# Patient Record
Sex: FEMALE | Race: WHITE | HISPANIC OR LATINO
[De-identification: names, ages, dates, MRNs, and addresses within clinical notes are randomized per-mention and may not be internally consistent; named-entity substitution may affect disease eponyms.]

---

## 2017-01-23 ENCOUNTER — RX RENEWAL (OUTPATIENT)
Age: 62
End: 2017-01-23

## 2017-07-11 ENCOUNTER — APPOINTMENT (OUTPATIENT)
Dept: ORTHOPEDIC SURGERY | Facility: CLINIC | Age: 62
End: 2017-07-11

## 2017-07-21 ENCOUNTER — RX RENEWAL (OUTPATIENT)
Age: 62
End: 2017-07-21

## 2017-12-19 ENCOUNTER — APPOINTMENT (OUTPATIENT)
Dept: ORTHOPEDIC SURGERY | Facility: CLINIC | Age: 62
End: 2017-12-19
Payer: COMMERCIAL

## 2017-12-19 DIAGNOSIS — M25.552 PAIN IN LEFT HIP: ICD-10-CM

## 2017-12-19 DIAGNOSIS — M70.50 OTHER BURSITIS OF KNEE, UNSPECIFIED KNEE: ICD-10-CM

## 2017-12-19 PROCEDURE — 72170 X-RAY EXAM OF PELVIS: CPT

## 2017-12-19 PROCEDURE — 20610 DRAIN/INJ JOINT/BURSA W/O US: CPT | Mod: LT

## 2017-12-19 PROCEDURE — 99213 OFFICE O/P EST LOW 20 MIN: CPT | Mod: 25

## 2017-12-19 PROCEDURE — 73564 X-RAY EXAM KNEE 4 OR MORE: CPT | Mod: RT

## 2018-01-17 ENCOUNTER — MEDICATION RENEWAL (OUTPATIENT)
Age: 63
End: 2018-01-17

## 2018-05-14 ENCOUNTER — RX RENEWAL (OUTPATIENT)
Age: 63
End: 2018-05-14

## 2018-06-27 ENCOUNTER — APPOINTMENT (OUTPATIENT)
Dept: ORTHOPEDIC SURGERY | Facility: CLINIC | Age: 63
End: 2018-06-27
Payer: MEDICARE

## 2018-06-27 PROCEDURE — 73522 X-RAY EXAM HIPS BI 3-4 VIEWS: CPT

## 2018-06-27 PROCEDURE — 99214 OFFICE O/P EST MOD 30 MIN: CPT

## 2018-06-27 PROCEDURE — 73562 X-RAY EXAM OF KNEE 3: CPT | Mod: 50

## 2018-08-06 ENCOUNTER — APPOINTMENT (OUTPATIENT)
Dept: ORTHOPEDIC SURGERY | Facility: CLINIC | Age: 63
End: 2018-08-06
Payer: MEDICARE

## 2018-08-06 VITALS
HEART RATE: 75 BPM | DIASTOLIC BLOOD PRESSURE: 70 MMHG | HEIGHT: 61 IN | BODY MASS INDEX: 37.38 KG/M2 | SYSTOLIC BLOOD PRESSURE: 128 MMHG | WEIGHT: 198 LBS

## 2018-08-06 DIAGNOSIS — M75.32 CALCIFIC TENDINITIS OF LEFT SHOULDER: ICD-10-CM

## 2018-08-06 PROCEDURE — 20610 DRAIN/INJ JOINT/BURSA W/O US: CPT | Mod: RT

## 2018-08-06 PROCEDURE — 99213 OFFICE O/P EST LOW 20 MIN: CPT | Mod: 25

## 2018-08-06 PROCEDURE — 73030 X-RAY EXAM OF SHOULDER: CPT | Mod: 50

## 2018-08-22 ENCOUNTER — RX RENEWAL (OUTPATIENT)
Age: 63
End: 2018-08-22

## 2018-11-19 ENCOUNTER — APPOINTMENT (OUTPATIENT)
Dept: ORTHOPEDIC SURGERY | Facility: CLINIC | Age: 63
End: 2018-11-19
Payer: MEDICARE

## 2018-11-19 VITALS
BODY MASS INDEX: 35.87 KG/M2 | HEART RATE: 89 BPM | HEIGHT: 61 IN | WEIGHT: 190 LBS | SYSTOLIC BLOOD PRESSURE: 149 MMHG | DIASTOLIC BLOOD PRESSURE: 74 MMHG

## 2018-11-19 PROCEDURE — 99214 OFFICE O/P EST MOD 30 MIN: CPT | Mod: 25

## 2018-11-19 PROCEDURE — 20610 DRAIN/INJ JOINT/BURSA W/O US: CPT | Mod: RT

## 2019-01-02 ENCOUNTER — RX RENEWAL (OUTPATIENT)
Age: 64
End: 2019-01-02

## 2019-03-05 ENCOUNTER — APPOINTMENT (OUTPATIENT)
Dept: ORTHOPEDIC SURGERY | Facility: CLINIC | Age: 64
End: 2019-03-05
Payer: MEDICARE

## 2019-03-05 DIAGNOSIS — Z96.651 PRESENCE OF RIGHT ARTIFICIAL KNEE JOINT: ICD-10-CM

## 2019-03-05 DIAGNOSIS — M17.12 UNILATERAL PRIMARY OSTEOARTHRITIS, LEFT KNEE: ICD-10-CM

## 2019-03-05 PROCEDURE — 20610 DRAIN/INJ JOINT/BURSA W/O US: CPT | Mod: LT

## 2019-03-05 PROCEDURE — 99213 OFFICE O/P EST LOW 20 MIN: CPT | Mod: 25

## 2019-03-05 NOTE — DISCUSSION/SUMMARY
[de-identified] : She tolerated a local injection very well she will return in 3 months that she is having problem with her knee or 1 year for her total joint replacement.

## 2019-03-05 NOTE — PROCEDURE
[de-identified] : Procedure Note:\par \par Anatomic Location:  Left Knee\par \par Diagnosis:  Arthritis\par \par Procedure:  Injection of 2cc  of Marcaine 0.25% plain and Celestone 1cc, 6mg\par \par Local Spray: Ethyl Chloride.\par \par \par Patient has consented for the procedure.\par \par Injection  through a lateral parapatella approach.\par \par Patient tolerated the procedure well.\par \par Patient instructed to call the office if any reaction, fever, chills, increased erythema or swelling.   [phone].

## 2019-03-05 NOTE — PHYSICAL EXAM
[FreeTextEntry2] : She is doing very well with her hips.  She has really had no change of motion or function both hips are feeling well at this time.\par \par At this time her total knee replacement on the right is also doing well with full extension to 120 to.  Her left knee has arthritis and goes from full extension to 120 degrees of flexion today good medial lateral and anterior posterior stability but she is getting discomfort from her arthritis and gets significant improvement with local injections of Celestone.\par

## 2019-03-05 NOTE — HISTORY OF PRESENT ILLNESS
[All Hx] : past medical, family, and social [All] : medication and allergy [All Other ROS Normal] : All other review of systems are negative except as noted [FreeTextEntry1] : left knee pain [FreeTextEntry2] : Pt is a 62 y/o female  s/p R TKR 6/20/16, R THR 19 years ago, L THR 12 years ago and L TKR 13 years ago which are doing well.  She c/o left knee pain.  She states that she fell on the left knee twice in the last 6 weeks.  She has pain whenever she flexes the knee.  She does not have pain when she walks.  The pain is improving but her physical therapist advised that she be seen.  She takes Mobic and Tylenol for pain.

## 2019-03-20 ENCOUNTER — RX RENEWAL (OUTPATIENT)
Age: 64
End: 2019-03-20

## 2019-04-29 ENCOUNTER — RX RENEWAL (OUTPATIENT)
Age: 64
End: 2019-04-29

## 2019-04-30 ENCOUNTER — RX RENEWAL (OUTPATIENT)
Age: 64
End: 2019-04-30

## 2019-06-03 ENCOUNTER — RX RENEWAL (OUTPATIENT)
Age: 64
End: 2019-06-03

## 2019-06-10 ENCOUNTER — RX RENEWAL (OUTPATIENT)
Age: 64
End: 2019-06-10

## 2020-03-16 ENCOUNTER — RX RENEWAL (OUTPATIENT)
Age: 65
End: 2020-03-16

## 2020-08-19 ENCOUNTER — APPOINTMENT (OUTPATIENT)
Dept: ORTHOPEDIC SURGERY | Facility: CLINIC | Age: 65
End: 2020-08-19
Payer: MEDICARE

## 2020-08-19 VITALS — TEMPERATURE: 97.3 F

## 2020-08-19 PROCEDURE — 73562 X-RAY EXAM OF KNEE 3: CPT | Mod: 50

## 2020-08-19 PROCEDURE — 99213 OFFICE O/P EST LOW 20 MIN: CPT

## 2020-08-19 PROCEDURE — 73522 X-RAY EXAM HIPS BI 3-4 VIEWS: CPT

## 2020-08-19 RX ORDER — MELOXICAM 15 MG/1
15 TABLET ORAL
Qty: 90 | Refills: 1 | Status: ACTIVE | COMMUNITY
Start: 2020-08-19 | End: 1900-01-01

## 2020-09-04 ENCOUNTER — TRANSCRIPTION ENCOUNTER (OUTPATIENT)
Age: 65
End: 2020-09-04

## 2020-09-16 ENCOUNTER — TRANSCRIPTION ENCOUNTER (OUTPATIENT)
Age: 65
End: 2020-09-16

## 2020-12-01 ENCOUNTER — APPOINTMENT (OUTPATIENT)
Dept: ORTHOPEDIC SURGERY | Facility: CLINIC | Age: 65
End: 2020-12-01
Payer: MEDICARE

## 2020-12-01 VITALS — TEMPERATURE: 96.8 F

## 2020-12-01 DIAGNOSIS — M75.41 IMPINGEMENT SYNDROME OF RIGHT SHOULDER: ICD-10-CM

## 2020-12-01 PROCEDURE — 99214 OFFICE O/P EST MOD 30 MIN: CPT | Mod: 25

## 2020-12-01 PROCEDURE — 20610 DRAIN/INJ JOINT/BURSA W/O US: CPT | Mod: RT

## 2020-12-02 ENCOUNTER — APPOINTMENT (OUTPATIENT)
Dept: ORTHOPEDIC SURGERY | Facility: CLINIC | Age: 65
End: 2020-12-02
Payer: MEDICARE

## 2020-12-02 ENCOUNTER — APPOINTMENT (OUTPATIENT)
Dept: ORTHOPEDIC SURGERY | Facility: CLINIC | Age: 65
End: 2020-12-02

## 2020-12-02 VITALS
DIASTOLIC BLOOD PRESSURE: 70 MMHG | HEIGHT: 61 IN | HEART RATE: 82 BPM | BODY MASS INDEX: 41.54 KG/M2 | WEIGHT: 220 LBS | SYSTOLIC BLOOD PRESSURE: 158 MMHG

## 2020-12-02 VITALS — TEMPERATURE: 96.8 F

## 2020-12-02 DIAGNOSIS — M70.72 OTHER BURSITIS OF HIP, LEFT HIP: ICD-10-CM

## 2020-12-02 PROCEDURE — 20610 DRAIN/INJ JOINT/BURSA W/O US: CPT | Mod: LT

## 2020-12-02 PROCEDURE — 99213 OFFICE O/P EST LOW 20 MIN: CPT | Mod: 95

## 2020-12-02 NOTE — PROCEDURE
[de-identified] : This injection was given by CARL Gant\par \par Procedure Note: \par \par Anatomic Location: left  hip trochanteric bursitis\par \par Diagnosis:  hip trochanteric bursitis\par \par Procedure:  Injection of 6ccs of Marcaine 0.5% plain and Depo-Medrol 1cc (40 MG)\par \par Local Spray: Ethyl Chloride.\par \par Skin preparation with alcohol.\par \par Patient has consented for the procedure.\par \par Injection 22-gauge spinal needle  through a direct lateral approach.\par \par Patient tolerated the procedure well.\par \par Patient instructed to call the office if any reaction, fever, chills, increased erythema or swelling.   [phone].

## 2020-12-02 NOTE — HISTORY OF PRESENT ILLNESS
[Other Location: e.g. School (Enter Location, City,State)___] : at [unfilled], at the time of the visit. [Other Location: e.g. Home (Enter Location, City,State)___] : at [unfilled] [Verbal consent obtained from patient] : the patient, [unfilled] [de-identified] : Pt is a 64 y/o female s/p R TKR 4 years ago, R THR 20 years ago, L THR 13 years ago and L TKR 14 years ago which are doing well. \par She presents with c/o left hip pain x 2 weeks.\par Her left hip pain is on the lateral aspect and hurts with activities such as getting up from a seated position and with prolonged ambulating.\par It also hurts when sleeping on the affected side at night.\par She reports some tightness in the left thigh. She takes Mobic and Tylenol for pain as needed with relief. No numbness or tingling.

## 2020-12-02 NOTE — DISCUSSION/SUMMARY
[de-identified] : She tolerated the local injection well and her left greater trochanteric bursa.  Return visit in 6 months to a year or sooner if necessary.

## 2020-12-02 NOTE — PROCEDURE
[de-identified] : Procedure Note: \par \par Anatomic Location: left hip trochanteric bursitis\par \par Diagnosis: hip trochanteric bursitis\par \par Procedure: Injection of 6ccs of Marcaine 0.5% plain and Depo-Medrol 1cc (40 MG)\par \par Local Spray: Ethyl Chloride.\par \par Skin preparation with alcohol.\par \par Patient has consented for the procedure.\par \par Injection 22-gauge spinal needle through a direct lateral approach.\par \par Patient tolerated the procedure well.\par \par Patient instructed to call the office if any reaction, fever, chills, increased erythema or swelling. [phone].

## 2020-12-02 NOTE — PHYSICAL EXAM
[FreeTextEntry2] : Her hips continue to do well she has not had any major change of motion she has somewhat less motion as before in her right hip than her left but her pain is over her left hip is not the groin and is not buttock but her pain is directly over her left greater trochanter is consistent with trochanteric bursitis.\par \par Her right total knee replacement is not changed and is doing well.  She does have left knee arthritis but at this time is managing satisfactorily with it.  \par \par

## 2020-12-03 NOTE — PROCEDURE
[de-identified] : Injection: Right shoulder (Subacromial).\par Indication: Rotator cuff tear.\par \par A discussion was had with the patient regarding this procedure and all questions were answered. All risks, benefits and alternatives were discussed. These included but were not limited to bleeding, infection, and allergic reaction. Alcohol was used to clean the skin, and betadine was used to sterilize and prep the area in the posterior aspect of the right shoulder. Ethyl chloride spray was then used as a topical anesthetic. A 21-gauge needle was used to inject 4cc of 1% lidocaine and 1cc of 40mg/ml methylprednisolone into the right subacromial space. A sterile bandage was then applied. The patient tolerated the procedure well and there were no complications.

## 2020-12-03 NOTE — DISCUSSION/SUMMARY
[de-identified] : 64 y/o female with right shoulder rotator cuff tear\par \par Patient continues to have symptoms of the right shoulder that are exacerbated with overuse.  Ultrasound has shown a large underlying rotator cuff injury with likely long head biceps tendon rupture.  Unable to undergo any surgical intervention at this time given her need to look after her aging mother.  Discussed utility of cortisone in attempts to control acute discomfort, but long-term consequences to tendon function and possible decreased outcomes with future rotator cuff repair.\par \par Recommendations: Continue conservative management.  Activity modifications.  Symptomatic pain relief\par \par Follow-up as needed.

## 2020-12-03 NOTE — REASON FOR VISIT
[Follow-Up Visit] : a follow-up visit for [Shoulder Pain] : shoulder pain [FreeTextEntry2] : Right shoulder pain

## 2020-12-03 NOTE — HISTORY OF PRESENT ILLNESS
[de-identified] : 65 year old female seen previously for chronic bilateral shoulder pain presents for follow up of right shoulder. States that her pain has gotten worse in the past month. US right shoulder dated 12/5/18 shows evidence of large RTC tear supraspinatus, tendinopathy and PT tear IS. Likely LHB tendon rupture. She received cortisone injection 2018 which providing her significant relief until recently. Currently attending PT 2 x's a week which is helpful. She is here for repeat injection as she is unable to undergo any surgical intervention as she continues to look after her aging mother. Rates her pain 7/10 and takes Motrin/ Tylenol.

## 2020-12-03 NOTE — ADDENDUM
[FreeTextEntry1] : This note was written by Corrina John on 12/01/2020 acting solely as a scribe for Dr. Shan Topete.\par \par All medical record entries made by the Scribe were at my, Dr. Shan Topete, direction and personally dictated by me on 12/01/2020. I have personally reviewed the chart and agree that the record accurately reflects my personal performance of the history, physical exam, assessment and plan.

## 2020-12-03 NOTE — PHYSICAL EXAM
[de-identified] : Oriented to time, place, person\par Mood: Normal\par Affect: Normal\par Appearance: Healthy, well appearing, no acute distress.\par Gait: Normal\par Assistive Devices: None \par \par Right shoulder exam:\par \par Inspection: No malalignment, No defects, No atrophy\par Skin: No masses, No lesions\par Neck: Negative Spurling, full ROM, no pain with ROM\par AROM: FF to 180, abduction to 90, ER to 70, IR to mid lumbar\par Painful arc ROM: none\par Tenderness: No bicipital tenderness, no tenderness to greater tuberosity/RTC insertion, no anterior shoulder/lesser tuberosity tenderness\par Strength: 4/5 ER, 5/5 IR in adduction, 4+/5 supraspinatus testing, negative [place]'s test\par AC joint: No TTP/pain with cross arm testing\par Biceps: Speed Negative, Yergason Negative \par Impingement test: Negative [place], Negative Neer\par Vasc: 2+ radial pulse \par Stability: Stable \par Neuro: AIN, PIN, Ulnar nerve intact to motor\par Sensation: Intact to light touch throughout  [de-identified] : 3 views of the right shoulder were obtained 8.6.18 that show no acute fracture or dislocation. There is mild glenohumeral and mild AC joint degenerative change seen. Type II acromion. There is no significant malalignment. No significant other obvious osseous abnormality, otherwise unremarkable.\par \par US right shoulder dated 12/5/18 shows evidence of large RTC tear supraspinatus, tendinopathy and PT tear IS. Likely LHB tendon rupture.

## 2021-01-06 ENCOUNTER — APPOINTMENT (OUTPATIENT)
Dept: ORTHOPEDIC SURGERY | Facility: CLINIC | Age: 66
End: 2021-01-06
Payer: MEDICARE

## 2021-01-06 VITALS
WEIGHT: 220 LBS | SYSTOLIC BLOOD PRESSURE: 133 MMHG | HEIGHT: 61 IN | DIASTOLIC BLOOD PRESSURE: 70 MMHG | HEART RATE: 85 BPM | BODY MASS INDEX: 41.54 KG/M2

## 2021-01-06 VITALS — TEMPERATURE: 97.2 F

## 2021-01-06 DIAGNOSIS — M70.62 TROCHANTERIC BURSITIS, LEFT HIP: ICD-10-CM

## 2021-01-06 DIAGNOSIS — Z96.642 PRESENCE OF LEFT ARTIFICIAL HIP JOINT: ICD-10-CM

## 2021-01-06 PROCEDURE — 99213 OFFICE O/P EST LOW 20 MIN: CPT | Mod: 95

## 2021-01-06 PROCEDURE — 73522 X-RAY EXAM HIPS BI 3-4 VIEWS: CPT

## 2021-01-06 PROCEDURE — 72100 X-RAY EXAM L-S SPINE 2/3 VWS: CPT

## 2021-01-06 NOTE — DISCUSSION/SUMMARY
[de-identified] : She did try the diclofenac but felt she did better on the meloxicam so she has changed back to meloxicam twice a day.  She is being referred to spine orthopedics for their evaluation.  At this time I suggest we follow conservative measures for her hips.

## 2021-01-06 NOTE — HISTORY OF PRESENT ILLNESS
[Other Location: e.g. School (Enter Location, City,State)___] : at [unfilled], at the time of the visit. [Other Location: e.g. Home (Enter Location, City,State)___] : at [unfilled] [Verbal consent obtained from patient] : the patient, [unfilled] [de-identified] : Pt is a 64 y/o female s/p R TKR 4 years ago, R THR 20 years ago, L TKR 14 years ago, and L THR 13 years ago which are doing well. \par She presents with c/o left hip pain x 2 months.\par Her left hip pain is on the posterolateral aspect and hurts with activities such as getting up from a seated position and with prolonged ambulating.\par It also hurts when sleeping on the affected side at night too.\par She notes the pain intermittently radiates from the left side of her lower back down to her knee as well.\par As of last office visit on 12/2/20, she was prescribed Diclofenac, which hasn't helped, so she reverted back to taking the Mobic. She also takes Tylenol, with no added relief.\par Patient also had received a left hip GT bursa injection on 12/2/20, which only helped the pain mildly.\par Lastly, an MRI of the left hip was done on 12/22/20, showing no osteolysis, fracture, or pseudotumor of the left total hip, and no obvious iliopsoas tendinitis/bursitis.

## 2021-01-06 NOTE — PHYSICAL EXAM
[FreeTextEntry2] : At this time the patient appears to be having some pain radiating toward her right and left buttocks actually more from her low back.  She can stand on her toes and her heels.  Her motor and sensory function in the lower legs is symmetric.  She does have some discomfort in her low back but she also has had a history of some left greater trochanteric bursitis which improved after her last injection however she has some discomfort in this area also.  Some of the pain that she is feeling however may be due to referred pain from her lumbar spine.  \par \par Her hips continue to do satisfactorily her motion is the same today as she is able to flex and each of her hips in the past 95 degrees and she can abduct easily to 55 degrees.  The motion in her hip with rotation and AB and abduction does not give her groin pain.  Her discomfort appears to be more toward her buttocks and some pain over the trochanteric bursa's but possibly more from a radicular pain from her back. \par \par Her right total knee replacement is not changed and is doing well.  She does have left knee arthritis but at this time is managing satisfactorily with it.  \par \par  [de-identified] : An AP of the pelvis and a lateral of the right and left hip were done.  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  It is unchanged from previous x-rays there is some polyethylene wear because the femoral head is no longer in the center of the liner.  This is been for some time and her function is still good and she has good polyethylene left between the head and the shell.  Her left hip does show the Biomet hybrid total hip replacement and there is no loosening or osteolysis seen an MRI did not reveal any other remarkable findings.\par \par An AP and lateral of the lumbar spine show a right thoracic left lumbar scoliosis and significant degenerative changes at all 3 4 L4-5 and L5-S1.

## 2021-01-08 ENCOUNTER — APPOINTMENT (OUTPATIENT)
Dept: ORTHOPEDIC SURGERY | Facility: CLINIC | Age: 66
End: 2021-01-08
Payer: MEDICARE

## 2021-01-08 VITALS
BODY MASS INDEX: 41.54 KG/M2 | HEART RATE: 92 BPM | DIASTOLIC BLOOD PRESSURE: 75 MMHG | WEIGHT: 220 LBS | SYSTOLIC BLOOD PRESSURE: 164 MMHG | HEIGHT: 61 IN

## 2021-01-08 VITALS — TEMPERATURE: 97.1 F

## 2021-01-08 DIAGNOSIS — M25.50 PAIN IN UNSPECIFIED JOINT: ICD-10-CM

## 2021-01-08 PROCEDURE — 72083 X-RAY EXAM ENTIRE SPI 4/5 VW: CPT

## 2021-01-08 PROCEDURE — 72110 X-RAY EXAM L-2 SPINE 4/>VWS: CPT | Mod: 59

## 2021-01-08 PROCEDURE — 99214 OFFICE O/P EST MOD 30 MIN: CPT

## 2021-01-08 NOTE — ASSESSMENT
[FreeTextEntry1] : This is a 65-year-old female here today for evaluation of her low back and buttock pain.  She does also periodically get thigh pain.  Her symptoms are concerning for lumbar neurogenic claudication in the setting of multiple areas of spondylolisthesis in her lumbar spine.  As result I would like to proceed with a lumbar MRI.  An order for this is been placed today.  I have also prescribed her a new course of physical therapy.  I will see her back in 2 to 3 weeks for repeat clinical evaluation.  I encouraged her to reach out to me at any point if she has any new or worsening symptoms.

## 2021-01-08 NOTE — PHYSICAL EXAM
[de-identified] : Lumbar Physical Exam\par \par Gait -slightly antalgic\par \par Station -she is slightly forward pitched but when asked she states that she feels in in a comfortable position\par \par Sagittal balance -slightly positive\par \par Compensatory mechanism? -Bent hips\par \par Heel walk -unable to do\par \par Toe walk -unable to do\par \par Reflexes\par Patellar -normal\par Gastroc -normal\par Clonus -no\par \par Hip Exam -limited, patient has a history of bilateral total hip replacements\par \par Straight leg raise -negative\par \par Pulses -normal\par \par Range of motion -globally reduced\par \par Sensation \par Sensation intact to light touch in L1, L2, L3, L4, L5 and S1 dermatomes bilaterally\par \par Motor\par 	IP	Quad	HS	TA	Gastroc	EHL\par Right	4/5	5/5	5/5	5/5	5/5	5/5\par Left	4/5	5/5	5/5	5/5	5/5	5/5\par \par  [de-identified] : Lumbar radiographs\par L4-L5 spondylolisthesis\par L5-S1 spondylolisthesis\par Lumbar lordosis maintained\par \par Scoliosis radiographs\par 61 degrees of lumbar lordosis\par Pelvic incidence is approximately 58 degrees\par SVA within normal limits\par

## 2021-01-08 NOTE — HISTORY OF PRESENT ILLNESS
[de-identified] : This is a 65-year-old female with history of low back pain and bilateral buttock pain.  She states that this pain has been getting more severe and has been debilitating for her.  At this point she can not walk more than half a block before she has to stop.  She does feel better when she leans forward on a shopping cart.  Of note the patient has a history of 2 total hips and 2 total knees.

## 2021-01-28 ENCOUNTER — APPOINTMENT (OUTPATIENT)
Dept: ORTHOPEDIC SURGERY | Facility: CLINIC | Age: 66
End: 2021-01-28
Payer: MEDICARE

## 2021-01-28 VITALS — TEMPERATURE: 95.8 F

## 2021-01-28 PROCEDURE — 20610 DRAIN/INJ JOINT/BURSA W/O US: CPT | Mod: LT

## 2021-01-28 PROCEDURE — 73030 X-RAY EXAM OF SHOULDER: CPT | Mod: LT

## 2021-01-28 PROCEDURE — 99214 OFFICE O/P EST MOD 30 MIN: CPT | Mod: 25

## 2021-01-28 NOTE — HISTORY OF PRESENT ILLNESS
[de-identified] : 65 year old female seen previous for right shoulder RTC tear & left shoulder OA/calcific tendinitis presents today with left shoulder pain since December 2020. No trauma to the left shoulder. The pain has been getting worse in the past 2 weeks, its brought on with reaching overhead. Mobic and Tylenol is helpful. She had success with cortisone injection in the right shoulder would like to try on the left side today.  Patient has concerns regarding all of her musculoskeletal complaints and degenerative joint pains.

## 2021-01-28 NOTE — PHYSICAL EXAM
[de-identified] : Oriented to time, place, person\par Mood: Normal\par Affect: Normal\par Appearance: Healthy, well appearing, no acute distress.\par Gait: Normal\par Assistive Devices: None\par \par Left shoulder exam:\par \par Inspection: No malalignment, No defects, No atrophy\par Skin: No masses, No lesions\par Neck: Negative Spurling, full ROM, no pain with ROM\par PASSIVE ROM: FF to 180, abduction to 90, ER to 60, IR to mid thoracic\par Painful arc ROM: FULL\par Tenderness: No bicipital tenderness, + tenderness to greater tuberosity/RTC insertion, + anterior shoulder/lesser tuberosity tenderness\par Strength: 5/5 ER, 4+/5 IR in adduction, 3/5 supraspinatus testing, + [place]'s test\par AC joint: No TTP/pain with cross arm testing\par Biceps: Speed Negative, Yergason Negative \par Impingement test: + [place], + Neer\par Vasc: 2+ radial pulse \par Stability: Stable \par Neuro: AIN, PIN, Ulnar nerve intact to motor\par Sensation: Intact to light touch throughout  [de-identified] : The following radiographs were ordered and read by me during this patients visit. I reviewed each radiograph in detail with the patient and discussed the findings as highlighted below.\par \par 3 views of left shoulder were obtained today, 01/28/2021, that show no acute fracture or dislocation. There is moderate glenohumeral and mild AC joint degenerative change seen. Type II acromion. There is no significant malalignment. No significant other obvious osseous abnormality, otherwise unremarkable.

## 2021-01-28 NOTE — PROCEDURE
[de-identified] : Injection: Left glenohumeral joint.\par Indication: Osteoarthritis. \par \par A discussion was had with the patient regarding this procedure and all questions were answered. All risks, benefits and alternatives were discussed. These included but were not limited to bleeding, infection, and allergic reaction. Alcohol was used to clean the skin, and betadine was used to sterilize and prep the area in the posterior aspect of the left shoulder. Ethyl chloride spray was then used as a topical anesthetic. A 21-gauge needle was used to inject 4cc of 1% lidocaine and 1cc of 40mg/ml methylprednisolone into the left glenohumeral joint. A sterile bandage was then applied. The patient tolerated the procedure well and there were no complications.

## 2021-01-28 NOTE — ADDENDUM
[FreeTextEntry1] : This note was written by Corrina John on 01/28/2021 acting solely as a scribe for Dr. Shan Topete.\par \par All medical record entries made by the Scribe were at my, Dr. Shan Topete, direction and personally dictated by me on 01/28/2021. I have personally reviewed the chart and agree that the record accurately reflects my personal performance of the history, physical exam, assessment and plan.

## 2021-01-28 NOTE — DISCUSSION/SUMMARY
[de-identified] : 64 y/o female with left shoulder OA \par \par X-rays suggest that there is progressive osteoarthritic change within the left shoulder.  Patient had prior calcific tendinopathy, but that since has resolved.  I discussed the treatment of degenerative shoulder arthritis with the patient at length today.  Patient also has known rotator cuff dysfunction on the contralateral side that was not addressed today.  I described the spectrum of treatment from nonoperative modalities to total joint arthroplasty. Noninvasive and nonoperative treatment modalities include activity modification with avoidance of overhead activity/excessive glenohumeral rotation, PRN use of acetaminophen or anti-inflammatory medication if tolerated, and physical therapy. Additional treatment can include intermittent corticosteroid injection for acute pain relief. Possible use of visco-supplementation also.\par \par Definitive treatment of total joint arthroplasty would be considered for failure of conservative management, progressive pain, and ultimate inability to perform ADL's. Discussed consideration between TSA and revTSA depending on the quality and condition of the rotator cuff tendon. Short-term and long-term outcomes were discussed as well as the potential need for revision arthroplasty. \par \par Injection therapy was provided for therapeutic and symptomatic relief. \par \par The risks and benefits of each treatment options was discussed and all questions were answered. \par \par At this time the recommendation is conservative care & observation, this includes rest/activity avoidance until less symptomatic with subsequent gradual return to full activity as tolerated. Patient may also use OTC NSAID's or acetaminophen as tolerated, with application of ice to the area 2-3x daily for 20 minutes after periods of activity. \par \par Follow up as needed if symptoms persist.

## 2021-01-29 ENCOUNTER — APPOINTMENT (OUTPATIENT)
Dept: ORTHOPEDIC SURGERY | Facility: CLINIC | Age: 66
End: 2021-01-29
Payer: MEDICARE

## 2021-01-29 VITALS — TEMPERATURE: 96.1 F

## 2021-01-29 PROCEDURE — ZZZZZ: CPT

## 2021-01-29 NOTE — ASSESSMENT
[FreeTextEntry1] : I had a lengthy discussion with the patient in regards to her treatment plan and diagnosis.  She has significant areas of lumbar spinal stenosis which are likely contributing to her lumbar radicular type symptoms.  The patient has no interest in surgery.  She states that she is very well willing to live with her current amount of disability.  I did offer her an epidural steroid injection.  I do think a translaminar epidural steroid injection at L4-L5 may be very beneficial.  She was in agreement with this.  An order for this is been placed today.  I encouraged her to continue with physical therapy.  I will see her back in 3 to 4 weeks for repeat clinical evaluation to ensure she is progressing in the right direction.  I encouraged her to follow-up sooner if her symptoms worsen or change in any way.

## 2021-01-29 NOTE — PHYSICAL EXAM
[de-identified] : Lumbar Physical Exam\par \par Gait normal today\par \par Station -she is slightly forward pitched but when asked she states that she feels in in a comfortable position.  She can correct herself to neutral\par \par Sagittal balance -slightly positive\par \par Compensatory mechanism? -Bent hips\par \par Heel walk -unable to do\par \par Toe walk -unable to do\par \par Reflexes\par Patellar -normal\par Gastroc -normal\par Clonus -no\par \par Hip Exam -limited, patient has a history of bilateral total hip replacements\par \par Straight leg raise -negative\par \par Pulses -normal\par \par Range of motion -globally reduced\par \par Sensation \par Sensation intact to light touch in L1, L2, L3, L4, L5 and S1 dermatomes bilaterally\par \par Motor\par 	IP	Quad	HS	TA	Gastroc	EHL\par Right	5/5	5/5	5/5	5/5	5/5	5/5\par Left	5/5	5/5	5/5	5/5	5/5	5/5\par \par Exam largely unchanged today [de-identified] : Lumbar MRI\par Multiple areas of lumbar spondylosis noted\par L4-L5 spondylolisthesis noted\par Most severe area of stenosis is at L4-L5

## 2021-01-29 NOTE — HISTORY OF PRESENT ILLNESS
[de-identified] : This is a 65-year-old female with history of low back pain and bilateral buttock pain.  She states that this pain has been getting more severe and has been debilitating for her.  At this point she can not walk more than half a block before she has to stop.  She does feel better when she leans forward on a shopping cart.  Of note the patient has a history of 2 total hips and 2 total knees.  Today her symptoms are largely unchanged.  This back pain and leg pain is debilitating for her and does interfere with her ability to perform her activities of daily living.

## 2021-02-20 ENCOUNTER — OUTPATIENT (OUTPATIENT)
Dept: OUTPATIENT SERVICES | Facility: HOSPITAL | Age: 66
LOS: 1 days | End: 2021-02-20
Payer: MEDICARE

## 2021-02-20 DIAGNOSIS — Z96.652 PRESENCE OF LEFT ARTIFICIAL KNEE JOINT: Chronic | ICD-10-CM

## 2021-02-20 DIAGNOSIS — Z20.828 CONTACT WITH AND (SUSPECTED) EXPOSURE TO OTHER VIRAL COMMUNICABLE DISEASES: ICD-10-CM

## 2021-02-20 DIAGNOSIS — Z96.643 PRESENCE OF ARTIFICIAL HIP JOINT, BILATERAL: Chronic | ICD-10-CM

## 2021-02-20 LAB — SARS-COV-2 RNA SPEC QL NAA+PROBE: SIGNIFICANT CHANGE UP

## 2021-02-20 PROCEDURE — U0003: CPT

## 2021-02-20 PROCEDURE — U0005: CPT

## 2021-02-22 ENCOUNTER — OUTPATIENT (OUTPATIENT)
Dept: OUTPATIENT SERVICES | Facility: HOSPITAL | Age: 66
LOS: 1 days | Discharge: HOME | End: 2021-02-22
Payer: MEDICARE

## 2021-02-22 DIAGNOSIS — Z96.643 PRESENCE OF ARTIFICIAL HIP JOINT, BILATERAL: Chronic | ICD-10-CM

## 2021-02-22 DIAGNOSIS — M54.16 RADICULOPATHY, LUMBAR REGION: ICD-10-CM

## 2021-02-22 DIAGNOSIS — Z96.652 PRESENCE OF LEFT ARTIFICIAL KNEE JOINT: Chronic | ICD-10-CM

## 2021-02-22 PROCEDURE — 62323 NJX INTERLAMINAR LMBR/SAC: CPT

## 2021-03-12 ENCOUNTER — APPOINTMENT (OUTPATIENT)
Dept: ORTHOPEDIC SURGERY | Facility: CLINIC | Age: 66
End: 2021-03-12
Payer: MEDICARE

## 2021-03-12 PROCEDURE — 99213 OFFICE O/P EST LOW 20 MIN: CPT

## 2021-03-13 ENCOUNTER — OUTPATIENT (OUTPATIENT)
Dept: OUTPATIENT SERVICES | Facility: HOSPITAL | Age: 66
LOS: 1 days | End: 2021-03-13
Payer: MEDICARE

## 2021-03-13 DIAGNOSIS — Z96.652 PRESENCE OF LEFT ARTIFICIAL KNEE JOINT: Chronic | ICD-10-CM

## 2021-03-13 DIAGNOSIS — Z20.828 CONTACT WITH AND (SUSPECTED) EXPOSURE TO OTHER VIRAL COMMUNICABLE DISEASES: ICD-10-CM

## 2021-03-13 DIAGNOSIS — Z96.643 PRESENCE OF ARTIFICIAL HIP JOINT, BILATERAL: Chronic | ICD-10-CM

## 2021-03-13 LAB — SARS-COV-2 RNA SPEC QL NAA+PROBE: SIGNIFICANT CHANGE UP

## 2021-03-13 PROCEDURE — U0003: CPT

## 2021-03-13 PROCEDURE — U0005: CPT

## 2021-03-14 NOTE — ASSESSMENT
[FreeTextEntry1] : This is a 65-year-old female with a history of back and bilateral buttock pain.  At this point she would like to do whatever can be done to avoid surgery.  I think this is absolutely reasonable.  She should continue getting epidural steroid injections with Dr. Farfan.  She should continue with physical therapy.  I will see her back in 6 to 8 weeks for repeat clinical evaluation.  I encouraged her to follow-up sooner if she has any new or worsening symptoms.

## 2021-03-14 NOTE — PHYSICAL EXAM
[de-identified] : Lumbar Physical Exam\par \par Gait normal today\par \par Station -she is slightly forward pitched but when asked she states that she feels in in a comfortable position.  She can correct herself to neutral\par \par Sagittal balance -slightly positive\par \par Compensatory mechanism? -Bent hips\par \par Heel walk -unable to do\par \par Toe walk -unable to do\par \par Reflexes\par Patellar -normal\par Gastroc -normal\par Clonus -no\par \par Hip Exam -limited, patient has a history of bilateral total hip replacements\par \par Straight leg raise -negative\par \par Pulses -normal\par \par Range of motion -globally reduced\par \par Sensation \par Sensation intact to light touch in L1, L2, L3, L4, L5 and S1 dermatomes bilaterally\par \par Motor\par 	IP	Quad	HS	TA	Gastroc	EHL\par Right	5/5	5/5	5/5	5/5	5/5	5/5\par Left	5/5	5/5	5/5	5/5	5/5	5/5\par \par Exam largely unchanged today 3/12 [de-identified] : Lumbar MRI\par Multiple areas of lumbar spondylosis noted\par L4-L5 spondylolisthesis noted\par Most severe area of stenosis is at L4-L5

## 2021-03-14 NOTE — HISTORY OF PRESENT ILLNESS
[de-identified] : This is a 65-year-old female with history of low back pain and bilateral buttock pain.  She states that this pain has been getting more severe and has been debilitating for her.  At this point she can not walk more than half a block before she has to stop.  She does feel better when she leans forward on a shopping cart.  Of note the patient has a history of 2 total hips and 2 total knees.  Today her symptoms are largely unchanged.  This back pain and leg pain is debilitating for her and does interfere with her ability to perform her activities of daily living.\par \par The above history was in from the patient's previous visit.  Her symptoms persist including back and bilateral leg pain.  She is still having symptoms concerning for neurogenic claudication.  Continues to deny any issues with bowel bladder function or saddle anesthesia.

## 2021-03-15 ENCOUNTER — OUTPATIENT (OUTPATIENT)
Dept: OUTPATIENT SERVICES | Facility: HOSPITAL | Age: 66
LOS: 1 days | End: 2021-03-15
Payer: MEDICARE

## 2021-03-15 DIAGNOSIS — Z96.652 PRESENCE OF LEFT ARTIFICIAL KNEE JOINT: Chronic | ICD-10-CM

## 2021-03-15 DIAGNOSIS — M54.16 RADICULOPATHY, LUMBAR REGION: ICD-10-CM

## 2021-03-15 DIAGNOSIS — Z96.643 PRESENCE OF ARTIFICIAL HIP JOINT, BILATERAL: Chronic | ICD-10-CM

## 2021-03-15 PROCEDURE — 62323 NJX INTERLAMINAR LMBR/SAC: CPT

## 2021-04-12 ENCOUNTER — NON-APPOINTMENT (OUTPATIENT)
Age: 66
End: 2021-04-12

## 2021-05-07 ENCOUNTER — APPOINTMENT (OUTPATIENT)
Dept: ORTHOPEDIC SURGERY | Facility: CLINIC | Age: 66
End: 2021-05-07
Payer: MEDICARE

## 2021-05-07 PROCEDURE — 99214 OFFICE O/P EST MOD 30 MIN: CPT

## 2021-05-07 NOTE — PHYSICAL EXAM
[de-identified] : Lumbar Physical Exam\par \par Gait normal today\par \par Station -she is slightly forward pitched but when asked she states that she feels in in a comfortable position.  She can correct herself to neutral\par \par Sagittal balance -slightly positive\par \par Compensatory mechanism? -Bent hips\par \par Heel walk -unable to do\par \par Toe walk -unable to do\par \par Reflexes\par Patellar -normal\par Gastroc -normal\par Clonus -no\par \par Hip Exam -limited, patient has a history of bilateral total hip replacements\par \par Straight leg raise -negative\par \par Pulses -normal\par \par Range of motion -globally reduced\par \par Sensation \par Sensation intact to light touch in L1, L2, L3, L4, L5 and S1 dermatomes bilaterally\par \par Motor\par 	IP	Quad	HS	TA	Gastroc	EHL\par Right	5/5	5/5	5/5	5/5	5/5	5/5\par Left	5/5	5/5	5/5	5/5	5/5	5/5\par \par Exam largely unchanged today 5/7 [de-identified] : Lumbar MRI\par Multiple areas of lumbar spondylosis noted\par L4-L5 spondylolisthesis noted\par Most severe area of stenosis is at L4-L5

## 2021-05-07 NOTE — ASSESSMENT
[FreeTextEntry1] : I had a long discussion with the patient regards to her treatment plan and diagnosis.  She certainly has symptoms concerning for lumbar neurogenic claudication in the setting of spondylolisthesis in her lumbar spine.  The patient does not want to pursue any sort of operative treatment at this point.  I encouraged her to continue to follow-up with her pain management specialist for her third and final epidural steroid injection.  She should continue with physical therapy or at least a home exercise program for her back and core muscles.  I will see her back in 3 months time.  I encouraged her to reach out to me sooner if she has any new or worsening symptoms.  Please note that over 30 minutes of time was spent in care of this patient which includes previsit preparation, in person visit, post visit documentation.

## 2021-05-07 NOTE — HISTORY OF PRESENT ILLNESS
[de-identified] : This is a 65-year-old female with history of low back pain and bilateral buttock pain.  She states that this pain has been getting more severe and has been debilitating for her.  At this point she can not walk more than half a block before she has to stop.  She does feel better when she leans forward on a shopping cart.  Of note the patient has a history of 2 total hips and 2 total knees.  Today her symptoms are largely unchanged.  This back pain and leg pain is debilitating for her and does interfere with her ability to perform her activities of daily living.\par \par The above history was in from the patient's previous visit.  \par \par Since our last visit the patient is still complaining of significant back pain and buttock pain.  The symptoms are worse after any sort of prolonged ambulation.  This can be debilitating for her.  Since our last visit the patient has gotten epidural steroid injections which have not dramatically improved her symptoms.  She has only received 2 of the 3 steroid injections which are currently scheduled for her.  Currently she denies any bowel bladder issues.  She denies any saddle anesthesia.  She denies any focal areas of weakness.  She denies any numbness or tingling.

## 2021-05-10 DIAGNOSIS — M51.26 OTHER INTERVERTEBRAL DISC DISPLACEMENT, LUMBAR REGION: ICD-10-CM

## 2021-06-14 ENCOUNTER — OUTPATIENT (OUTPATIENT)
Dept: OUTPATIENT SERVICES | Facility: HOSPITAL | Age: 66
LOS: 1 days | End: 2021-06-14
Payer: MEDICARE

## 2021-06-14 DIAGNOSIS — Z96.643 PRESENCE OF ARTIFICIAL HIP JOINT, BILATERAL: Chronic | ICD-10-CM

## 2021-06-14 DIAGNOSIS — Z96.652 PRESENCE OF LEFT ARTIFICIAL KNEE JOINT: Chronic | ICD-10-CM

## 2021-06-14 DIAGNOSIS — M54.12 RADICULOPATHY, CERVICAL REGION: ICD-10-CM

## 2021-06-14 PROCEDURE — 62323 NJX INTERLAMINAR LMBR/SAC: CPT

## 2021-07-22 ENCOUNTER — TRANSCRIPTION ENCOUNTER (OUTPATIENT)
Age: 66
End: 2021-07-22

## 2021-08-20 ENCOUNTER — APPOINTMENT (OUTPATIENT)
Dept: ORTHOPEDIC SURGERY | Facility: CLINIC | Age: 66
End: 2021-08-20

## 2021-09-09 DIAGNOSIS — M54.5 LOW BACK PAIN: ICD-10-CM

## 2021-12-07 ENCOUNTER — APPOINTMENT (OUTPATIENT)
Dept: ORTHOPEDIC SURGERY | Facility: CLINIC | Age: 66
End: 2021-12-07
Payer: MEDICARE

## 2021-12-07 VITALS — BODY MASS INDEX: 44.37 KG/M2 | WEIGHT: 235 LBS | HEIGHT: 61 IN

## 2021-12-07 DIAGNOSIS — M19.012 PRIMARY OSTEOARTHRITIS, LEFT SHOULDER: ICD-10-CM

## 2021-12-07 PROCEDURE — 99214 OFFICE O/P EST MOD 30 MIN: CPT | Mod: 25

## 2021-12-07 PROCEDURE — 20610 DRAIN/INJ JOINT/BURSA W/O US: CPT | Mod: LT

## 2021-12-07 NOTE — PHYSICAL EXAM
[de-identified] : Oriented to time, place, person\par Mood: Normal\par Affect: Normal\par Appearance: Healthy, well appearing, no acute distress.\par Gait: Normal\par Assistive Devices: None\par \par Left shoulder exam:\par \par Inspection: No malalignment, No defects, No atrophy\par Skin: No masses, No lesions\par Neck: Negative Spurling, full ROM, no pain with ROM\par PASSIVE ROM: FF to 180, abduction to 90, ER to 60, IR to mid thoracic\par Painful arc ROM: FULL\par Tenderness: No bicipital tenderness, + tenderness to greater tuberosity/RTC insertion, + anterior shoulder/lesser tuberosity tenderness\par Strength: 5/5 ER, 4+/5 IR in adduction, 3/5 supraspinatus testing, + [place]'s test\par AC joint: No TTP/pain with cross arm testing\par Biceps: Speed Negative, Yergason Negative \par Impingement test: + [place], + Neer\par Vasc: 2+ radial pulse \par Stability: Stable \par Neuro: AIN, PIN, Ulnar nerve intact to motor\par Sensation: Intact to light touch throughout  [de-identified] : The following radiographs were ordered and read by me during this patients visit. I reviewed each radiograph in detail with the patient and discussed the findings as highlighted below.\par \par 3 views of left shoulder were obtained today, 01/28/2021, that show no acute fracture or dislocation. There is moderate glenohumeral and mild AC joint degenerative change seen. Type II acromion. There is no significant malalignment. No significant other obvious osseous abnormality, otherwise unremarkable.

## 2021-12-07 NOTE — HISTORY OF PRESENT ILLNESS
[de-identified] : 65 year old female seen previous for right shoulder RTC tear & left shoulder OA/calcific tendinitis presents today with return of  left shoulder pain since September 2021. She was sen ofor OA in th shoulder injected with cortisone at the last visit which gave her significant relief until September.  The pain is constant worse with lifting. Attending PT 2 x per week.  Mobic and Tylenol is helpful. She had success with cortisone injection in the left  shoulder would like repeat injection.

## 2021-12-07 NOTE — DISCUSSION/SUMMARY
[de-identified] : 67 y/o female with left shoulder OA \par \par Continue to discuss degree of arthrosis within the left shoulder.  We again described the spectrum of treatment from nonoperative modalities to total joint arthroplasty. Noninvasive and nonoperative treatment modalities include activity modification with avoidance of overhead activity/excessive glenohumeral rotation, PRN use of acetaminophen or anti-inflammatory medication if tolerated, and physical therapy. Additional treatment can include intermittent corticosteroid injection for acute pain relief. Definitive treatment of total joint arthroplasty would be considered for failure of conservative management, progressive pain, and ultimate inability to perform ADL's. Discussed consideration between TSA and revTSA depending on the quality and condition of the rotator cuff tendon. Short-term and long-term outcomes were discussed as well as the potential need for revision arthroplasty. \par \par Injection therapy was provided for therapeutic and symptomatic relief. \par \par Recommendation: Continued PT, OTC NSAID's or acetaminophen as tolerated, with application of ice to the area 2-3x daily for 20 minutes after periods of activity. \par \par Follow up as needed if symptoms persist.

## 2021-12-07 NOTE — PROCEDURE
[de-identified] : Injection: Left glenohumeral joint.\par Indication: Osteoarthritis. \par \par A discussion was had with the patient regarding this procedure and all questions were answered. All risks, benefits and alternatives were discussed. These included but were not limited to bleeding, infection, and allergic reaction. Alcohol was used to clean the skin, and betadine was used to sterilize and prep the area in the posterior aspect of the left shoulder. Ethyl chloride spray was then used as a topical anesthetic. A 21-gauge needle was used to inject 4cc of 1% lidocaine and 1cc of 40mg/ml methylprednisolone into the left glenohumeral joint. A sterile bandage was then applied. The patient tolerated the procedure well and there were no complications.

## 2022-03-01 ENCOUNTER — APPOINTMENT (OUTPATIENT)
Dept: ORTHOPEDIC SURGERY | Facility: CLINIC | Age: 67
End: 2022-03-01
Payer: MEDICARE

## 2022-03-01 VITALS — BODY MASS INDEX: 44.37 KG/M2 | HEIGHT: 61 IN | WEIGHT: 235 LBS

## 2022-03-01 LAB
BASOPHILS # BLD AUTO: 0.03 K/UL
BASOPHILS NFR BLD AUTO: 0.4 %
CRP SERPL-MCNC: 5 MG/L
EOSINOPHIL # BLD AUTO: 0.14 K/UL
EOSINOPHIL NFR BLD AUTO: 1.8 %
ERYTHROCYTE [SEDIMENTATION RATE] IN BLOOD BY WESTERGREN METHOD: 21 MM/HR
HCT VFR BLD CALC: 44.5 %
HGB BLD-MCNC: 14.1 G/DL
IMM GRANULOCYTES NFR BLD AUTO: 0.4 %
LYMPHOCYTES # BLD AUTO: 1.46 K/UL
LYMPHOCYTES NFR BLD AUTO: 18.3 %
MAN DIFF?: NORMAL
MCHC RBC-ENTMCNC: 29.5 PG
MCHC RBC-ENTMCNC: 31.7 GM/DL
MCV RBC AUTO: 93.1 FL
MONOCYTES # BLD AUTO: 0.7 K/UL
MONOCYTES NFR BLD AUTO: 8.8 %
NEUTROPHILS # BLD AUTO: 5.6 K/UL
NEUTROPHILS NFR BLD AUTO: 70.3 %
PLATELET # BLD AUTO: 213 K/UL
RBC # BLD: 4.78 M/UL
RBC # FLD: 13.2 %
WBC # FLD AUTO: 7.96 K/UL

## 2022-03-01 PROCEDURE — 73522 X-RAY EXAM HIPS BI 3-4 VIEWS: CPT

## 2022-03-01 PROCEDURE — 99214 OFFICE O/P EST MOD 30 MIN: CPT

## 2022-03-01 PROCEDURE — 73562 X-RAY EXAM OF KNEE 3: CPT | Mod: 50

## 2022-03-01 NOTE — HISTORY OF PRESENT ILLNESS
[de-identified] : Pt is a 67 y/o female s/p R TKR 2017, R THR 2001, L TKR 2007, and L THR 2008 which are doing well. \par She presents with c/o left knee pain x 2 months.\par Her left knee pain is on the medial aspect and hurts with activities such as getting up from a seated position and with prolonged ambulating.\par She notes the pain intermittently radiates from the left side of her lower back down to her knee as well.

## 2022-03-01 NOTE — DISCUSSION/SUMMARY
[de-identified] : At this time the patient had drawn a CBC sed rate and C-reactive protein as well as a chromium cobalt and nickel.  She has had no fever or chills and no evidence clinically of infection.  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  This was thoroughly discussed with her as well as risk and benefits.  We will await her blood tests and see if she would proceed with polyexchange or revision of the left total knee replacement.  It is 16 years old.  She does not want to have an MRI of her study where she is in a ring because she says she gets severe claustrophobia.  Return visit in 2 to 3 weeks.

## 2022-03-01 NOTE — PHYSICAL EXAM
[FreeTextEntry2] : Patient since I saw her last is feeling better as far as her right hip and knee.  She has no symptoms on the right.  Some of the symptoms may be from her right back as seen in her history and chart.  The discomfort she comes in with today however is discomfort in her left knee.  She has been feeling some discomfort in her left knee for a few weeks but when it increased approximately 3 days ago without doing anything special or without falling. \par \par She has a bilateral Biomet total knee replacements.  Her left knee which is giving her the discomfort it was done approximately 16 years ago.  She feels slight swelling in the knee but just diffuse discomfort around the knee possibly more of the medial compartment but no major pain with compression of the patella.  The right knee is good medial lateral and posterior stability.  Her left knee has slight medial lateral jog but generally very good stability and good posterior stability.  The patella tracks well on both the right and the left knees.  Motion in both of her knees today shows her right knee going from 0 to 110 degrees her left knee goes from 0 to 95 degrees.\par \par Her hips continue to do satisfactorily  as she is able to flex and each of her hips in the past 95 degrees and she can abduct easily to 55 degrees.  He has no pain with flexion extension AB or adduction.\par \par  [de-identified] : An AP of the pelvis and a lateral of the right and left hip were done.  She has an S-ROM total hip replacement on the right which does show some thinning of the cartilage on the right however there is still polyethylene between the head and the liner.  This prosthesis is approximately 23 years old.  There is a porous prosthesis.  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  Her left hip shows a hybrid total hip replacement is in good position good position and well fixed there is only minimal wear of the polyethylene is still good space between the femoral head and the liner.  It appears to be well fixed and is cemented stem.  3 views were done of both the right and the left knees.  These show that the right knee shows the Biomet cemented total knee replacement which is a maximum Vanguard knee in good position and well fixed.  The polyethylene space appears to be maintained.  The patella tracks well.  There is no localized osteolysis.  Her left knee shows a very well fixed Biomet Vanguard her MAC maximum prosthesis also in excellent position with the patellar tracking well however the medial polyethylene it does appear to be markedly narrowed and is possibly it is fatigued or worn through. . \par \par

## 2022-03-04 LAB
COBALT: 2.6 UG/L
NICKEL: <2.5 UG/L

## 2022-03-15 LAB — CR BLD-MCNC: 3.3 UG/L

## 2022-03-22 ENCOUNTER — APPOINTMENT (OUTPATIENT)
Dept: ORTHOPEDIC SURGERY | Facility: CLINIC | Age: 67
End: 2022-03-22
Payer: MEDICARE

## 2022-03-22 VITALS — HEIGHT: 61 IN | WEIGHT: 235 LBS | BODY MASS INDEX: 44.37 KG/M2

## 2022-03-22 PROCEDURE — 99214 OFFICE O/P EST MOD 30 MIN: CPT

## 2022-03-22 NOTE — PHYSICAL EXAM
[FreeTextEntry2] : She is doing satisfactory with her hips at this time her discomfort stillness in her left knee she does have diffuse pain over the knee she has satisfactory medial lateral stability she may be in a trace of varus marrow she has good posterior stability.  She did show wear of her medial compartment polyethylene and her total knee replacement.  For that reason says she was sent for the metal testing which only shows of mild elevation of the acromium and of the acromion but not of the nickel.  I feel that this is a good sign that is only of polywear and that she is not down to metal against metal.  I would advise she do a revision surgery on her left knee and hopefully only do a polyexchange.  This was discussed with her.

## 2022-03-22 NOTE — HISTORY OF PRESENT ILLNESS
[de-identified] : Pt is a 67 y/o female s/p R TKR 2017, R THR 2001, L TKR 2007, and L THR 2008.\par She presents with c/o left knee pain x 3 months.\par Her left knee pain is on the medial aspect and hurts with activities such as getting up from a seated position and with prolonged ambulating.\par She notes the pain intermittently radiates from the left side of her lower back down to her knee as well. \par She was in office on 3/1/22 where she had blood drawn showing slightly elevated Cobalt and Chromium as well as ESR and CRP.

## 2022-03-22 NOTE — DISCUSSION/SUMMARY
[de-identified] : I feel that her symptoms are more from polywear.  Her blood counts have been received.  I feel that she would benefit from my apology and exchange and I think that her symptoms are mainly from polywear.  This was carefully discussed with her.  The decision making at this time would be to explore the left knee and hopefully do a polyexchange only.  If more is indicated at that time then more of a revision could be done.  Her white count is normal and does not need to joint is not warm and I do not feel there is clinically signs of infection.  I feel it is all polywear.

## 2022-08-23 ENCOUNTER — NON-APPOINTMENT (OUTPATIENT)
Age: 67
End: 2022-08-23

## 2022-08-23 ENCOUNTER — APPOINTMENT (OUTPATIENT)
Dept: INTERNAL MEDICINE | Facility: CLINIC | Age: 67
End: 2022-08-23

## 2022-08-23 VITALS
RESPIRATION RATE: 14 BRPM | DIASTOLIC BLOOD PRESSURE: 70 MMHG | OXYGEN SATURATION: 95 % | TEMPERATURE: 96.9 F | HEART RATE: 86 BPM | BODY MASS INDEX: 45.31 KG/M2 | SYSTOLIC BLOOD PRESSURE: 150 MMHG | WEIGHT: 240 LBS | HEIGHT: 61 IN

## 2022-08-23 VITALS — SYSTOLIC BLOOD PRESSURE: 138 MMHG | DIASTOLIC BLOOD PRESSURE: 70 MMHG

## 2022-08-23 PROCEDURE — 99204 OFFICE O/P NEW MOD 45 MIN: CPT

## 2022-08-23 RX ORDER — AMOXICILLIN 500 MG/1
500 TABLET, FILM COATED ORAL
Qty: 32 | Refills: 0 | Status: DISCONTINUED | COMMUNITY
Start: 2020-07-08 | End: 2022-08-23

## 2022-08-23 RX ORDER — FAMOTIDINE 20 MG/1
20 TABLET, FILM COATED ORAL
Refills: 0 | Status: ACTIVE | COMMUNITY

## 2022-08-23 RX ORDER — METHYLPREDNISOLONE 4 MG/1
4 TABLET ORAL
Qty: 21 | Refills: 0 | Status: DISCONTINUED | COMMUNITY
Start: 2020-05-28 | End: 2022-08-23

## 2022-08-23 RX ORDER — MELOXICAM 15 MG/1
15 TABLET ORAL
Qty: 60 | Refills: 1 | Status: DISCONTINUED | COMMUNITY
Start: 2018-01-17 | End: 2022-08-23

## 2022-08-23 RX ORDER — DICLOFENAC SODIUM 50 MG/1
50 TABLET, DELAYED RELEASE ORAL TWICE DAILY
Qty: 60 | Refills: 1 | Status: DISCONTINUED | COMMUNITY
Start: 2020-12-23 | End: 2022-08-23

## 2022-08-23 RX ORDER — FOLIC ACID 1 MG/1
1 TABLET ORAL
Qty: 30 | Refills: 0 | Status: DISCONTINUED | COMMUNITY
Start: 2020-02-03 | End: 2022-08-23

## 2022-08-23 RX ORDER — METHOTREXATE 2.5 MG/1
2.5 TABLET ORAL
Qty: 30 | Refills: 0 | Status: DISCONTINUED | COMMUNITY
Start: 2020-03-17 | End: 2022-08-23

## 2022-08-23 RX ORDER — TRAMADOL HYDROCHLORIDE 50 MG/1
50 TABLET, COATED ORAL
Qty: 90 | Refills: 0 | Status: DISCONTINUED | COMMUNITY
Start: 2017-12-14 | End: 2022-08-23

## 2022-08-23 RX ORDER — FAMOTIDINE 20 MG/1
20 TABLET, FILM COATED ORAL
Qty: 90 | Refills: 0 | Status: DISCONTINUED | COMMUNITY
Start: 2020-06-24 | End: 2022-08-23

## 2022-08-23 NOTE — ASSESSMENT
[FreeTextEntry1] : HTN- repeat better\par Hyperlipidemia- check lipid today\par check labs\par EKG next visit\par follow up with rheum and gi

## 2022-08-23 NOTE — HISTORY OF PRESENT ILLNESS
[de-identified] : History of OA- followed by rheum\par History of HTN- hyperlipidemia- stable on med\par History of GERD- followed by gi\par

## 2022-08-23 NOTE — HEALTH RISK ASSESSMENT
[Good] : ~his/her~  mood as  good [No falls in past year] : Patient reported no falls in the past year [0] : 2) Feeling down, depressed, or hopeless: Not at all (0) [Patient reported mammogram was normal] : Patient reported mammogram was normal [Patient reported colonoscopy was normal] : Patient reported colonoscopy was normal [MammogramDate] : 12/21 [ColonoscopyDate] : 5/19

## 2022-08-30 LAB
25(OH)D3 SERPL-MCNC: 35.9 NG/ML
ALBUMIN SERPL ELPH-MCNC: 4.7 G/DL
ALP BLD-CCNC: 62 U/L
ALT SERPL-CCNC: 24 U/L
ANION GAP SERPL CALC-SCNC: 12 MMOL/L
AST SERPL-CCNC: 22 U/L
BASOPHILS # BLD AUTO: 0.03 K/UL
BASOPHILS NFR BLD AUTO: 0.4 %
BILIRUB SERPL-MCNC: 0.8 MG/DL
BUN SERPL-MCNC: 20 MG/DL
CALCIUM SERPL-MCNC: 10.1 MG/DL
CHLORIDE SERPL-SCNC: 102 MMOL/L
CHOLEST SERPL-MCNC: 147 MG/DL
CO2 SERPL-SCNC: 27 MMOL/L
CREAT SERPL-MCNC: 0.74 MG/DL
EGFR: 88 ML/MIN/1.73M2
EOSINOPHIL # BLD AUTO: 0.08 K/UL
EOSINOPHIL NFR BLD AUTO: 1.2 %
ESTIMATED AVERAGE GLUCOSE: 120 MG/DL
FOLATE SERPL-MCNC: >20 NG/ML
GLUCOSE SERPL-MCNC: 121 MG/DL
HBA1C MFR BLD HPLC: 5.8 %
HCT VFR BLD CALC: 43.9 %
HDLC SERPL-MCNC: 36 MG/DL
HGB BLD-MCNC: 14.6 G/DL
IMM GRANULOCYTES NFR BLD AUTO: 0.3 %
LDLC SERPL CALC-MCNC: 77 MG/DL
LYMPHOCYTES # BLD AUTO: 1.93 K/UL
LYMPHOCYTES NFR BLD AUTO: 28.9 %
MAN DIFF?: NORMAL
MCHC RBC-ENTMCNC: 30.9 PG
MCHC RBC-ENTMCNC: 33.3 GM/DL
MCV RBC AUTO: 93 FL
MONOCYTES # BLD AUTO: 0.43 K/UL
MONOCYTES NFR BLD AUTO: 6.4 %
NEUTROPHILS # BLD AUTO: 4.18 K/UL
NEUTROPHILS NFR BLD AUTO: 62.8 %
NONHDLC SERPL-MCNC: 111 MG/DL
PLATELET # BLD AUTO: 212 K/UL
POTASSIUM SERPL-SCNC: 4.5 MMOL/L
PROT SERPL-MCNC: 7.2 G/DL
RBC # BLD: 4.72 M/UL
RBC # FLD: 13.2 %
SODIUM SERPL-SCNC: 142 MMOL/L
TRIGL SERPL-MCNC: 169 MG/DL
TSH SERPL-ACNC: 1.87 UIU/ML
VIT B12 SERPL-MCNC: 682 PG/ML
WBC # FLD AUTO: 6.67 K/UL

## 2022-11-22 ENCOUNTER — APPOINTMENT (OUTPATIENT)
Dept: INTERNAL MEDICINE | Facility: CLINIC | Age: 67
End: 2022-11-22

## 2022-12-01 ENCOUNTER — APPOINTMENT (OUTPATIENT)
Dept: INTERNAL MEDICINE | Facility: CLINIC | Age: 67
End: 2022-12-01

## 2022-12-01 VITALS
BODY MASS INDEX: 45.31 KG/M2 | HEIGHT: 61 IN | HEART RATE: 86 BPM | SYSTOLIC BLOOD PRESSURE: 142 MMHG | RESPIRATION RATE: 14 BRPM | DIASTOLIC BLOOD PRESSURE: 74 MMHG | OXYGEN SATURATION: 96 % | WEIGHT: 240 LBS

## 2022-12-01 PROCEDURE — 99214 OFFICE O/P EST MOD 30 MIN: CPT

## 2022-12-01 NOTE — ASSESSMENT
[FreeTextEntry1] : Grief- to mental health counselor for evaluation\par HTN- good control\par Hyperlipidemia- lipid under control.

## 2022-12-01 NOTE — HEALTH RISK ASSESSMENT
[No falls in past year] : Patient reported no falls in the past year [1] : 2) Feeling down, depressed, or hopeless for several days (1) [PHQ-2 Positive] : PHQ-2 Positive [PHQ-9 Positive] : PHQ-9 Positive

## 2022-12-01 NOTE — HISTORY OF PRESENT ILLNESS
[de-identified] : Very sad due to recent passing of mother. \par Pt with HTN- stable on meds\par Pt with hyperlipidemia- stable on meds

## 2022-12-08 ENCOUNTER — TRANSCRIPTION ENCOUNTER (OUTPATIENT)
Age: 67
End: 2022-12-08

## 2022-12-19 ENCOUNTER — TRANSCRIPTION ENCOUNTER (OUTPATIENT)
Age: 67
End: 2022-12-19

## 2022-12-22 RX ORDER — CLOTRIMAZOLE AND BETAMETHASONE DIPROPIONATE 10; .5 MG/G; MG/G
1-0.05 CREAM TOPICAL
Qty: 1 | Refills: 0 | Status: ACTIVE | COMMUNITY
Start: 2020-03-16 | End: 1900-01-01

## 2023-01-20 ENCOUNTER — RX RENEWAL (OUTPATIENT)
Age: 68
End: 2023-01-20

## 2023-03-27 ENCOUNTER — RX RENEWAL (OUTPATIENT)
Age: 68
End: 2023-03-27

## 2023-03-29 ENCOUNTER — APPOINTMENT (OUTPATIENT)
Dept: ORTHOPEDIC SURGERY | Facility: CLINIC | Age: 68
End: 2023-03-29
Payer: MEDICARE

## 2023-03-29 VITALS
SYSTOLIC BLOOD PRESSURE: 156 MMHG | BODY MASS INDEX: 45.31 KG/M2 | DIASTOLIC BLOOD PRESSURE: 77 MMHG | WEIGHT: 240 LBS | HEIGHT: 61 IN | HEART RATE: 86 BPM

## 2023-03-29 DIAGNOSIS — Z96.651 PRESENCE OF RIGHT ARTIFICIAL KNEE JOINT: ICD-10-CM

## 2023-03-29 PROCEDURE — 99213 OFFICE O/P EST LOW 20 MIN: CPT

## 2023-03-29 PROCEDURE — 73522 X-RAY EXAM HIPS BI 3-4 VIEWS: CPT

## 2023-03-29 PROCEDURE — 73562 X-RAY EXAM OF KNEE 3: CPT | Mod: LT

## 2023-03-29 NOTE — DISCUSSION/SUMMARY
[de-identified] : I feel that her symptoms are more from polywear.  Her blood counts have been received.  It is possible however that she does have metal-on-metal contact and if so a more ideal procedure rather than poly exchange would be revision of the knee replacement.  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  He is waiting to hear from them whether she should avoid all procedures or have a more limited procedure could have a larger procedure.  Depending on that knee and procedure for left knee would be if the metal is in good position condition especially over the femoral condyle then poly exchange can be considered or if there is where then it would be more beneficial to do revision of both the femoral and tibial components as well.  This was discussed with her.  She will contact us after she finds out more from her cardiologist.

## 2023-03-29 NOTE — HISTORY OF PRESENT ILLNESS
[de-identified] : Pt is a 68 y/o female s/p R TKR 2017, R THR 2001, L TKR 2007, and L THR 2008.\par She presents with c/o left knee pain x 1 year, worse over the last 2 months. Denies recent injury, trauma, or fall.\par Her left knee pain is on the medial aspect and hurts with activities such as getting up from a seated position and with prolonged ambulating.\par She notes the pain intermittently radiates from the left side of her lower back down to her knee as well. \par She was in office on 3/1/22 where she had blood drawn showing slightly elevated Cobalt and Chromium as well as ESR and CRP. \par It was discussed with her at her last visit on 3/22/22 that her pain and symptoms are due to polyethylene wear and that a polyethylene exchange should be done.

## 2023-03-29 NOTE — PHYSICAL EXAM
[de-identified] : 3 views of the right left knee show the right knee as before cemented Vanguard total knee replacement the polyethylene appears on the AP to be well-maintained the lateral view is excellent and the patella tracks well her opposite left knees does show the narrowing of the joint of the medial compartment of the knee indicating the polyethylene wear and it cannot be definitely be determined by this whether or not there is metal-on-metal contact the patella still tracks well and is in good position.\par \par An AP of the pelvis and lateral of the right left hip shows no change from previously she does have wearing a polyethylene of her right hip where she has an S-ROM total hip replacement with claw of the greater trochanter 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  She has now gained some weight and her BMI is 45.35.\par \par She is doing satisfactory with her hips at this time.  \par Her left knee does give her a diffuse pain around the knee and this knee she has been having have the wear of the polyethylene and this is significantly lost putting her knee in slight varus and she could have metal against metal contact.  \par She has been sent for metal testing which only showed a mild elevation of the chromium.  This could mean that it is only polyethylene wear.  Her motion in her knees has not changed from previously.  Her satisfactory medial lateral and posterior stability bilaterally.  Her right knee is doing well. \par \par

## 2023-04-14 NOTE — REVIEW OF SYSTEMS
Caller: patient  problem, Pressure in pelvic   Details of condition: Patient is calling to speak with a nurse regarding some pressure she has been having some strong pressure feeling in her pelvic region for about an hour now. Call patient and advise  Pharmacy verified? No  Appointment offered? No  Best time to reach patient: Any  Patient would like a call back at [place]   (enter if call back number is different from primary phone number)  Okay to leave a detailed voicemail? Yes       [Negative] : Heme/Lymph [FreeTextEntry9] : Back and leg pain

## 2023-04-28 ENCOUNTER — RX RENEWAL (OUTPATIENT)
Age: 68
End: 2023-04-28

## 2023-05-20 ENCOUNTER — NON-APPOINTMENT (OUTPATIENT)
Age: 68
End: 2023-05-20

## 2023-05-20 ENCOUNTER — APPOINTMENT (OUTPATIENT)
Dept: ORTHOPEDIC SURGERY | Facility: CLINIC | Age: 68
End: 2023-05-20
Payer: MEDICARE

## 2023-05-20 VITALS
HEART RATE: 78 BPM | SYSTOLIC BLOOD PRESSURE: 116 MMHG | DIASTOLIC BLOOD PRESSURE: 71 MMHG | BODY MASS INDEX: 45.31 KG/M2 | HEIGHT: 61 IN | WEIGHT: 240 LBS

## 2023-05-20 DIAGNOSIS — T84.013A BROKEN INTERNAL LEFT KNEE PROSTHESIS, INITIAL ENCOUNTER: ICD-10-CM

## 2023-05-20 PROCEDURE — 99203 OFFICE O/P NEW LOW 30 MIN: CPT

## 2023-05-20 PROCEDURE — 99213 OFFICE O/P EST LOW 20 MIN: CPT

## 2023-05-24 ENCOUNTER — APPOINTMENT (OUTPATIENT)
Dept: CT IMAGING | Facility: CLINIC | Age: 68
End: 2023-05-24
Payer: MEDICARE

## 2023-05-24 ENCOUNTER — RESULT REVIEW (OUTPATIENT)
Age: 68
End: 2023-05-24

## 2023-05-24 ENCOUNTER — OUTPATIENT (OUTPATIENT)
Dept: OUTPATIENT SERVICES | Facility: HOSPITAL | Age: 68
LOS: 1 days | End: 2023-05-24
Payer: MEDICARE

## 2023-05-24 DIAGNOSIS — Z96.643 PRESENCE OF ARTIFICIAL HIP JOINT, BILATERAL: Chronic | ICD-10-CM

## 2023-05-24 DIAGNOSIS — Z96.652 PRESENCE OF LEFT ARTIFICIAL KNEE JOINT: Chronic | ICD-10-CM

## 2023-05-24 DIAGNOSIS — T84.013A BROKEN INTERNAL LEFT KNEE PROSTHESIS, INITIAL ENCOUNTER: ICD-10-CM

## 2023-05-24 PROCEDURE — 73700 CT LOWER EXTREMITY W/O DYE: CPT

## 2023-05-24 PROCEDURE — 76376 3D RENDER W/INTRP POSTPROCES: CPT

## 2023-05-24 PROCEDURE — 76376 3D RENDER W/INTRP POSTPROCES: CPT | Mod: 26

## 2023-05-24 PROCEDURE — 73700 CT LOWER EXTREMITY W/O DYE: CPT | Mod: 26,LT,MH

## 2023-06-06 ENCOUNTER — APPOINTMENT (OUTPATIENT)
Dept: INTERNAL MEDICINE | Facility: CLINIC | Age: 68
End: 2023-06-06
Payer: MEDICARE

## 2023-06-06 VITALS
DIASTOLIC BLOOD PRESSURE: 62 MMHG | RESPIRATION RATE: 14 BRPM | WEIGHT: 240 LBS | TEMPERATURE: 98.2 F | HEIGHT: 61 IN | OXYGEN SATURATION: 95 % | SYSTOLIC BLOOD PRESSURE: 134 MMHG | HEART RATE: 72 BPM | BODY MASS INDEX: 45.31 KG/M2

## 2023-06-06 DIAGNOSIS — Z23 ENCOUNTER FOR IMMUNIZATION: ICD-10-CM

## 2023-06-06 DIAGNOSIS — F32.A DEPRESSION, UNSPECIFIED: ICD-10-CM

## 2023-06-06 PROCEDURE — G0009: CPT

## 2023-06-06 PROCEDURE — 90677 PCV20 VACCINE IM: CPT

## 2023-06-06 PROCEDURE — G0439: CPT

## 2023-06-06 RX ORDER — METOPROLOL SUCCINATE 25 MG/1
25 TABLET, EXTENDED RELEASE ORAL
Refills: 0 | Status: ACTIVE | COMMUNITY

## 2023-06-06 NOTE — ASSESSMENT
[FreeTextEntry1] : HTN- continue Valsartan / HCT 80/12.5\par hyperlipidemia- lipitor 20mg daily\par GERD- famotidine 20mg daily\par OA- Mobic 15mg daily\par

## 2023-06-06 NOTE — HISTORY OF PRESENT ILLNESS
[de-identified] : History of OA- followed by rheum\par History of HTN- and Hyperlipidemia- stable on meds\par History of GERD- follow by gi.\par Followed by cardiology ([facility])\par Anticipating left knee replacement

## 2023-06-06 NOTE — HEALTH RISK ASSESSMENT
[Good] : ~his/her~  mood as  good [No falls in past year] : Patient reported no falls in the past year [0] : 2) Feeling down, depressed, or hopeless: Not at all (0) [PHQ-2 Negative - No further assessment needed] : PHQ-2 Negative - No further assessment needed [Patient reported colonoscopy was abnormal] : Patient reported colonoscopy was abnormal [[id number]] : 0 [MammogramDate] : 05/23 [PapSmearDate] : 05/23 [ColonoscopyDate] : 05/19

## 2023-06-07 LAB
25(OH)D3 SERPL-MCNC: 26.1 NG/ML
ALBUMIN SERPL ELPH-MCNC: 4.6 G/DL
ALP BLD-CCNC: 67 U/L
ALT SERPL-CCNC: 21 U/L
ANION GAP SERPL CALC-SCNC: 16 MMOL/L
APPEARANCE: ABNORMAL
AST SERPL-CCNC: 25 U/L
BACTERIA: NEGATIVE /HPF
BILIRUB SERPL-MCNC: 1.1 MG/DL
BILIRUBIN URINE: NEGATIVE
BLOOD URINE: NEGATIVE
BUN SERPL-MCNC: 20 MG/DL
CALCIUM SERPL-MCNC: 9.8 MG/DL
CAST: 0 /LPF
CHLORIDE SERPL-SCNC: 100 MMOL/L
CHOLEST SERPL-MCNC: 156 MG/DL
CO2 SERPL-SCNC: 24 MMOL/L
COLOR: NORMAL
CREAT SERPL-MCNC: 0.64 MG/DL
EGFR: 95 ML/MIN/1.73M2
EPITHELIAL CELLS: 12 /HPF
ESTIMATED AVERAGE GLUCOSE: 126 MG/DL
FOLATE SERPL-MCNC: >20 NG/ML
GLUCOSE QUALITATIVE U: NEGATIVE MG/DL
GLUCOSE SERPL-MCNC: 120 MG/DL
HBA1C MFR BLD HPLC: 6 %
HDLC SERPL-MCNC: 39 MG/DL
KETONES URINE: NEGATIVE MG/DL
LDLC SERPL CALC-MCNC: 80 MG/DL
LEUKOCYTE ESTERASE URINE: NEGATIVE
MICROSCOPIC-UA: NORMAL
NITRITE URINE: NEGATIVE
NONHDLC SERPL-MCNC: 117 MG/DL
PH URINE: 6
POTASSIUM SERPL-SCNC: 3.9 MMOL/L
PROT SERPL-MCNC: 6.9 G/DL
PROTEIN URINE: NEGATIVE MG/DL
RED BLOOD CELLS URINE: 3 /HPF
REVIEW: NORMAL
SODIUM SERPL-SCNC: 139 MMOL/L
SPECIFIC GRAVITY URINE: 1.02
TRIGL SERPL-MCNC: 187 MG/DL
TSH SERPL-ACNC: 1.97 UIU/ML
UROBILINOGEN URINE: 0.2 MG/DL
VIT B12 SERPL-MCNC: 668 PG/ML
WHITE BLOOD CELLS URINE: 0 /HPF

## 2023-06-20 ENCOUNTER — APPOINTMENT (OUTPATIENT)
Dept: ORTHOPEDIC SURGERY | Facility: CLINIC | Age: 68
End: 2023-06-20
Payer: MEDICARE

## 2023-06-20 VITALS — DIASTOLIC BLOOD PRESSURE: 77 MMHG | HEART RATE: 80 BPM | SYSTOLIC BLOOD PRESSURE: 136 MMHG

## 2023-06-20 DIAGNOSIS — Z96.652 PRESENCE OF LEFT ARTIFICIAL KNEE JOINT: ICD-10-CM

## 2023-06-20 PROCEDURE — 99214 OFFICE O/P EST MOD 30 MIN: CPT

## 2023-06-20 NOTE — REASON FOR VISIT
[Follow-Up Visit] : a follow-up visit for [Knee Pain] : knee pain [FreeTextEntry2] : CT scan of left knee for  review today

## 2023-06-20 NOTE — DISCUSSION/SUMMARY
[Medication Risks Reviewed] : Medication risks reviewed [Surgical risks reviewed] : Surgical risks reviewed [de-identified] : Dr. Ibarra evaluated this patient and will be guiding this patient's entire orthopedic management plan. The patient was advised that based upon their clinical presentation and radiographic findings they meet inclusion criteria for benefitting from a left revision total knee arthroplasty as a treatment option for failure of total knee replacement.  Based over the CT scan results will attempt to retain hardware and exchange the bearing as well as resurfaced the patella.  We will have Donya persona revision system as a backup if needed.\par \par The spectrum of treatments for failure of total knee replacement were reviewed in detail. I reviewed in detail the goals, alternatives, risks and benefits of revision total knee arthroplasty. The patient agrees to this plan of care.\par \par The patient was advised of the possible complications which are inclusive of but not exclusive to VTE-related, infectious-related, anesthetic-related, surgically-related, medically-related, and implant-related.\par \par The patient was advised that they will require a medical preoperative risk evaluation by their PCP. Further medical subspecialty clearances such as cardiac may be indicated if felt needed by their PCP.\par \par The patient was advised she may call our office after careful consideration of their treatment options and further consultation with any other physician, to begin the process of preoperative planning for left revision total arthroplasty at a time of their choosing.  Her  recently had a stroke and she is the one responsible for driving at the current time and states that having the revision in the immediate future would not be available for.  Advised her that this is no rash and she can have the revision when it is convenient for her.  Advised her to call the office in order to schedule when she is ready.

## 2023-06-20 NOTE — PHYSICAL EXAM
[Antalgic] : antalgic [Cane] : ambulates with cane [LE] : Sensory: Intact in bilateral lower extremities [DP] : dorsalis pedis 2+ and symmetric bilaterally [Normal LLE] : Left Lower Extremity: No scars, rashes, lesions, ulcers, skin intact [Normal Touch] : sensation intact for touch [Normal] : no peripheral adenopathy appreciated [de-identified] : General/Constitutional: Well appearing, 67year old female in no apparent distress; height and weight as detailed below; vital signs as detailed below\par Neuro:\par Orientation/Mental Status: Awake, alert, oriented to time, place, & person.\par Sensation: intact to fine & deep touch bilat. Feet/toes; \par EHL/AT(Peroneal N.): Bilat: 5/5\par \par Vascular: DP: Bilat: 2+\par Cap refill 1-2 sec. all toes\par Calves non tender bilat; No Jaci's Sx Bilat.\par Lymph: No enlarged LN in the popliteal chain bilaterally.\par Skin(LE): No cellulitis, edema, and min. venous varicosities bilaterally \par MS:\par Gait: Antalgic gait to the left\par Function: There is / mod. difficulty mounting the exam table. \par \par Hips: Both hips have full ROM without stiffness or pain in hip flexion, extension, external rotation, and abduction. No klunk/instability noted bilaterally with ROM exam. No palpable tenderness in the trochanteric bursa bilat. Hip flexion/extension strength was 5/5 bilaterally.\par Left Knee: Well-healed midline incision without erythema, drainage, or evidence of cellulitis.\par Swelling: Minimal\par Effusion: Minimal\par Alignment: Neutral alignment\par Tenderness: Diffuse tenderness\par Incision: Midline well-healed\par Skin Temp: Warm to touch\par ROM: Flexion: [110 deg.; Extension: [0 deg,\par Laxity A-P: Negative anterior posterior drawer\par Laxity M-L: Less than 5 degree laxity varus valgus stresses in extension, 6 degree laxity with varus valgus stresses in flexion\par PF crepitus: 1+ ; with/some pain n\par Quadricep formation: Intact / d in Extension & Flexion:\par Quad/Ham St: 5/5 [de-identified] : CT results as follows:\par \par Status post left total knee arthroplasty with cystic changes along the medial aspect of the tibial plateau abutting the tibial component concerning for osteolysis. Questionable cystic changes along the anterior aspect of the lateral tibial plateau. The femoral component appears intact without evidence of osteolysis. 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 Small to moderate knee joint effusion.\par

## 2023-06-20 NOTE — HISTORY OF PRESENT ILLNESS
[Stable] : stable [5] : a current pain level of 5/10 [7] : an average pain level of 7/10 [4] : a minimum pain level of 4/10 [10] : a maximum pain level of 10/10 [Standing] : standing [Intermit.] : ~He/She~ states the symptoms seem to be intermittent [Walking] : worsened by walking [Knee Flexion] : worsened with knee flexion [Knee Extension] : worsened with knee extension [Acetaminophen] : relieved by acetaminophen [NSAIDs] : relieved by nonsteroidal anti-inflammatory drugs [Rest] : relieved by rest [de-identified] : 68-year-old female presents for follow-up and review of CAT scan results of the left knee.  She states since the last office visit her symptoms are remaining stable.  She denies any changes.  Pain level today is a 5 out of 10 can be worse with weightbearing as well as walking.  She is taking meloxicam and Tylenol as needed with some improvement.\par   Denies any fevers chills chest pain calf pain shortness of breath

## 2023-06-20 NOTE — END OF VISIT
[FreeTextEntry3] : I, Dr. Franklin Ibarra MD, personally performed the evaluation and management services for this established patient who presented today with a new problem/exacerbation of an existing condition. That E/M includes conducting the examination, assessing all new/exacerbated conditions, and establishing a new plan of care. Today, MY ACP was here to assist with recording the history in my evaluation and management services of this new problem/exacerbated condition to be followed going forward.\par

## 2023-06-23 ENCOUNTER — RX RENEWAL (OUTPATIENT)
Age: 68
End: 2023-06-23

## 2023-07-31 ENCOUNTER — RX RENEWAL (OUTPATIENT)
Age: 68
End: 2023-07-31

## 2023-08-23 ENCOUNTER — APPOINTMENT (OUTPATIENT)
Dept: ORTHOPEDIC SURGERY | Facility: CLINIC | Age: 68
End: 2023-08-23
Payer: MEDICARE

## 2023-08-23 VITALS
BODY MASS INDEX: 45.31 KG/M2 | SYSTOLIC BLOOD PRESSURE: 166 MMHG | HEART RATE: 97 BPM | TEMPERATURE: 97.6 F | HEIGHT: 61 IN | OXYGEN SATURATION: 97 % | WEIGHT: 240 LBS | DIASTOLIC BLOOD PRESSURE: 71 MMHG

## 2023-08-23 DIAGNOSIS — Z96.642 PRESENCE OF LEFT ARTIFICIAL HIP JOINT: ICD-10-CM

## 2023-08-23 DIAGNOSIS — Z96.641 PRESENCE OF RIGHT ARTIFICIAL HIP JOINT: ICD-10-CM

## 2023-08-23 PROCEDURE — 99213 OFFICE O/P EST LOW 20 MIN: CPT

## 2023-08-23 PROCEDURE — 73522 X-RAY EXAM HIPS BI 3-4 VIEWS: CPT

## 2023-09-19 ENCOUNTER — RX RENEWAL (OUTPATIENT)
Age: 68
End: 2023-09-19

## 2023-09-19 ENCOUNTER — APPOINTMENT (OUTPATIENT)
Dept: ORTHOPEDIC SURGERY | Facility: CLINIC | Age: 68
End: 2023-09-19
Payer: MEDICARE

## 2023-09-19 DIAGNOSIS — Z96.652 PRESENCE OF LEFT ARTIFICIAL KNEE JOINT: ICD-10-CM

## 2023-09-19 PROCEDURE — 73562 X-RAY EXAM OF KNEE 3: CPT | Mod: 50

## 2023-09-19 PROCEDURE — 99213 OFFICE O/P EST LOW 20 MIN: CPT

## 2023-09-19 PROCEDURE — 73521 X-RAY EXAM HIPS BI 2 VIEWS: CPT

## 2023-10-02 ENCOUNTER — APPOINTMENT (OUTPATIENT)
Dept: ORTHOPEDIC SURGERY | Facility: CLINIC | Age: 68
End: 2023-10-02
Payer: MEDICARE

## 2023-10-02 PROCEDURE — 73030 X-RAY EXAM OF SHOULDER: CPT | Mod: LT

## 2023-10-02 PROCEDURE — 99214 OFFICE O/P EST MOD 30 MIN: CPT | Mod: 25

## 2023-10-02 PROCEDURE — 20610 DRAIN/INJ JOINT/BURSA W/O US: CPT | Mod: LT

## 2023-10-24 ENCOUNTER — APPOINTMENT (OUTPATIENT)
Dept: ORTHOPEDIC SURGERY | Facility: CLINIC | Age: 68
End: 2023-10-24
Payer: MEDICARE

## 2023-10-24 VITALS
SYSTOLIC BLOOD PRESSURE: 155 MMHG | HEART RATE: 88 BPM | HEIGHT: 61 IN | WEIGHT: 240 LBS | DIASTOLIC BLOOD PRESSURE: 81 MMHG | BODY MASS INDEX: 45.31 KG/M2

## 2023-10-24 PROCEDURE — 99214 OFFICE O/P EST MOD 30 MIN: CPT

## 2023-10-31 ENCOUNTER — RX RENEWAL (OUTPATIENT)
Age: 68
End: 2023-10-31

## 2023-11-08 ENCOUNTER — OUTPATIENT (OUTPATIENT)
Dept: OUTPATIENT SERVICES | Facility: HOSPITAL | Age: 68
LOS: 1 days | End: 2023-11-08
Payer: MEDICARE

## 2023-11-08 ENCOUNTER — RESULT REVIEW (OUTPATIENT)
Age: 68
End: 2023-11-08

## 2023-11-08 ENCOUNTER — APPOINTMENT (OUTPATIENT)
Dept: CT IMAGING | Facility: CLINIC | Age: 68
End: 2023-11-08
Payer: MEDICARE

## 2023-11-08 DIAGNOSIS — Z96.641 PRESENCE OF RIGHT ARTIFICIAL HIP JOINT: ICD-10-CM

## 2023-11-08 DIAGNOSIS — Z96.652 PRESENCE OF LEFT ARTIFICIAL KNEE JOINT: Chronic | ICD-10-CM

## 2023-11-08 DIAGNOSIS — Z96.643 PRESENCE OF ARTIFICIAL HIP JOINT, BILATERAL: Chronic | ICD-10-CM

## 2023-11-08 PROCEDURE — 73700 CT LOWER EXTREMITY W/O DYE: CPT | Mod: 26,RT,MH

## 2023-11-08 PROCEDURE — 73700 CT LOWER EXTREMITY W/O DYE: CPT

## 2023-11-08 PROCEDURE — 76376 3D RENDER W/INTRP POSTPROCES: CPT | Mod: 26

## 2023-11-08 PROCEDURE — 76376 3D RENDER W/INTRP POSTPROCES: CPT

## 2023-12-05 ENCOUNTER — APPOINTMENT (OUTPATIENT)
Dept: INTERNAL MEDICINE | Facility: CLINIC | Age: 68
End: 2023-12-05
Payer: MEDICARE

## 2023-12-05 VITALS
HEART RATE: 88 BPM | TEMPERATURE: 98.1 F | OXYGEN SATURATION: 93 % | SYSTOLIC BLOOD PRESSURE: 140 MMHG | HEIGHT: 61 IN | DIASTOLIC BLOOD PRESSURE: 72 MMHG | WEIGHT: 240 LBS | RESPIRATION RATE: 16 BRPM | BODY MASS INDEX: 45.31 KG/M2

## 2023-12-05 PROCEDURE — 99214 OFFICE O/P EST MOD 30 MIN: CPT

## 2023-12-06 LAB
ALBUMIN SERPL ELPH-MCNC: 4.7 G/DL
ALP BLD-CCNC: 67 U/L
ALT SERPL-CCNC: 20 U/L
ANION GAP SERPL CALC-SCNC: 15 MMOL/L
AST SERPL-CCNC: 24 U/L
BILIRUB SERPL-MCNC: 1 MG/DL
BUN SERPL-MCNC: 21 MG/DL
CALCIUM SERPL-MCNC: 10 MG/DL
CHLORIDE SERPL-SCNC: 100 MMOL/L
CHOLEST SERPL-MCNC: 148 MG/DL
CO2 SERPL-SCNC: 22 MMOL/L
CREAT SERPL-MCNC: 0.67 MG/DL
EGFR: 94 ML/MIN/1.73M2
ESTIMATED AVERAGE GLUCOSE: 120 MG/DL
GLUCOSE SERPL-MCNC: 118 MG/DL
HBA1C MFR BLD HPLC: 5.8 %
HDLC SERPL-MCNC: 39 MG/DL
LDLC SERPL CALC-MCNC: 82 MG/DL
NONHDLC SERPL-MCNC: 109 MG/DL
POTASSIUM SERPL-SCNC: 4.1 MMOL/L
PROT SERPL-MCNC: 7.2 G/DL
SODIUM SERPL-SCNC: 137 MMOL/L
TRIGL SERPL-MCNC: 153 MG/DL

## 2023-12-12 DIAGNOSIS — F41.0 PANIC DISORDER [EPISODIC PAROXYSMAL ANXIETY]: ICD-10-CM

## 2023-12-12 RX ORDER — ALPRAZOLAM 0.5 MG/1
0.5 TABLET ORAL
Qty: 30 | Refills: 0 | Status: ACTIVE | COMMUNITY
Start: 2023-12-12 | End: 1900-01-01

## 2023-12-23 ENCOUNTER — RX RENEWAL (OUTPATIENT)
Age: 68
End: 2023-12-23

## 2023-12-26 ENCOUNTER — APPOINTMENT (OUTPATIENT)
Dept: ORTHOPEDIC SURGERY | Facility: CLINIC | Age: 68
End: 2023-12-26
Payer: MEDICARE

## 2023-12-26 ENCOUNTER — NON-APPOINTMENT (OUTPATIENT)
Age: 68
End: 2023-12-26

## 2023-12-26 VITALS — DIASTOLIC BLOOD PRESSURE: 76 MMHG | HEART RATE: 86 BPM | SYSTOLIC BLOOD PRESSURE: 131 MMHG

## 2023-12-26 DIAGNOSIS — T84.018D BROKEN INTERNAL JOINT PROSTHESIS, OTHER SITE, SUBSEQUENT ENCOUNTER: ICD-10-CM

## 2023-12-26 DIAGNOSIS — Z96.649 BROKEN INTERNAL JOINT PROSTHESIS, OTHER SITE, SUBSEQUENT ENCOUNTER: ICD-10-CM

## 2023-12-26 DIAGNOSIS — Z96.641 PRESENCE OF RIGHT ARTIFICIAL HIP JOINT: ICD-10-CM

## 2023-12-26 PROCEDURE — 99213 OFFICE O/P EST LOW 20 MIN: CPT

## 2023-12-26 NOTE — DISCUSSION/SUMMARY
[Medication Risks Reviewed] : Medication risks reviewed [Surgical risks reviewed] : Surgical risks reviewed [de-identified] : Dr. Ibarra evaluated this patient and is guiding this patients entire orthopedic management plan. The patient was advised that based upon their clinical presentation and radiographic findings they meet inclusion criteria for benefitting from a revision right] total hip arthroplasty as a treatment option for polyethylene wear and osteolysis acetabular component. The spectrum of treatments for failed right total hip replacement were reviewed in detail. Dr. Ibarra reviewed in detail the goals, alternatives, risks and benefits of a revision posterior right total hip replacement The patient was advised of the possible complications which are inclusive of but not exclusive to VTE-related, infectious-related, anesthetic-related, surgically-related, medically-related, dislocation-related, and implant-related. A non-cemented type hip implant is utilized after consideration of bony integrity intraoperatively.  The patient was educated regarding the surgical procedure steps, especially using an Hip implant and bone model, as well as steps in their hospital course and primary discharge planning for home discharge with home care and home PT. Choices for implant 's, mainly DJO and Biomet-Donya were reviewed, with selection to be made by surgeon intraoperatively. The patient was advised that limb length equality may not be assured secondary to variabilities in pelvic malrotation, stable intraoperative fit, opposite side wear, and other anatomic deformities of the lower extremity. The patient was advised of the goals, alternatives, risks and benefits of autologous, directed and banked blood product transfusions for perioperative blood losses. The patient was advised of the goals, alternatives, risks and benefits of a 3 week course of Celebrex 200 mg BID utilized by Dr. Ibarra in their practice to prevent Heterotopic Ossification seen in patients post total hip arthroplasty. If this is medically contraindicated the alternative would be a single dose (800cGy) radiation treatment to the hip implant site performed pre-operatively. A consultation with the Radiation Oncologist at [facility] will then be required. I reviewed the plan of care as well as a model of the Hip implant equivalent to the one that will be used for the THR. The patient agreed to the plan of care as well as the use of implants for their THR.  The patient was advised that they will require a medical preoperative risk evaluation by their PCP. Further medical subspecialty clearances such as cardiac may be indicated if felt needed by their PCP. The patient was advised he/she may call our office after careful consideration of their treatment options and further consultation with any other physician to begin the process of preoperative planning for elective revision right total hip replacement at a time of their choosing.  The patient does understand that we would replace the cup with a new 1 with a probable dual mobility implant.  We would evaluate his stem if it is stable and intact this will stay.  However, if there are any issues with the stem this may have to be replaced as well for a full revision.  She understands all of this.  She understand the multiple risks with revision surgery such as leg length discrepancy, neurological issue such as a foot drop, or weakness.  She is willing to accept this.  All of her questions were thoroughly answered to her satisfaction.  She will book revision right total hip replacement surgery at her earliest convenience.

## 2023-12-26 NOTE — PHYSICAL EXAM
[Antalgic] : antalgic [LE] : Sensory: Intact in bilateral lower extremities [ALL] : dorsalis pedis, posterior tibial, femoral, popliteal, and radial 2+ and symmetric bilaterally [Normal] : No costovertebral angle tenderness and no spinal tenderness [de-identified] : On physical examination of the right hip.  Patient is status post right total hip replacement from the posterior approach approximately 24 years ago.  There is a well-healed surgical scar with no evidence of infection superficial or deep.  There is no redness heat discharge or fever noted.  However there is some discomfort in her hip mostly into the groin.  This is worse with any active range of motion both active and passively.  The pain at each of these does radiate towards the groin.  No palpable click is noted.  There is no evidence of muscle atrophy.  No evidence of any motor or sensory deficit.  Patient has good distal pulses with no calf tenderness. [de-identified] : No x-rays were performed in today's office visit.  However x-rays performed recently through [facility] dated September 19, 2023.  Shows status post right total hip replacement.  The prosthesis is in place with good alignment and fixation.  However it is noted that there is a migration of the femoral head prosthesis.  Indicating poly wear.  It is also noted that the patient does have a claw with cerclage wires due to an old fracture of the greater troches.  This is well-healed and the hardware is in place.  CAT scan performed on 11/8/2023 shows superior positioning of femoral head and the acetabular cup suggestive of liner wear lucency deep to the acetabular cup concerning for osteolysis.

## 2023-12-26 NOTE — HISTORY OF PRESENT ILLNESS
[Stable] : stable [8] : a current pain level of 8/10 [6] : an average pain level of 6/10 [3] : a minimum pain level of 3/10 [9] : a maximum pain level of 9/10 [Constant] : ~He/She~ states the symptoms seem to be constant [Hip Movement] : worsened by hip movement [Lifting] : worsened by lifting [Running] : worsened by running [Walking] : worsened by walking [Acetaminophen] : relieved by acetaminophen [Recumbency] : relieved by recumbency [Rest] : relieved by rest [None] : No relieving factors are noted [de-identified] : Patient is a 68-year-old female who presents today for an re-evaluation of her right hip and to go over studies.  The patient has a history of a right total knee replacement done in 2017, a right total hip replacement done in 2001, a left total knee replacement done in 2007, and a left total hip replacement done in 2008.  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 Hensley.   She states that she did very well for many years but now has started to notice of this discomfort.  She states it occurs mostly in the right hip but slightly on her left.  She states that the pain is a 7 on a scale of 1-10.  She states that it is worse with any activities including standing walking or even going up and down stairs.  She often feels a click as well.  She did seek medical attention with another orthopedist who explained that she would need to have a revision due to the wear and tear of the hip replacement.  She denies any history of new or recent injury.  Presents today for an evaluation. [Ataxia] : no ataxia [Incontinence] : no incontinence [Loss of Dexterity] : good dexterity [Urinary Ret.] : no urinary retention

## 2024-01-29 ENCOUNTER — RX RENEWAL (OUTPATIENT)
Age: 69
End: 2024-01-29

## 2024-03-06 ENCOUNTER — OUTPATIENT (OUTPATIENT)
Dept: OUTPATIENT SERVICES | Facility: HOSPITAL | Age: 69
LOS: 1 days | End: 2024-03-06
Payer: MEDICARE

## 2024-03-06 VITALS
SYSTOLIC BLOOD PRESSURE: 173 MMHG | OXYGEN SATURATION: 98 % | WEIGHT: 246.92 LBS | DIASTOLIC BLOOD PRESSURE: 74 MMHG | RESPIRATION RATE: 14 BRPM | TEMPERATURE: 98 F | HEART RATE: 84 BPM | HEIGHT: 59.5 IN

## 2024-03-06 DIAGNOSIS — Z96.652 PRESENCE OF LEFT ARTIFICIAL KNEE JOINT: Chronic | ICD-10-CM

## 2024-03-06 DIAGNOSIS — Z90.89 ACQUIRED ABSENCE OF OTHER ORGANS: Chronic | ICD-10-CM

## 2024-03-06 DIAGNOSIS — Z96.651 PRESENCE OF RIGHT ARTIFICIAL KNEE JOINT: Chronic | ICD-10-CM

## 2024-03-06 DIAGNOSIS — Z96.643 PRESENCE OF ARTIFICIAL HIP JOINT, BILATERAL: Chronic | ICD-10-CM

## 2024-03-06 DIAGNOSIS — Z98.890 OTHER SPECIFIED POSTPROCEDURAL STATES: Chronic | ICD-10-CM

## 2024-03-06 DIAGNOSIS — M25.551 PAIN IN RIGHT HIP: ICD-10-CM

## 2024-03-06 DIAGNOSIS — Z01.818 ENCOUNTER FOR OTHER PREPROCEDURAL EXAMINATION: ICD-10-CM

## 2024-03-06 DIAGNOSIS — T84.018D BROKEN INTERNAL JOINT PROSTHESIS, OTHER SITE, SUBSEQUENT ENCOUNTER: ICD-10-CM

## 2024-03-06 DIAGNOSIS — Z98.891 HISTORY OF UTERINE SCAR FROM PREVIOUS SURGERY: Chronic | ICD-10-CM

## 2024-03-06 LAB
A1C WITH ESTIMATED AVERAGE GLUCOSE RESULT: 6.1 % — HIGH (ref 4–5.6)
ALBUMIN SERPL ELPH-MCNC: 4 G/DL — SIGNIFICANT CHANGE UP (ref 3.3–5)
ALP SERPL-CCNC: 63 U/L — SIGNIFICANT CHANGE UP (ref 30–120)
ALT FLD-CCNC: 23 U/L — SIGNIFICANT CHANGE UP (ref 10–60)
ANION GAP SERPL CALC-SCNC: 12 MMOL/L — SIGNIFICANT CHANGE UP (ref 5–17)
APTT BLD: 32.5 SEC — SIGNIFICANT CHANGE UP (ref 24.5–35.6)
AST SERPL-CCNC: 21 U/L — SIGNIFICANT CHANGE UP (ref 10–40)
BILIRUB SERPL-MCNC: 1 MG/DL — SIGNIFICANT CHANGE UP (ref 0.2–1.2)
BUN SERPL-MCNC: 19 MG/DL — SIGNIFICANT CHANGE UP (ref 7–23)
CALCIUM SERPL-MCNC: 9.6 MG/DL — SIGNIFICANT CHANGE UP (ref 8.4–10.5)
CHLORIDE SERPL-SCNC: 101 MMOL/L — SIGNIFICANT CHANGE UP (ref 96–108)
CO2 SERPL-SCNC: 25 MMOL/L — SIGNIFICANT CHANGE UP (ref 22–31)
CREAT SERPL-MCNC: 0.82 MG/DL — SIGNIFICANT CHANGE UP (ref 0.5–1.3)
EGFR: 77 ML/MIN/1.73M2 — SIGNIFICANT CHANGE UP
ESTIMATED AVERAGE GLUCOSE: 128 MG/DL — HIGH (ref 68–114)
GLUCOSE SERPL-MCNC: 111 MG/DL — HIGH (ref 70–99)
HCT VFR BLD CALC: 43 % — SIGNIFICANT CHANGE UP (ref 34.5–45)
HGB BLD-MCNC: 14.4 G/DL — SIGNIFICANT CHANGE UP (ref 11.5–15.5)
INR BLD: 0.98 RATIO — SIGNIFICANT CHANGE UP (ref 0.85–1.18)
MCHC RBC-ENTMCNC: 30 PG — SIGNIFICANT CHANGE UP (ref 27–34)
MCHC RBC-ENTMCNC: 33.5 GM/DL — SIGNIFICANT CHANGE UP (ref 32–36)
MCV RBC AUTO: 89.6 FL — SIGNIFICANT CHANGE UP (ref 80–100)
MRSA PCR RESULT.: SIGNIFICANT CHANGE UP
NRBC # BLD: 0 /100 WBCS — SIGNIFICANT CHANGE UP (ref 0–0)
PLATELET # BLD AUTO: 201 K/UL — SIGNIFICANT CHANGE UP (ref 150–400)
POTASSIUM SERPL-MCNC: 3.9 MMOL/L — SIGNIFICANT CHANGE UP (ref 3.5–5.3)
POTASSIUM SERPL-SCNC: 3.9 MMOL/L — SIGNIFICANT CHANGE UP (ref 3.5–5.3)
PROT SERPL-MCNC: 7.4 G/DL — SIGNIFICANT CHANGE UP (ref 6–8.3)
PROTHROM AB SERPL-ACNC: 11 SEC — SIGNIFICANT CHANGE UP (ref 9.5–13)
RBC # BLD: 4.8 M/UL — SIGNIFICANT CHANGE UP (ref 3.8–5.2)
RBC # FLD: 13 % — SIGNIFICANT CHANGE UP (ref 10.3–14.5)
S AUREUS DNA NOSE QL NAA+PROBE: SIGNIFICANT CHANGE UP
SODIUM SERPL-SCNC: 138 MMOL/L — SIGNIFICANT CHANGE UP (ref 135–145)
WBC # BLD: 6.95 K/UL — SIGNIFICANT CHANGE UP (ref 3.8–10.5)
WBC # FLD AUTO: 6.95 K/UL — SIGNIFICANT CHANGE UP (ref 3.8–10.5)

## 2024-03-06 PROCEDURE — 86077 PHYS BLOOD BANK SERV XMATCH: CPT

## 2024-03-06 PROCEDURE — 93010 ELECTROCARDIOGRAM REPORT: CPT

## 2024-03-06 PROCEDURE — G0463: CPT

## 2024-03-06 PROCEDURE — 93005 ELECTROCARDIOGRAM TRACING: CPT

## 2024-03-06 NOTE — H&P PST ADULT - PROBLEM SELECTOR PLAN 1
right revision total hip replacement, posterior approach, preop instructions given; to go for medical and cardiac clearance

## 2024-03-06 NOTE — H&P PST ADULT - NSICDXPASTMEDICALHX_GEN_ALL_CORE_FT
PAST MEDICAL HISTORY:  Arthritis     Chronic low back pain     GERD (gastroesophageal reflux disease)     HLD (hyperlipidemia)     Hypertension     Knee pain     Osteoarthritis     Pancreatic insufficiency

## 2024-03-06 NOTE — H&P PST ADULT - MS GEN HX ROS MEA POS PC
Saw that Patient portal was read yet, so I mailed out to patients address on file.    right hip/arthritis/joint pain/back pain

## 2024-03-06 NOTE — H&P PST ADULT - HISTORY OF PRESENT ILLNESS
this is a 67 y/o female who had right hip replacement about 20 yrs ago, has been having pain mostly front of right hip, worse when sitting; tests show that right hip implant has worn out; to have revision of right hip replacement

## 2024-03-06 NOTE — H&P PST ADULT - NSICDXPROCEDURE_GEN_ALL_CORE_FT
PROCEDURES:  Revision of right total hip arthroplasty by posterior approach 06-Mar-2024 10:25:54  Ana Grayson

## 2024-03-06 NOTE — H&P PST ADULT - NSICDXFAMILYHX_GEN_ALL_CORE_FT
FAMILY HISTORY:  Father  Still living? No  FH: heart disease, Age at diagnosis: Age Unknown  FH: type 2 diabetes, Age at diagnosis: Age Unknown    Mother  Still living? No  FH: hypertension, Age at diagnosis: Age Unknown

## 2024-03-06 NOTE — H&P PST ADULT - PROBLEM SELECTOR PROBLEM 1
Right hip pain Hydroxyzine Pregnancy And Lactation Text: This medication is not safe during pregnancy and should not be taken. It is also excreted in breast milk and breast feeding isn't recommended.

## 2024-03-06 NOTE — H&P PST ADULT - NSICDXPASTSURGICALHX_GEN_ALL_CORE_FT
PAST SURGICAL HISTORY:  S/p bilateral carpal tunnel release     S/P  section     S/P hip replacement, bilateral     S/P tonsillectomy     S/P total knee replacement, right     Status post total left knee replacement

## 2024-03-08 PROBLEM — M54.50 LOW BACK PAIN, UNSPECIFIED: Chronic | Status: ACTIVE | Noted: 2024-03-06

## 2024-03-11 ENCOUNTER — APPOINTMENT (OUTPATIENT)
Dept: ORTHOPEDIC SURGERY | Facility: CLINIC | Age: 69
End: 2024-03-11
Payer: MEDICARE

## 2024-03-11 DIAGNOSIS — M12.812 OTHER SPECIFIC ARTHROPATHIES, NOT ELSEWHERE CLASSIFIED, LEFT SHOULDER: ICD-10-CM

## 2024-03-11 PROCEDURE — 99213 OFFICE O/P EST LOW 20 MIN: CPT

## 2024-03-12 ENCOUNTER — APPOINTMENT (OUTPATIENT)
Dept: INTERNAL MEDICINE | Facility: CLINIC | Age: 69
End: 2024-03-12
Payer: MEDICARE

## 2024-03-12 VITALS
TEMPERATURE: 98.8 F | HEART RATE: 76 BPM | BODY MASS INDEX: 44.93 KG/M2 | WEIGHT: 238 LBS | DIASTOLIC BLOOD PRESSURE: 70 MMHG | OXYGEN SATURATION: 96 % | SYSTOLIC BLOOD PRESSURE: 152 MMHG | HEIGHT: 61 IN | RESPIRATION RATE: 14 BRPM

## 2024-03-12 VITALS — SYSTOLIC BLOOD PRESSURE: 132 MMHG | DIASTOLIC BLOOD PRESSURE: 82 MMHG

## 2024-03-12 DIAGNOSIS — Z01.818 ENCOUNTER FOR OTHER PREPROCEDURAL EXAMINATION: ICD-10-CM

## 2024-03-12 PROBLEM — M12.812 ROTATOR CUFF ARTHROPATHY OF LEFT SHOULDER: Status: ACTIVE | Noted: 2023-10-02

## 2024-03-12 PROCEDURE — 99214 OFFICE O/P EST MOD 30 MIN: CPT

## 2024-03-12 NOTE — ADDENDUM
[FreeTextEntry1] : This note was written by Corrina John on 03/11/2024 acting solely as a scribe for Dr. Shan Topete.  All medical record entries made by the Scribe were at my, Dr. Shan Topete, direction and personally dictated by me on 03/11/2024. I have personally reviewed the chart and agree that the record accurately reflects my personal performance of the history, physical exam, assessment and plan.

## 2024-03-12 NOTE — ASSESSMENT
[Patient Optimized for Surgery] : Patient optimized for surgery [FreeTextEntry4] : Pt to take blood pressure meds am of procedure with sips of water. Early ambulation.  Cleared by cards.  History or mild sleep apnea not on CPAP- please monitor.

## 2024-03-12 NOTE — DISCUSSION/SUMMARY
[de-identified] : 67 y/o female with left shoulder rotator cuff arthropathy  Patient was seen for further evaluation of her left shoulder RTC arthropathy. Concern for progressive symptoms, but patient presents with minimal pain and disability today. Also reports minimal improvement with prior injection as well as upcoming revision hip surgery. Discussed risks/benefits of injection therapy today and given clinical picture would recommend against injection therapy at this time. Could consider injection AFTER hip surgery at discretion of hip surgeon. Patient agreeable with plan.   Recommendation: Activity to berenice. Ice/NSAIDS as needed.   Follow up as needed.

## 2024-03-12 NOTE — PHYSICAL EXAM
[de-identified] : Left shoulder exam:  Inspection: No malalignment, No defects, No atrophy Skin: No masses, No lesions Neck: Negative Spurling, full ROM, no pain with ROM Active ROM: FF to 170, abduction to 90, ER to 60, IR to mid lumbar Painful arc ROM: None Tenderness: No bicipital tenderness, + tenderness to greater tuberosity/RTC insertion, + anterior shoulder/lesser tuberosity tenderness Strength: 5/5 ER, 5/5 IR in adduction, 4+/5 supraspinatus testing, + Grimesland's test AC joint: No TTP/pain with cross arm testing Biceps: Speed Negative, Yergason Negative  Impingement test: + Cheatham, + Neer Vasc: 2+ radial pulse  Stability: Stable  Neuro: AIN, PIN, Ulnar nerve intact to motor Sensation: Intact to light touch throughout  [de-identified] : 3 views of left shoulder were obtained 10/02/2023, that show no acute fracture or dislocation. There is severe glenohumeral and mild AC joint degenerative change seen. Type II acromion. There is high-riding humeral head. Progressed since 2021.

## 2024-03-12 NOTE — HISTORY OF PRESENT ILLNESS
[de-identified] : 68 year old RHD female presents today for follow up of left shoulder RTC arthropathy. She received steroid injection in October 2023 which gave her a few weeks of relief. Patient is scheduled for rev. right hip arthroplasty 3/25/24 and is here for possible injection of left shoulder to help get through rehab for her hip. Rates her pain 5/10. Takes Mobic and Tylenol with relief.

## 2024-03-12 NOTE — HISTORY OF PRESENT ILLNESS
[Sleep Apnea] : sleep apnea [No Adverse Anesthesia Reaction] : no adverse anesthesia reaction in self or family member [(Patient denies any chest pain, claudication, dyspnea on exertion, orthopnea, palpitations or syncope)] : Patient denies any chest pain, claudication, dyspnea on exertion, orthopnea, palpitations or syncope [Aortic Stenosis] : no aortic stenosis [Atrial Fibrillation] : no atrial fibrillation [Coronary Artery Disease] : no coronary artery disease [Asthma] : no asthma [Recent Myocardial Infarction] : no recent myocardial infarction [Smoker] : not a smoker [COPD] : no COPD [FreeTextEntry1] : Right Hip Revision  [FreeTextEntry2] : March 25, 2024 [FreeTextEntry4] : Followed by cardiology - cleared by cardiology. Dr. LAWSON History of mild sleep apnea [FreeTextEntry3] : Dr. Ibarra [FreeTextEntry7] : Nuclear Stress and Echo done 2023- reported Normal.

## 2024-03-18 ENCOUNTER — RX RENEWAL (OUTPATIENT)
Age: 69
End: 2024-03-18

## 2024-03-24 ENCOUNTER — TRANSCRIPTION ENCOUNTER (OUTPATIENT)
Age: 69
End: 2024-03-24

## 2024-03-25 ENCOUNTER — APPOINTMENT (OUTPATIENT)
Dept: ORTHOPEDIC SURGERY | Facility: HOSPITAL | Age: 69
End: 2024-03-25

## 2024-03-25 ENCOUNTER — INPATIENT (INPATIENT)
Facility: HOSPITAL | Age: 69
LOS: 2 days | Discharge: HOME | DRG: 950 | End: 2024-03-28
Attending: ORTHOPAEDIC SURGERY | Admitting: ORTHOPAEDIC SURGERY
Payer: MEDICARE

## 2024-03-25 ENCOUNTER — TRANSCRIPTION ENCOUNTER (OUTPATIENT)
Age: 69
End: 2024-03-25

## 2024-03-25 ENCOUNTER — RESULT REVIEW (OUTPATIENT)
Age: 69
End: 2024-03-25

## 2024-03-25 VITALS
HEIGHT: 60 IN | DIASTOLIC BLOOD PRESSURE: 61 MMHG | WEIGHT: 240.74 LBS | TEMPERATURE: 97 F | OXYGEN SATURATION: 100 % | HEART RATE: 75 BPM | SYSTOLIC BLOOD PRESSURE: 129 MMHG | RESPIRATION RATE: 14 BRPM

## 2024-03-25 DIAGNOSIS — Z96.651 PRESENCE OF RIGHT ARTIFICIAL KNEE JOINT: Chronic | ICD-10-CM

## 2024-03-25 DIAGNOSIS — T84.018D BROKEN INTERNAL JOINT PROSTHESIS, OTHER SITE, SUBSEQUENT ENCOUNTER: ICD-10-CM

## 2024-03-25 DIAGNOSIS — Z98.890 OTHER SPECIFIED POSTPROCEDURAL STATES: Chronic | ICD-10-CM

## 2024-03-25 DIAGNOSIS — Z96.643 PRESENCE OF ARTIFICIAL HIP JOINT, BILATERAL: Chronic | ICD-10-CM

## 2024-03-25 DIAGNOSIS — Z96.652 PRESENCE OF LEFT ARTIFICIAL KNEE JOINT: Chronic | ICD-10-CM

## 2024-03-25 DIAGNOSIS — Z90.89 ACQUIRED ABSENCE OF OTHER ORGANS: Chronic | ICD-10-CM

## 2024-03-25 DIAGNOSIS — Z98.891 HISTORY OF UTERINE SCAR FROM PREVIOUS SURGERY: Chronic | ICD-10-CM

## 2024-03-25 LAB
BLD GP AB SCN SERPL QL: SIGNIFICANT CHANGE UP
DIR ANTIGLOB POLYSPECIFIC INTERPRETATION: SIGNIFICANT CHANGE UP

## 2024-03-25 PROCEDURE — 88300 SURGICAL PATH GROSS: CPT | Mod: 26

## 2024-03-25 PROCEDURE — 73502 X-RAY EXAM HIP UNI 2-3 VIEWS: CPT | Mod: 26,RT

## 2024-03-25 PROCEDURE — 27134 REVISE HIP JOINT REPLACEMENT: CPT | Mod: AS,RT

## 2024-03-25 PROCEDURE — 99223 1ST HOSP IP/OBS HIGH 75: CPT

## 2024-03-25 PROCEDURE — 27134 REVISE HIP JOINT REPLACEMENT: CPT | Mod: RT

## 2024-03-25 DEVICE — BEARING HIP VIVACIT-E DM 28X44MM: Type: IMPLANTABLE DEVICE | Status: FUNCTIONAL

## 2024-03-25 DEVICE — IMPLANTABLE DEVICE: Type: IMPLANTABLE DEVICE | Status: FUNCTIONAL

## 2024-03-25 DEVICE — BONE FILLER BIOCOMPOSITE STIMULAN RAPID CURE 10CC: Type: IMPLANTABLE DEVICE | Status: FUNCTIONAL

## 2024-03-25 DEVICE — SCREW G7 6.5X15MM: Type: IMPLANTABLE DEVICE | Status: FUNCTIONAL

## 2024-03-25 RX ORDER — PANTOPRAZOLE SODIUM 20 MG/1
40 TABLET, DELAYED RELEASE ORAL
Refills: 0 | Status: DISCONTINUED | OUTPATIENT
Start: 2024-03-25 | End: 2024-03-28

## 2024-03-25 RX ORDER — ASPIRIN/CALCIUM CARB/MAGNESIUM 324 MG
81 TABLET ORAL EVERY 12 HOURS
Refills: 0 | Status: DISCONTINUED | OUTPATIENT
Start: 2024-03-26 | End: 2024-03-28

## 2024-03-25 RX ORDER — ALPRAZOLAM 0.25 MG
0.5 TABLET ORAL ONCE
Refills: 0 | Status: DISCONTINUED | OUTPATIENT
Start: 2024-03-25 | End: 2024-03-25

## 2024-03-25 RX ORDER — ONDANSETRON 8 MG/1
4 TABLET, FILM COATED ORAL EVERY 6 HOURS
Refills: 0 | Status: DISCONTINUED | OUTPATIENT
Start: 2024-03-25 | End: 2024-03-28

## 2024-03-25 RX ORDER — ATORVASTATIN CALCIUM 80 MG/1
20 TABLET, FILM COATED ORAL AT BEDTIME
Refills: 0 | Status: DISCONTINUED | OUTPATIENT
Start: 2024-03-25 | End: 2024-03-28

## 2024-03-25 RX ORDER — HYDROMORPHONE HYDROCHLORIDE 2 MG/ML
4 INJECTION INTRAMUSCULAR; INTRAVENOUS; SUBCUTANEOUS EVERY 4 HOURS
Refills: 0 | Status: DISCONTINUED | OUTPATIENT
Start: 2024-03-25 | End: 2024-03-28

## 2024-03-25 RX ORDER — METOPROLOL TARTRATE 50 MG
1 TABLET ORAL
Refills: 0 | DISCHARGE

## 2024-03-25 RX ORDER — OXYCODONE HYDROCHLORIDE 5 MG/1
10 TABLET ORAL
Refills: 0 | Status: DISCONTINUED | OUTPATIENT
Start: 2024-03-25 | End: 2024-03-25

## 2024-03-25 RX ORDER — ACETAMINOPHEN 500 MG
1000 TABLET ORAL EVERY 8 HOURS
Refills: 0 | Status: DISCONTINUED | OUTPATIENT
Start: 2024-03-26 | End: 2024-03-28

## 2024-03-25 RX ORDER — SODIUM CHLORIDE 9 MG/ML
1000 INJECTION, SOLUTION INTRAVENOUS
Refills: 0 | Status: DISCONTINUED | OUTPATIENT
Start: 2024-03-25 | End: 2024-03-25

## 2024-03-25 RX ORDER — ACETAMINOPHEN 500 MG
1000 TABLET ORAL ONCE
Refills: 0 | Status: COMPLETED | OUTPATIENT
Start: 2024-03-25 | End: 2024-03-25

## 2024-03-25 RX ORDER — SULFASALAZINE 500 MG
1000 TABLET ORAL
Refills: 0 | Status: DISCONTINUED | OUTPATIENT
Start: 2024-03-25 | End: 2024-03-28

## 2024-03-25 RX ORDER — OXYCODONE HYDROCHLORIDE 5 MG/1
5 TABLET ORAL
Refills: 0 | Status: DISCONTINUED | OUTPATIENT
Start: 2024-03-25 | End: 2024-03-25

## 2024-03-25 RX ORDER — HYDROMORPHONE HYDROCHLORIDE 2 MG/ML
0.2 INJECTION INTRAMUSCULAR; INTRAVENOUS; SUBCUTANEOUS
Refills: 0 | Status: DISCONTINUED | OUTPATIENT
Start: 2024-03-25 | End: 2024-03-25

## 2024-03-25 RX ORDER — FAMOTIDINE 10 MG/ML
20 INJECTION INTRAVENOUS AT BEDTIME
Refills: 0 | Status: DISCONTINUED | OUTPATIENT
Start: 2024-03-25 | End: 2024-03-28

## 2024-03-25 RX ORDER — HYDROMORPHONE HYDROCHLORIDE 2 MG/ML
0.5 INJECTION INTRAMUSCULAR; INTRAVENOUS; SUBCUTANEOUS
Refills: 0 | Status: DISCONTINUED | OUTPATIENT
Start: 2024-03-25 | End: 2024-03-25

## 2024-03-25 RX ORDER — ALPRAZOLAM 0.25 MG
0.5 TABLET ORAL DAILY
Refills: 0 | Status: DISCONTINUED | OUTPATIENT
Start: 2024-03-25 | End: 2024-03-28

## 2024-03-25 RX ORDER — SODIUM CHLORIDE 9 MG/ML
500 INJECTION INTRAMUSCULAR; INTRAVENOUS; SUBCUTANEOUS ONCE
Refills: 0 | Status: COMPLETED | OUTPATIENT
Start: 2024-03-25 | End: 2024-03-25

## 2024-03-25 RX ORDER — SENNA PLUS 8.6 MG/1
2 TABLET ORAL AT BEDTIME
Refills: 0 | Status: DISCONTINUED | OUTPATIENT
Start: 2024-03-25 | End: 2024-03-28

## 2024-03-25 RX ORDER — METOPROLOL TARTRATE 50 MG
25 TABLET ORAL DAILY
Refills: 0 | Status: DISCONTINUED | OUTPATIENT
Start: 2024-03-25 | End: 2024-03-28

## 2024-03-25 RX ORDER — CHLORHEXIDINE GLUCONATE 213 G/1000ML
1 SOLUTION TOPICAL ONCE
Refills: 0 | Status: COMPLETED | OUTPATIENT
Start: 2024-03-25 | End: 2024-03-25

## 2024-03-25 RX ORDER — DEXAMETHASONE 0.5 MG/5ML
8 ELIXIR ORAL ONCE
Refills: 0 | Status: COMPLETED | OUTPATIENT
Start: 2024-03-26 | End: 2024-03-26

## 2024-03-25 RX ORDER — CEFAZOLIN SODIUM 1 G
2000 VIAL (EA) INJECTION EVERY 8 HOURS
Refills: 0 | Status: COMPLETED | OUTPATIENT
Start: 2024-03-25 | End: 2024-03-26

## 2024-03-25 RX ORDER — APREPITANT 80 MG/1
40 CAPSULE ORAL ONCE
Refills: 0 | Status: COMPLETED | OUTPATIENT
Start: 2024-03-25 | End: 2024-03-25

## 2024-03-25 RX ORDER — ALPRAZOLAM 0.25 MG
1 TABLET ORAL
Refills: 0 | DISCHARGE

## 2024-03-25 RX ORDER — SODIUM CHLORIDE 9 MG/ML
1000 INJECTION, SOLUTION INTRAVENOUS
Refills: 0 | Status: DISCONTINUED | OUTPATIENT
Start: 2024-03-25 | End: 2024-03-28

## 2024-03-25 RX ORDER — TRANEXAMIC ACID 100 MG/ML
1000 INJECTION, SOLUTION INTRAVENOUS ONCE
Refills: 0 | Status: COMPLETED | OUTPATIENT
Start: 2024-03-25 | End: 2024-03-25

## 2024-03-25 RX ORDER — SULFASALAZINE 500 MG
2 TABLET ORAL
Refills: 0 | DISCHARGE

## 2024-03-25 RX ORDER — CELECOXIB 200 MG/1
200 CAPSULE ORAL EVERY 12 HOURS
Refills: 0 | Status: DISCONTINUED | OUTPATIENT
Start: 2024-03-25 | End: 2024-03-26

## 2024-03-25 RX ORDER — POLYETHYLENE GLYCOL 3350 17 G/17G
17 POWDER, FOR SOLUTION ORAL AT BEDTIME
Refills: 0 | Status: DISCONTINUED | OUTPATIENT
Start: 2024-03-25 | End: 2024-03-28

## 2024-03-25 RX ORDER — HYDROMORPHONE HYDROCHLORIDE 2 MG/ML
0.5 INJECTION INTRAMUSCULAR; INTRAVENOUS; SUBCUTANEOUS
Refills: 0 | Status: DISCONTINUED | OUTPATIENT
Start: 2024-03-25 | End: 2024-03-28

## 2024-03-25 RX ORDER — CEFAZOLIN SODIUM 1 G
2000 VIAL (EA) INJECTION ONCE
Refills: 0 | Status: COMPLETED | OUTPATIENT
Start: 2024-03-25 | End: 2024-03-25

## 2024-03-25 RX ORDER — FAMOTIDINE 10 MG/ML
1 INJECTION INTRAVENOUS
Refills: 0 | DISCHARGE

## 2024-03-25 RX ORDER — MAGNESIUM HYDROXIDE 400 MG/1
30 TABLET, CHEWABLE ORAL DAILY
Refills: 0 | Status: DISCONTINUED | OUTPATIENT
Start: 2024-03-25 | End: 2024-03-28

## 2024-03-25 RX ORDER — ONDANSETRON 8 MG/1
4 TABLET, FILM COATED ORAL ONCE
Refills: 0 | Status: DISCONTINUED | OUTPATIENT
Start: 2024-03-25 | End: 2024-03-25

## 2024-03-25 RX ORDER — HYDROMORPHONE HYDROCHLORIDE 2 MG/ML
2 INJECTION INTRAMUSCULAR; INTRAVENOUS; SUBCUTANEOUS EVERY 4 HOURS
Refills: 0 | Status: DISCONTINUED | OUTPATIENT
Start: 2024-03-25 | End: 2024-03-28

## 2024-03-25 RX ORDER — VALSARTAN 80 MG/1
80 TABLET ORAL DAILY
Refills: 0 | Status: DISCONTINUED | OUTPATIENT
Start: 2024-03-27 | End: 2024-03-28

## 2024-03-25 RX ADMIN — ATORVASTATIN CALCIUM 20 MILLIGRAM(S): 80 TABLET, FILM COATED ORAL at 21:46

## 2024-03-25 RX ADMIN — Medication 400 MILLIGRAM(S): at 22:00

## 2024-03-25 RX ADMIN — SODIUM CHLORIDE 500 MILLILITER(S): 9 INJECTION INTRAMUSCULAR; INTRAVENOUS; SUBCUTANEOUS at 23:49

## 2024-03-25 RX ADMIN — HYDROMORPHONE HYDROCHLORIDE 0.5 MILLIGRAM(S): 2 INJECTION INTRAMUSCULAR; INTRAVENOUS; SUBCUTANEOUS at 18:36

## 2024-03-25 RX ADMIN — Medication 0.5 MILLIGRAM(S): at 12:56

## 2024-03-25 RX ADMIN — Medication 1000 MILLIGRAM(S): at 23:00

## 2024-03-25 RX ADMIN — SODIUM CHLORIDE 100 MILLILITER(S): 9 INJECTION, SOLUTION INTRAVENOUS at 21:45

## 2024-03-25 RX ADMIN — CELECOXIB 200 MILLIGRAM(S): 200 CAPSULE ORAL at 21:46

## 2024-03-25 RX ADMIN — HYDROMORPHONE HYDROCHLORIDE 0.5 MILLIGRAM(S): 2 INJECTION INTRAMUSCULAR; INTRAVENOUS; SUBCUTANEOUS at 18:26

## 2024-03-25 RX ADMIN — SODIUM CHLORIDE 500 MILLILITER(S): 9 INJECTION INTRAMUSCULAR; INTRAVENOUS; SUBCUTANEOUS at 18:28

## 2024-03-25 RX ADMIN — APREPITANT 40 MILLIGRAM(S): 80 CAPSULE ORAL at 13:36

## 2024-03-25 RX ADMIN — CHLORHEXIDINE GLUCONATE 1 APPLICATION(S): 213 SOLUTION TOPICAL at 13:36

## 2024-03-25 RX ADMIN — CELECOXIB 200 MILLIGRAM(S): 200 CAPSULE ORAL at 22:00

## 2024-03-25 RX ADMIN — SENNA PLUS 2 TABLET(S): 8.6 TABLET ORAL at 21:47

## 2024-03-25 RX ADMIN — Medication 100 MILLIGRAM(S): at 21:45

## 2024-03-25 RX ADMIN — SODIUM CHLORIDE 75 MILLILITER(S): 9 INJECTION, SOLUTION INTRAVENOUS at 18:25

## 2024-03-25 NOTE — DISCHARGE NOTE PROVIDER - CARE PROVIDER_API CALL
Franklin Ibarra  Orthopaedic Surgery  [address]  [place] [address]  Phone: [phone]  Fax: [phone]  Follow Up Time:    Franklin Ibarra  Orthopaedic Surgery  [address], [address]  [place] [address]  Phone: [phone]  Fax: [phone]  Scheduled Appointment: 04/09/2024 02:45 PM

## 2024-03-25 NOTE — DISCHARGE NOTE PROVIDER - NSDCMRMEDTOKEN_GEN_ALL_CORE_FT
acetaminophen 325 mg oral tablet: 2 tab(s) orally every 6 hours, As needed, Mild Pain  aspirin 81 mg oral tablet: orally once a day  Lipitor 20 mg oral tablet: 1 tab(s) orally once a day (at bedtime)  meloxicam 15 mg oral tablet: 1 tab(s) orally once a day  metoprolol succinate 25 mg oral capsule, extended release: 1 cap(s) orally  Pepcid 20 mg oral tablet: 1 tab(s) orally once a day  sulfaSALAzine 500 mg oral tablet: 2 tab(s) orally 2 times a day  valsartan-hydrochlorothiazide 80 mg-12.5 mg oral tablet: 1 tab(s) orally once a day  Xanax 0.5 mg oral tablet: 1 tab(s) orally once a day as needed for  anxiety   acetaminophen 325 mg oral tablet: 2 tab(s) orally every 6 hours, As needed, Mild Pain  aspirin 81 mg oral tablet: orally once a day  Aspirin EC 81 mg oral delayed release tablet: 1 tab(s) orally every 12 hours take at least 2 hours before celebrex/meloxicam  Dilaudid 2 mg oral tablet: 1 tab(s) orally every 4 hours as needed for  moderate pain May take 2 tabs for severe pain MDD: 6  Lipitor 20 mg oral tablet: 1 tab(s) orally once a day (at bedtime)  meloxicam 15 mg oral tablet: 1 tab(s) orally once a day  meloxicam 15 mg oral tablet: 1 tab(s) orally once a day Take at least 2 hours after aspirin  metoprolol succinate 25 mg oral capsule, extended release: 1 cap(s) orally  Narcan 4 mg/0.1 mL nasal spray: 4 milligram(s) intranasally once as needed for overdose May repeat 2-3 minutes later in the alternate nostril, if needed while awaiting EMS.  omeprazole 20 mg oral delayed release capsule: 1 cap(s) orally once a day  Pepcid 20 mg oral tablet: 1 tab(s) orally once a day  sulfaSALAzine 500 mg oral tablet: 2 tab(s) orally 2 times a day  valsartan-hydrochlorothiazide 80 mg-12.5 mg oral tablet: 1 tab(s) orally once a day  Xanax 0.5 mg oral tablet: 1 tab(s) orally once a day as needed for  anxiety   acetaminophen 500 mg oral tablet: 2 tab(s) orally every 8 hours  aspirin 81 mg oral tablet: 1 tab(s) orally once a day resume AFTER twice daily dosing is completed  Aspirin EC 81 mg oral delayed release tablet: 1 tab(s) orally every 12 hours take at least 2 hours before celebrex/meloxicam  Dilaudid 2 mg oral tablet: 1 tab(s) orally every 4 hours as needed for  moderate pain May take 2 tabs for severe pain MDD: 6  Lipitor 20 mg oral tablet: 1 tab(s) orally once a day (at bedtime)  meloxicam 15 mg oral tablet: 1 tab(s) orally once a day Take at least 2 hours after aspirin  metoprolol succinate 25 mg oral capsule, extended release: 1 cap(s) orally once a day  Narcan 4 mg/0.1 mL nasal spray: 4 milligram(s) intranasally once as needed for overdose May repeat 2-3 minutes later in the alternate nostril, if needed while awaiting EMS.  omeprazole 20 mg oral delayed release capsule: 1 cap(s) orally once a day  Pepcid 20 mg oral tablet: 1 tab(s) orally once a day  polyethylene glycol 3350 oral powder for reconstitution: 17 gram(s) orally once a day (at bedtime)  senna leaf extract oral tablet: 2 tab(s) orally once a day (at bedtime)  sulfaSALAzine 500 mg oral tablet: 2 tab(s) orally 2 times a day  valsartan-hydrochlorothiazide 80 mg-12.5 mg oral tablet: 1 tab(s) orally once a day  Xanax 0.5 mg oral tablet: 1 tab(s) orally once a day as needed for  anxiety

## 2024-03-25 NOTE — CONSULT NOTE ADULT - SUBJECTIVE AND OBJECTIVE BOX
HPI: 68F HTN, HLD, GERD and OA has been combatting pain in right hip for several years which has progressively worsened.  Patient has tried multiple options for pain relief including OTC medication and as well as PT with minimal relief and has undergone elective replacement of right hip successfully.  Patient is currently resting in bed comfortable with good pain control.     REVIEW OF SYSTEMS:  CONSTITUTIONAL: No fever, weight loss, or fatigue  EYES: No eye pain, visual disturbances, or discharge  ENMT:  No difficulty hearing, tinnitus, vertigo; No sinus or throat pain  NECK: No pain or stiffness  RESPIRATORY: No cough, wheezing, chills or hemoptysis; No shortness of breath  CARDIOVASCULAR: No chest pain, palpitations, dizziness, or leg swelling  GASTROINTESTINAL: No abdominal or epigastric pain. No nausea, vomiting, or hematemesis; No diarrhea or constipation. No melena or hematochezia.  GENITOURINARY: No dysuria, frequency, hematuria, or incontinence  NEUROLOGICAL: No headaches, memory loss, loss of strength, numbness, or tremors  MUSCULOSKELETAL: No muscle or back pain      PAST MEDICAL & SURGICAL HISTORY:  Knee pain      Arthritis      Hypertension      HLD (hyperlipidemia)      GERD (gastroesophageal reflux disease)      Osteoarthritis      Pancreatic insufficiency      Chronic low back pain      S/P hip replacement, bilateral      Status post total left knee replacement      S/P total knee replacement, right      S/P  section      S/p bilateral carpal tunnel release      S/P tonsillectomy          SOCIAL HISTORY:  Tobacco; EtOH; Illicit Drugs    Allergies    No Known Allergies    Intolerances        MEDICATIONS  (STANDING):  lactated ringers. 1000 milliLiter(s) (75 mL/Hr) IV Continuous <Continuous>    MEDICATIONS  (PRN):  HYDROmorphone  Injectable 0.2 milliGRAM(s) IV Push every 10 minutes PRN Moderate Pain (4 - 6)  HYDROmorphone  Injectable 0.5 milliGRAM(s) IV Push every 10 minutes PRN Severe Pain (7 - 10)  ondansetron Injectable 4 milliGRAM(s) IV Push once PRN Nausea and/or Vomiting      FAMILY HISTORY:  FH: heart disease (Father)    FH: hypertension (Mother)    FH: type 2 diabetes (Father)        Vital Signs Last 24 Hrs  T(C): 36.3 (25 Mar 2024 18:00), Max: 36.3 (25 Mar 2024 18:00)  T(F): 97.4 (25 Mar 2024 18:00), Max: 97.4 (25 Mar 2024 18:00)  HR: 71 (25 Mar 2024 18:00) (71 - 75)  BP: 110/53 (25 Mar 2024 18:00) (110/53 - 129/61)  BP(mean): --  RR: 15 (25 Mar 2024 18:00) (14 - 15)  SpO2: 96% (25 Mar 2024 18:00) (96% - 100%)    Parameters below as of 25 Mar 2024 18:00  Patient On (Oxygen Delivery Method): nasal cannula  O2 Flow (L/min): 2      PHYSICAL EXAM:    GENERAL: NAD, well-developed  HEAD:  Atraumatic, Normocephalic  EYES: EOMI, PERRLA, conjunctiva and sclera clear  ENMT: No tonsillar erythema, exudates, or enlargement; Moist mucous membranes, Good dentition, No lesions  NECK: Supple, No JVD, Normal thyroid  NERVOUS SYSTEM:  Alert & Oriented X3, Good concentration;  CHEST/LUNG: Clear to auscultation bilaterally; No rales, rhonchi, wheezing, or rubs  HEART: Regular rate and rhythm; No murmurs, rubs, or gallops  ABDOMEN: Soft, Nontender, Nondistended; Bowel sounds present  EXTREMITIES:  2+ Peripheral Pulses, No clubbing, cyanosis, or edema      LABS:              CAPILLARY BLOOD GLUCOSE          RADIOLOGY & ADDITIONAL STUDIES:    EKG:   Personally Reviewed:  [ ] YES     Imaging:   Personally Reviewed:  [ ] YES     Consultant(s) Notes Reviewed:      Care Discussed with Consultants/Other Providers:

## 2024-03-25 NOTE — DISCHARGE NOTE PROVIDER - HOSPITAL COURSE
This patient is a 68 y.o. female with failure of right total hip replacement.  After admission on 3/25/24 and receiving pre-operative parenteral prophylactic antibiotics, the patient underwent an uncomplicated Revision Right THR by Dr. Ibarra.    A medical consultation from the Hospitalist service was obtained for post-operative medical co-management.   Typical Physical & occupational therapy modalities post Revision Right THR were performed including ambulation training, range of motion, ADL's, and transfers.  Aspirin 81mg q 12 h was given for DVT prophylaxis.  The patient had a clean appearing surgical incision with no sign of surgical site infections and had a stable neuro / vascular exam of the operated extremity.     Discharge and Orthopedic Care instructions were delineated in the Discharge Plan and reviewed with the patient.   All medications were delineated in the medication reconciliation tool and key points were reviewed with the patient.   The patient was deemed stable from an Orthopedic & medical standpoint for discharge today.  She will follow up with Dr. Ibarra for further orthopedic care upon ( discharge from the rehab facility ) or (completion of Home care PT).  Patient is going home with home care (PT/OT/Skilled Nursing) This patient is a 68 y.o. female with failure of right total hip replacement.  After admission on 3/25/24 and receiving pre-operative parenteral prophylactic antibiotics, the patient underwent an uncomplicated Revision Right THR by Dr. Ibarra.    A medical consultation from the Hospitalist service was obtained for post-operative medical co-management.   Typical Physical & occupational therapy modalities post Revision Right THR were performed including ambulation training, range of motion, ADL's, and transfers.  Aspirin 81mg q 12 h was given for DVT prophylaxis.  The patient had a clean appearing surgical incision with no sign of surgical site infections and had a stable neuro / vascular exam of the operated extremity.     Discharge and Orthopedic Care instructions were delineated in the Discharge Plan and reviewed with the patient.   All medications were delineated in the medication reconciliation tool and key points were reviewed with the patient.   The patient was deemed stable from an Orthopedic & medical standpoint for discharge today.  She will follow up with Dr. Ibarra for further orthopedic care upon completion of Home care PT.  Patient is going home with home care (PT/OT/Skilled Nursing) This patient is a 68 y.o. female with failure of right total hip replacement / Liner wear and Osteolysis.  After admission on 3/25/24 and receiving pre-operative parenteral prophylactic antibiotics, the patient underwent an uncomplicated Revision Right   THR (Posterior) by Dr. Ibarra.    A medical consultation from the Hospitalist service was obtained for post-operative medical co-management.   Typical Physical & occupational therapy modalities post Revision Right THR were performed including ambulation training, range of motion, ADL's,   transfers, and Posterior Dislocation precautions.  Aspirin 81mg q 12 h was given for DVT prophylaxis.  The patient had a clean appearing ROLY with no sign of surgical site infections and had a stable neuro / vascular exam of the operated extremity.     Discharge and Orthopedic Care instructions were delineated in the Discharge Plan and reviewed with the patient.   All medications were delineated in the medication reconciliation tool and key points were reviewed with the patient.   The patient was deemed stable from an Orthopedic & medical standpoint for Home discharge with HOme Care / HOme PT today.  She will follow up with Dr. Ibarra for further orthopedic care upon completion of Home care PT.  Patient is going home with home care (PT/OT/Skilled Nursing)

## 2024-03-25 NOTE — BRIEF OPERATIVE NOTE - ESTIMATED BLOOD LOSS
300 PAST MEDICAL HISTORY:  Anorexia     Borderline personality disorder     Depression     Irritable bowel syndrome (IBS)     Post traumatic stress disorder (PTSD)

## 2024-03-25 NOTE — DISCHARGE NOTE PROVIDER - NSDCCPCAREPLAN_GEN_ALL_CORE_FT
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 Take medications as prescribed. DO NOT drive, operate machinery, and/or make important decisions while on prescription pain medication. DO NOT hesitate to call Doctor's office with questions or concerns. of the precautions for total hip replacement. Your surgeon will tell you when and if you can move beyond these limitations.  • Do not bend your hip more than 90 degrees   • Do not cross your legs or ankles when laying sitting or standing.  • Do not raise your operated leg up with your knee straight.  • DO NOT bend over at your waist.  • Avoid sitting in low, soft chairs such as sofas and car seats. You should sit on a chair using firm pillows to raise the height of the seat.  • Make sure your bed level is high, so that you maintain proper leg positioning when sitting on the side, or getting in or out.  • Avoid sitting in low, soft chairs, such as sofas, easy chairs etc.   • When entering and traveling by car:      -Sit in the front passenger seat. Make sure that the car seat is Follow all verbal and written instructions. Take medications as prescribed. DO NOT drive, operate machinery, and/or make important decisions while on prescription pain medication. DO NOT hesitate to call Doctor's office with questions or concerns. the way back and semi-reclined before entering.  • Do not allow your knees to come together when sitting or lying in bed.  • Do not take a tub bath yet.   • Do not resume driving until you have your surgeon’s permission.  Wound Care  • You may use an ice pack for 20 minutes on and 20 minutes off to decrease pain and swelling.  • Follow up with your Primary Care Physician within 2 weeks from arrival home for examination and blood work.     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 Take medications as prescribed.. DO NOT hesitate to call Doctor's office with questions or concerns. of the precautions for total hip replacement. Your surgeon will tell you when and if you can move beyond these limitations.  • Do not bend your hip more than 90 degrees   • Do not cross your legs or ankles when laying sitting or standing.  • Do not raise your operated leg up with your knee straight.  • DO NOT bend over at your waist.  • Avoid sitting in low, soft chairs such as sofas and car seats. You should sit on a chair using firm pillows to raise the height of the seat.  • Make sure your bed level is high, so that you maintain proper leg positioning when sitting on the side, or getting in or out.  • Avoid sitting in low, soft chairs, such as sofas, easy chairs etc.   • When entering and traveling by car:      -Sit in the front passenger seat. Make sure that the car seat is Follow all verbal and written instructions.  DO NOT hesitate to call Doctor's office with questions or concerns. the way back and semi-reclined before entering.  • Do not allow your knees to come together when sitting or lying in bed.  • Do not take a tub bath yet.  Do not resume driving.  Wound Care  • Apply  an ice pack for 30-45  minutes on Right hip & thigh every 3-4 hours daily to decrease pain and swelling.  - The ROLY patech dressing may get wet briefly in shower. COver in Ziplok bag to heep dry.   - ROLY patch to be removed on 4/1 by PT or RN. INcision may be left open if no drainage is present.   - INcision may get wet briefly in shower thereafter if no drainage present.  - 1st Routine office visit with Surgeon as scheduled.  • See your PCP within 2 weeks from arrival home for examination, blood work, and review of medication.  - Call the Surgeon for new redness around incision, Severe hip pain, fall or Right leg injury.

## 2024-03-25 NOTE — PRE-OP CHECKLIST - IDENTIFICATION BAND VERIFIED
PROCEDURES:  Thoracotomy with bronchoscopy 02-Mar-2023 16:54:52  Vinicio Donald  Drainage, empyema, pleural cavity 02-Mar-2023 16:55:11  Vinicio Donald  Removal of PleurX catheter from right pleural cavity 02-Mar-2023 16:56:24  Vinicio Donald  
done

## 2024-03-25 NOTE — DISCHARGE NOTE PROVIDER - PROVIDER TOKENS
PROVIDER:[TOKEN:[[id number]]] PROVIDER:[TOKEN:[[id number]],SCHEDULEDAPPT:[04/09/2024],SCHEDULEDAPPTTIME:[02:45 PM]]

## 2024-03-25 NOTE — DISCHARGE NOTE PROVIDER - NSDCFUSCHEDAPPT_GEN_ALL_CORE_FT
Franklin Ibarra  [facility] Physician [facility]  ORTHOSURG [address] [facility]  Scheduled Appointment: 04/09/2024    Franklin Ibarra  [facility]  ORTHOSURG [address] [facility]  Scheduled Appointment: 05/07/2024    Starr Castaneda  [facility]  INTMED [address] [facility]  Scheduled Appointment: 06/04/2024

## 2024-03-25 NOTE — CONSULT NOTE ADULT - ASSESSMENT
68F HTN, HLD, GERD and OA admitted for aftercare following Right THR    S/P Right THR POD 0    Continue Bowel and pain control regimen;    Incentive Spirometer for lung expansion;   Monitor Hgb and follow up electrolytes.      HTN/HLD  Metoprolol + Statin  Hold ARB and HCTZ    GERD  Protonix    Anxiety  Xanax PRN    Diet  Regular    DVT Prophylaxis   Aspirin BID    Disposition  Discharge planning pending hospital course

## 2024-03-25 NOTE — DISCHARGE NOTE PROVIDER - NSDCCPTREATMENT_GEN_ALL_CORE_FT
PRINCIPAL PROCEDURE  Procedure: Revision of right total hip arthroplasty by posterior approach  Findings and Treatment: You have a ROLY dressing. You may shower. Disconnect ROLY battery prior to showering. Reconnect battery after showering and press orange button to resume ROLY power. Remove ROLY dressing on post-op day #7.  Keep incision clean. DO NOT APPLY ANYTHING to incision site (salves/ointments/creams). Do not scrub incision site. Pat dry after shower.  Suture/Prineo removal 2 weeks after surgery at Surgeon's office.     PRINCIPAL PROCEDURE  Procedure: Revision of right total hip arthroplasty by posterior approach  Findings and Treatment: You have a ROLY dressing: Safe to use dressing in shower as directed. Must disconnect battery pack from tubing and set aside somewhere dry. Once finished showering, pat dressing dry and reattach battery pack. Press orange button to resume therapy. Do not soak, scrub, or submerge incision in water. After 7 days remove entire dressing and dispose in trash. May leave incision open to air if no drainage. dressing. You may shower. Disconnect ROLY dressing: Safe to use dressing in shower as directed. Must disconnect battery pack from tubing and set aside somewhere dry. Once finished showering, pat dressing dry and reattach battery pack. Press orange button to resume therapy. Do not soak, scrub, or submerge incision in water. After 7 days remove entire dressing and dispose in trash. May leave incision open to air if no drainage. battery prior to showering. Reconnect battery after showering and press orange button to resume ROLY dressing: Safe to use dressing in shower as directed. Must disconnect battery pack from tubing and set aside somewhere dry. Once finished showering, pat dressing dry and reattach battery pack. Press orange button to resume therapy. Do not soak, scrub, or submerge incision in water. After 7 days remove entire dressing and dispose in trash. May leave incision open to air if no drainage. power. Remove ROLY dressing: Safe to use dressing in shower as directed. Must disconnect battery pack from tubing and set aside somewhere dry. Once finished showering, pat dressing dry and reattach battery pack. Press orange button to resume therapy. Do not soak, scrub, or submerge incision in water. After 7 days remove entire dressing and dispose in trash. May leave incision open to air if no drainage. dressing on post-op day #7.  Keep incision clean. DO NOT APPLY ANYTHING to incision site (salves/ointments/creams). Do not scrub incision site. Pat dry after shower.  Prineo removal 2 weeks after surgery at Surgeon's office.

## 2024-03-25 NOTE — BRIEF OPERATIVE NOTE - NSICDXBRIEFPROCEDURE_GEN_ALL_CORE_FT
PROCEDURES:  Revision of right total hip arthroplasty by posterior approach 25-Mar-2024 17:57:03  Joaquin Quinn

## 2024-03-26 ENCOUNTER — TRANSCRIPTION ENCOUNTER (OUTPATIENT)
Age: 69
End: 2024-03-26

## 2024-03-26 LAB
ANION GAP SERPL CALC-SCNC: 10 MMOL/L — SIGNIFICANT CHANGE UP (ref 5–17)
BUN SERPL-MCNC: 12 MG/DL — SIGNIFICANT CHANGE UP (ref 7–23)
CALCIUM SERPL-MCNC: 8.9 MG/DL — SIGNIFICANT CHANGE UP (ref 8.4–10.5)
CHLORIDE SERPL-SCNC: 104 MMOL/L — SIGNIFICANT CHANGE UP (ref 96–108)
CO2 SERPL-SCNC: 27 MMOL/L — SIGNIFICANT CHANGE UP (ref 22–31)
CREAT SERPL-MCNC: 0.62 MG/DL — SIGNIFICANT CHANGE UP (ref 0.5–1.3)
EGFR: 96 ML/MIN/1.73M2 — SIGNIFICANT CHANGE UP
GLUCOSE SERPL-MCNC: 147 MG/DL — HIGH (ref 70–99)
GRAM STN FLD: SIGNIFICANT CHANGE UP
HCT VFR BLD CALC: 33.1 % — LOW (ref 34.5–45)
HGB BLD-MCNC: 11 G/DL — LOW (ref 11.5–15.5)
MCHC RBC-ENTMCNC: 29.8 PG — SIGNIFICANT CHANGE UP (ref 27–34)
MCHC RBC-ENTMCNC: 33.2 GM/DL — SIGNIFICANT CHANGE UP (ref 32–36)
MCV RBC AUTO: 89.7 FL — SIGNIFICANT CHANGE UP (ref 80–100)
NRBC # BLD: 0 /100 WBCS — SIGNIFICANT CHANGE UP (ref 0–0)
PLATELET # BLD AUTO: 198 K/UL — SIGNIFICANT CHANGE UP (ref 150–400)
POTASSIUM SERPL-MCNC: 3.8 MMOL/L — SIGNIFICANT CHANGE UP (ref 3.5–5.3)
POTASSIUM SERPL-SCNC: 3.8 MMOL/L — SIGNIFICANT CHANGE UP (ref 3.5–5.3)
RBC # BLD: 3.69 M/UL — LOW (ref 3.8–5.2)
RBC # FLD: 13.1 % — SIGNIFICANT CHANGE UP (ref 10.3–14.5)
SODIUM SERPL-SCNC: 141 MMOL/L — SIGNIFICANT CHANGE UP (ref 135–145)
SPECIMEN SOURCE: SIGNIFICANT CHANGE UP
WBC # BLD: 12.58 K/UL — HIGH (ref 3.8–10.5)
WBC # FLD AUTO: 12.58 K/UL — HIGH (ref 3.8–10.5)

## 2024-03-26 PROCEDURE — 99233 SBSQ HOSP IP/OBS HIGH 50: CPT

## 2024-03-26 PROCEDURE — 93970 EXTREMITY STUDY: CPT | Mod: 26

## 2024-03-26 RX ORDER — MELOXICAM 15 MG/1
1 TABLET ORAL
Refills: 0 | DISCHARGE

## 2024-03-26 RX ORDER — MELOXICAM 15 MG/1
15 TABLET ORAL DAILY
Refills: 0 | Status: DISCONTINUED | OUTPATIENT
Start: 2024-03-26 | End: 2024-03-28

## 2024-03-26 RX ORDER — ASPIRIN/CALCIUM CARB/MAGNESIUM 324 MG
1 TABLET ORAL
Qty: 0 | Refills: 0 | DISCHARGE

## 2024-03-26 RX ORDER — NALOXONE HYDROCHLORIDE 4 MG/.1ML
4 SPRAY NASAL
Qty: 1 | Refills: 0
Start: 2024-03-26

## 2024-03-26 RX ORDER — OMEPRAZOLE 10 MG/1
1 CAPSULE, DELAYED RELEASE ORAL
Qty: 30 | Refills: 0
Start: 2024-03-26 | End: 2024-04-24

## 2024-03-26 RX ORDER — HYDROMORPHONE HYDROCHLORIDE 2 MG/ML
1 INJECTION INTRAMUSCULAR; INTRAVENOUS; SUBCUTANEOUS
Qty: 42 | Refills: 0
Start: 2024-03-26 | End: 2024-04-01

## 2024-03-26 RX ORDER — POLYETHYLENE GLYCOL 3350 17 G/17G
17 POWDER, FOR SOLUTION ORAL
Qty: 0 | Refills: 0 | DISCHARGE
Start: 2024-03-26

## 2024-03-26 RX ORDER — LANOLIN ALCOHOL/MO/W.PET/CERES
3 CREAM (GRAM) TOPICAL AT BEDTIME
Refills: 0 | Status: DISCONTINUED | OUTPATIENT
Start: 2024-03-26 | End: 2024-03-28

## 2024-03-26 RX ORDER — SENNA PLUS 8.6 MG/1
2 TABLET ORAL
Qty: 0 | Refills: 0 | DISCHARGE
Start: 2024-03-26

## 2024-03-26 RX ORDER — MELOXICAM 15 MG/1
1 TABLET ORAL
Qty: 30 | Refills: 0
Start: 2024-03-26 | End: 2024-04-24

## 2024-03-26 RX ORDER — ACETAMINOPHEN 500 MG
2 TABLET ORAL
Qty: 0 | Refills: 0 | DISCHARGE
Start: 2024-03-26

## 2024-03-26 RX ORDER — METOPROLOL TARTRATE 50 MG
1 TABLET ORAL
Qty: 0 | Refills: 0 | DISCHARGE

## 2024-03-26 RX ORDER — ASPIRIN/CALCIUM CARB/MAGNESIUM 324 MG
1 TABLET ORAL
Qty: 56 | Refills: 0
Start: 2024-03-26 | End: 2024-04-22

## 2024-03-26 RX ADMIN — Medication 1000 MILLIGRAM(S): at 05:44

## 2024-03-26 RX ADMIN — Medication 3 MILLIGRAM(S): at 23:45

## 2024-03-26 RX ADMIN — MELOXICAM 15 MILLIGRAM(S): 15 TABLET ORAL at 13:00

## 2024-03-26 RX ADMIN — SODIUM CHLORIDE 100 MILLILITER(S): 9 INJECTION, SOLUTION INTRAVENOUS at 05:48

## 2024-03-26 RX ADMIN — Medication 1000 MILLIGRAM(S): at 14:19

## 2024-03-26 RX ADMIN — Medication 1000 MILLIGRAM(S): at 05:48

## 2024-03-26 RX ADMIN — HYDROMORPHONE HYDROCHLORIDE 4 MILLIGRAM(S): 2 INJECTION INTRAMUSCULAR; INTRAVENOUS; SUBCUTANEOUS at 13:01

## 2024-03-26 RX ADMIN — Medication 1000 MILLIGRAM(S): at 18:22

## 2024-03-26 RX ADMIN — PANTOPRAZOLE SODIUM 40 MILLIGRAM(S): 20 TABLET, DELAYED RELEASE ORAL at 05:44

## 2024-03-26 RX ADMIN — Medication 81 MILLIGRAM(S): at 05:44

## 2024-03-26 RX ADMIN — HYDROMORPHONE HYDROCHLORIDE 4 MILLIGRAM(S): 2 INJECTION INTRAMUSCULAR; INTRAVENOUS; SUBCUTANEOUS at 14:00

## 2024-03-26 RX ADMIN — Medication 25 MILLIGRAM(S): at 05:44

## 2024-03-26 RX ADMIN — HYDROMORPHONE HYDROCHLORIDE 4 MILLIGRAM(S): 2 INJECTION INTRAMUSCULAR; INTRAVENOUS; SUBCUTANEOUS at 22:58

## 2024-03-26 RX ADMIN — Medication 1000 MILLIGRAM(S): at 22:00

## 2024-03-26 RX ADMIN — FAMOTIDINE 20 MILLIGRAM(S): 10 INJECTION INTRAVENOUS at 21:57

## 2024-03-26 RX ADMIN — SENNA PLUS 2 TABLET(S): 8.6 TABLET ORAL at 22:00

## 2024-03-26 RX ADMIN — ATORVASTATIN CALCIUM 20 MILLIGRAM(S): 80 TABLET, FILM COATED ORAL at 21:57

## 2024-03-26 RX ADMIN — Medication 81 MILLIGRAM(S): at 18:22

## 2024-03-26 RX ADMIN — Medication 101.6 MILLIGRAM(S): at 05:48

## 2024-03-26 RX ADMIN — HYDROMORPHONE HYDROCHLORIDE 4 MILLIGRAM(S): 2 INJECTION INTRAMUSCULAR; INTRAVENOUS; SUBCUTANEOUS at 21:58

## 2024-03-26 RX ADMIN — Medication 100 MILLIGRAM(S): at 05:47

## 2024-03-26 RX ADMIN — Medication 1000 MILLIGRAM(S): at 21:57

## 2024-03-26 NOTE — OCCUPATIONAL THERAPY INITIAL EVALUATION ADULT - ADL RETRAINING, OT EVAL
Pt will complete LB dressing, with AE as needed, with modified independence +set-up by 2-3 sessions. Pt will complete grooming activity while standing at the sink +RW with modified independence by 2-3 sessions.

## 2024-03-26 NOTE — PHYSICAL THERAPY INITIAL EVALUATION ADULT - PERTINENT HX OF CURRENT PROBLEM, REHAB EVAL
History of Present Illness	  this is a 69 y/o female who had right hip replacement about 20 yrs ago, has been having pain mostly front of right hip, worse when sitting; tests show that right hip implant has worn out; to have revision of right hip replacement

## 2024-03-26 NOTE — CARE COORDINATION ASSESSMENT. - PRO ARRIVE FROM
DX: 69 y/o female who is scheduled for revision of right hip replacement on 3/25/24.    CM met patient at bedside. Patient alert times 3.  Patient lives with spouse at home [place] [phone] , CM spoke with him this morning. Patient stated she will be staying at sons house for 2 weeks.  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  Patient stated has 1 step to enter in the house.     CM verified:     PCP: Dr. Starr Castaneda  [phone].  Insurance:  Medicare/ [employer].   Pharmacy: [facility].  Winter: Patient stated No.      CM explained about homecare services with a verbal understanding. Patient choice [facility] homecare services. CM sent referral awaiting a response back. Will continue to follow case./home

## 2024-03-26 NOTE — PHYSICAL THERAPY INITIAL EVALUATION ADULT - ADDITIONAL COMMENTS
Pt was ambulating short distances without  AD PTA. Pt states she was able to ambulate around a supermarket pushing a cart.  Has RW and commode. Will stay with son upon d/c as he works from home and can provide assistance. Has tub shower. Independent with ADLs Pt was ambulating short distances without  AD PTA. Pt states she was able to ambulate around a supermarket pushing a cart.  Has RW and commode. Will stay with son upon d/c as he works from home and can provide assistance. Has tub shower. Independent with ADLs. Pt is renting a hip chair and hospital bed. Pt was ambulating short distances without  AD PTA. Pt states she was able to ambulate around a supermarket pushing a cart.  Has RW and commode. Will stay with son upon d/c as he works from home and can provide assistance. Has tub shower. Independent with ADLs. Pt is renting a hip chair and hospital bed.Sons home has no steps to enter. 5 steps inside, but pt does not need to do. Stay main level.

## 2024-03-26 NOTE — DISCHARGE NOTE NURSING/CASE MANAGEMENT/SOCIAL WORK - PATIENT PORTAL LINK FT
You can access the FollowMyHealth Patient Portal offered by [facility] by registering at the following website: [email or web address]. By joining [facility] FollowMyHealth portal, you will also be able to view your health information using other applications (apps) compatible with our system.

## 2024-03-26 NOTE — OCCUPATIONAL THERAPY INITIAL EVALUATION ADULT - DIAGNOSIS, OT EVAL
Pt with impaired ROM/strength in R hip, posterior THR precautions, and impaired balance impacting ability to complete ADLs, IADLs, functional mobility/transfers.

## 2024-03-26 NOTE — CARE COORDINATION ASSESSMENT. - NSCAREPROVIDERS_GEN_ALL_CORE_FT
CARE PROVIDERS:  Administration: Lolly Kearns  Administration: Nain Dailey  Admitting: Franklin Ibarra  Attending: Franklin Ibarra  Consultant: Faizan El  Consultant: Victor Manuel Cardoso  Nurse: Cristiana Cope  Nurse: Naveed Sunshine  Nurse: Lianne Philip  Nurse: Ericka Sandoval  Nurse: Aleksandra See  Nurse: Raul Luque  Occupational Therapy: Sanjana Moncada  Occupational Therapy: Tricia Byrd  Ordered: Physician, Ordering  Override: Aleksandra See  Override: Keysha Webb  PCA/Nursing Assistant: Fito Mustafa  PCA/Nursing Assistant: Rachel Ibarra  Physical Therapy: Jonelle Guallpa  Physical Therapy: Scotty Chapman  Primary Team: Jacquelyn Ramos  Primary Team: Shashank Carter  Primary Team: Pawan Ramirez  Primary Team: Blair Saini  Primary Team: Luis Davis  Primary Team: Francheska Echevarria  Primary Team: Joaquin Quinn  Primary Team: Herrera Raymond  Primary Team: Valentin Macedo  Registered Dietitian: Mariann Up  : Flaca De La Torre  Team: JANINE  Hospitalists, Team  UR// Supp. Assoc.: Edie Banuelos

## 2024-03-26 NOTE — OCCUPATIONAL THERAPY INITIAL EVALUATION ADULT - ADDITIONAL COMMENTS
Pt reports she is planning on staying with her son at his house for the next 2-3 weeks as he works from home and is available to assist. The private home is a split level with no SHANEKA and 5 steps inside to the kitchen. Pt reports she does not need to use the 5 steps. Pt will be staying on main level with a tub bathroom. Pt reports she ordered a hospital bed and a hip chair to be sent to her private home. Pt owns a RW and commode. Pt reports lately she has had trouble ambulating 2* hip pain but ambulated without a device. Pt reports she was independent with all ADLs and used a sock aide for LB dressing.   Pt completed side-steps at EOB and ambulated 5ft +RW with MinAx2.

## 2024-03-26 NOTE — PROGRESS NOTE ADULT - SUBJECTIVE AND OBJECTIVE BOX
Post Op Day #1    SUBJECTIVE    67yo Female status post Right THR .   Patient is alert and comfortable.    Pain is controlled with current pain regimen.  Denies nausea, vomiting, chest pain, shortness of breath, abdominal pain or fever.   No new complaints.    OBJECTIVE    Vital Signs Last 24 Hrs  T(C): 36.7 (26 Mar 2024 07:40), Max: 36.8 (25 Mar 2024 23:17)  T(F): 98 (26 Mar 2024 07:40), Max: 98.2 (25 Mar 2024 23:17)  HR: 69 (26 Mar 2024 07:40) (60 - 75)  BP: 114/63 (26 Mar 2024 07:40) (98/60 - 129/61)  BP(mean): --  RR: 16 (26 Mar 2024 07:40) (14 - 20)  SpO2: 95% (26 Mar 2024 07:40) (92% - 100%)    Parameters below as of 26 Mar 2024 07:40  Patient On (Oxygen Delivery Method): room air      I&O's Summary    25 Mar 2024 07:01  -  26 Mar 2024 07:00  --------------------------------------------------------  IN: 2600 mL / OUT: 1950 mL / NET: 650 mL        PHYSICAL EXAM    Right Hip ROLY dressing  is clean, dry and intact.   Bilateral calf tenderness to palpation   Sensation to light touch is grossly intact distally.   Motor function distally is intact.   No foot drop.   (2+) dorsalis pedis pulse. Capillary refill is less than 2 seconds. No cyanosis.                          11.0<L>  12.58<H> )-----------( 198      ( 26 Mar 2024 07:49 )             33.1<L>  26 Mar 2024 07:49    26 Mar 2024 07:49    141    |  104    |  12     ----------------------------<  147<H>  3.8     |  27     |  0.62     Ca    8.9        26 Mar 2024 07:49        ASSESSMENT AND PLAN    - Orthopedically stable  - Calf tenderness: dopplers ordered  - DVT prophylaxis: PAS + Aspirin 81mg every 12 hours  -  HO prophylaxis: Meloxicam 15mg PO daily x21 days  - Continue physical therapy and occupational therapy  - Weight bearing as tolerated of the right lower extremity with assistance of a walker  - Incentive spirometry encouraged  - Pain control as clinically indicated  - Disposition: Home when medically stable and cleared by PT/OT

## 2024-03-26 NOTE — PHYSICAL THERAPY INITIAL EVALUATION ADULT - PRECAUTIONS/LIMITATIONS, REHAB EVAL
Posterior hip precautions: No IR, no add, no flexion >90 degreees/fall precautions/right hip precautions/surgical precautions

## 2024-03-26 NOTE — CARE COORDINATION ASSESSMENT. - OTHER PERTINENT DISCHARGE PLANNING INFORMATION:
69 y/o female who is scheduled for revision of right hip replacement on 3/25/24. Met patient at bedside.  Explained role of CM, verbalized understanding. Pt was made aware a CM will remain available through hospitalization.  Contact information given in discharge/ transitions resource folder.

## 2024-03-26 NOTE — PHYSICAL THERAPY INITIAL EVALUATION ADULT - MD ORDER
OOB. PT evaluate and treat. WBAT right LE. Posterior hip precautions: No IR, no add, no flexion >90 degrees

## 2024-03-26 NOTE — CARE COORDINATION ASSESSMENT. - NSDCPLANSERVICES_GEN_ALL_CORE
DX: 69 y/o female who is scheduled for revision of right hip replacement on 3/25/24.    CM met patient at bedside. Patient alert times 3.  Patient lives with spouse at home Yan [phone] , CM spoke with him this morning. Patient stated she will be staying at sons house for 2 weeks.  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  Patient stated has 1 step to enter in the house.     CM verified:     PCP: Dr. Starr Castaneda  [phone].  Insurance:  Medicare/ [employer].   Pharmacy: [facility].  [place]: Patient stated No.      CM explained about homecare services with a verbal understanding. Patient choice [facility] homecare services. CM sent referral awaiting a response back. Will continue to follow case./Home Care

## 2024-03-26 NOTE — PATIENT CHOICE NOTE. - NSPTCHOICESTATE_GEN_ALL_CORE

## 2024-03-26 NOTE — DISCHARGE NOTE NURSING/CASE MANAGEMENT/SOCIAL WORK - NSSCNAMETXT_GEN_ALL_CORE
[facility] care agency [phone] will reach out to you within 24-72 hours of your discharge to schedule home care visit/eval appointment with you. Please call agency for any queries regarding home care services

## 2024-03-26 NOTE — PATIENT CHOICE NOTE. - NSPTCHOICENOTES_GEN_ALL_CORE
[facility] care agency [phone] will reach out to you within 24-72 hours of your discharge to schedule home care visit/eval appointment with you. Please call agency for any queries regarding home care services.

## 2024-03-26 NOTE — PHYSICAL THERAPY INITIAL EVALUATION ADULT - ACTIVE RANGE OF MOTION EXAMINATION, REHAB EVAL
Right ankle foot WNL right hip and knee decreased due to sx and pain/bilateral upper extremity Active ROM was WFL (within functional limits)/Left LE Active ROM was WFL (within functional limits)/deficits as listed below

## 2024-03-26 NOTE — PHYSICAL THERAPY INITIAL EVALUATION ADULT - GAIT TRAINING, PT EVAL
Pt will ambulate 150 feet with RW independently in 2-3 sessions . Pt will negotiate 10 steps with handrail and cane independently in 2-3 sessions

## 2024-03-26 NOTE — PROGRESS NOTE ADULT - ASSESSMENT
68F HTN, HLD, GERD and OA admitted for aftercare following Right THR    S/P Right THR POD 1  Continue Bowel and pain control regimen;    Incentive Spirometer for lung expansion;   Monitor Hgb and follow up electrolytes.      HTN/HLD  Metoprolol + Statin  Hold ARB and HCTZ    GERD  Protonix    Anxiety  Xanax PRN    Diet  Regular    DVT Prophylaxis   Aspirin BID    Disposition  Patient medically optimized for discharge home if cleared by PT and Ortho.

## 2024-03-26 NOTE — PROGRESS NOTE ADULT - SUBJECTIVE AND OBJECTIVE BOX
Subjective: Patient seen and examined. No overnight events. Doing well.    MEDICATIONS  (STANDING):  acetaminophen     Tablet .. 1000 milliGRAM(s) Oral every 8 hours  aspirin enteric coated 81 milliGRAM(s) Oral every 12 hours  atorvastatin 20 milliGRAM(s) Oral at bedtime  celecoxib 200 milliGRAM(s) Oral every 12 hours  famotidine    Tablet 20 milliGRAM(s) Oral at bedtime  lactated ringers. 1000 milliLiter(s) (100 mL/Hr) IV Continuous <Continuous>  metoprolol succinate ER 25 milliGRAM(s) Oral daily  pantoprazole    Tablet 40 milliGRAM(s) Oral before breakfast  polyethylene glycol 3350 17 Gram(s) Oral at bedtime  senna 2 Tablet(s) Oral at bedtime  sulfaSALAzine 1000 milliGRAM(s) Oral two times a day    MEDICATIONS  (PRN):  ALPRAZolam 0.5 milliGRAM(s) Oral daily PRN for anxiety  aluminum hydroxide/magnesium hydroxide/simethicone Suspension 30 milliLiter(s) Oral four times a day PRN Indigestion  HYDROmorphone   Tablet 2 milliGRAM(s) Oral every 4 hours PRN Moderate Pain (4 - 6)  HYDROmorphone   Tablet 4 milliGRAM(s) Oral every 4 hours PRN Severe Pain (7 - 10)  HYDROmorphone  Injectable 0.5 milliGRAM(s) IV Push every 3 hours PRN Breakthrough pain  magnesium hydroxide Suspension 30 milliLiter(s) Oral daily PRN Constipation  ondansetron Injectable 4 milliGRAM(s) IV Push every 6 hours PRN Nausea and/or Vomiting      Allergies    No Known Allergies    Intolerances        Vital Signs Last 24 Hrs  T(C): 36.7 (26 Mar 2024 07:40), Max: 36.8 (25 Mar 2024 23:17)  T(F): 98 (26 Mar 2024 07:40), Max: 98.2 (25 Mar 2024 23:17)  HR: 69 (26 Mar 2024 07:40) (60 - 75)  BP: 114/63 (26 Mar 2024 07:40) (98/60 - 129/61)  BP(mean): --  RR: 16 (26 Mar 2024 07:40) (14 - 20)  SpO2: 95% (26 Mar 2024 07:40) (92% - 100%)    Parameters below as of 26 Mar 2024 07:40  Patient On (Oxygen Delivery Method): room air        PHYSICAL EXAM:  GENERAL: NAD, well-groomed, well-developed  HEAD:  Atraumatic, Normocephalic  ENMT: Moist mucous membranes,   NECK: Supple, No JVD, Normal thyroid  NERVOUS SYSTEM:  All 4 extremities mobile, no gross sensory deficits.   CHEST/LUNG: Clear to auscultation bilaterally; No rales, rhonchi, wheezing, or rubs  HEART: Regular rate and rhythm; No murmurs, rubs, or gallops  ABDOMEN: Soft, Nontender, Nondistended; Bowel sounds present  EXTREMITIES:  2+ Peripheral Pulses, No clubbing, cyanosis, or edema      LABS:                        11.0   12.58 )-----------( 198      ( 26 Mar 2024 07:49 )             33.1     26 Mar 2024 07:49    141    |  104    |  12     ----------------------------<  147    3.8     |  27     |  0.62     Ca    8.9        26 Mar 2024 07:49        Urinalysis Basic - ( 26 Mar 2024 07:49 )    Color: x / Appearance: x / SG: x / pH: x  Gluc: 147 mg/dL / Ketone: x  / Bili: x / Urobili: x   Blood: x / Protein: x / Nitrite: x   Leuk Esterase: x / RBC: x / WBC x   Sq Epi: x / Non Sq Epi: x / Bacteria: x      CAPILLARY BLOOD GLUCOSE          RADIOLOGY & ADDITIONAL TESTS:    Imaging Personally Reviewed:  [ ] YES     Consultant(s) Notes Reviewed:      Care Discussed with Consultants/Other Providers:    Advanced Directives: [ ] DNR  [ ] No feeding tube  [ ] MOLST in chart  [ ] MOLST completed today  [ ] Unknown

## 2024-03-26 NOTE — PHYSICAL THERAPY INITIAL EVALUATION ADULT - RISK REDUCTION/PREVENTION, PT EVAL
Checked INR today patient was 2.3 he has been taking 5 mg for the past 7 days.  [facility] seen results and advised patient to stay on 5 mg  Daily for the next 4 weeks and come back in 1 month to be checked risk factors

## 2024-03-26 NOTE — CARE COORDINATION ASSESSMENT. - NSPASTMEDSURGHISTORY_GEN_ALL_CORE_FT
PAST MEDICAL & SURGICAL HISTORY:  Hypertension      Arthritis      Knee pain      Status post total left knee replacement      S/P hip replacement, bilateral      Pancreatic insufficiency      Osteoarthritis      GERD (gastroesophageal reflux disease)      HLD (hyperlipidemia)      Chronic low back pain      S/P tonsillectomy      S/p bilateral carpal tunnel release      S/P  section      S/P total knee replacement, right

## 2024-03-27 LAB
ANION GAP SERPL CALC-SCNC: 10 MMOL/L — SIGNIFICANT CHANGE UP (ref 5–17)
BUN SERPL-MCNC: 18 MG/DL — SIGNIFICANT CHANGE UP (ref 7–23)
CALCIUM SERPL-MCNC: 9.5 MG/DL — SIGNIFICANT CHANGE UP (ref 8.4–10.5)
CHLORIDE SERPL-SCNC: 102 MMOL/L — SIGNIFICANT CHANGE UP (ref 96–108)
CO2 SERPL-SCNC: 27 MMOL/L — SIGNIFICANT CHANGE UP (ref 22–31)
CREAT SERPL-MCNC: 0.79 MG/DL — SIGNIFICANT CHANGE UP (ref 0.5–1.3)
EGFR: 80 ML/MIN/1.73M2 — SIGNIFICANT CHANGE UP
GLUCOSE SERPL-MCNC: 124 MG/DL — HIGH (ref 70–99)
POTASSIUM SERPL-MCNC: 3.9 MMOL/L — SIGNIFICANT CHANGE UP (ref 3.5–5.3)
POTASSIUM SERPL-SCNC: 3.9 MMOL/L — SIGNIFICANT CHANGE UP (ref 3.5–5.3)
SODIUM SERPL-SCNC: 139 MMOL/L — SIGNIFICANT CHANGE UP (ref 135–145)

## 2024-03-27 PROCEDURE — 99232 SBSQ HOSP IP/OBS MODERATE 35: CPT

## 2024-03-27 RX ADMIN — Medication 1000 MILLIGRAM(S): at 21:33

## 2024-03-27 RX ADMIN — Medication 1000 MILLIGRAM(S): at 14:16

## 2024-03-27 RX ADMIN — VALSARTAN 80 MILLIGRAM(S): 80 TABLET ORAL at 05:54

## 2024-03-27 RX ADMIN — HYDROMORPHONE HYDROCHLORIDE 4 MILLIGRAM(S): 2 INJECTION INTRAMUSCULAR; INTRAVENOUS; SUBCUTANEOUS at 15:38

## 2024-03-27 RX ADMIN — HYDROMORPHONE HYDROCHLORIDE 4 MILLIGRAM(S): 2 INJECTION INTRAMUSCULAR; INTRAVENOUS; SUBCUTANEOUS at 14:38

## 2024-03-27 RX ADMIN — Medication 1000 MILLIGRAM(S): at 17:32

## 2024-03-27 RX ADMIN — FAMOTIDINE 20 MILLIGRAM(S): 10 INJECTION INTRAVENOUS at 21:41

## 2024-03-27 RX ADMIN — PANTOPRAZOLE SODIUM 40 MILLIGRAM(S): 20 TABLET, DELAYED RELEASE ORAL at 05:54

## 2024-03-27 RX ADMIN — ATORVASTATIN CALCIUM 20 MILLIGRAM(S): 80 TABLET, FILM COATED ORAL at 21:33

## 2024-03-27 RX ADMIN — Medication 81 MILLIGRAM(S): at 17:33

## 2024-03-27 RX ADMIN — HYDROMORPHONE HYDROCHLORIDE 4 MILLIGRAM(S): 2 INJECTION INTRAMUSCULAR; INTRAVENOUS; SUBCUTANEOUS at 21:33

## 2024-03-27 RX ADMIN — Medication 1000 MILLIGRAM(S): at 21:39

## 2024-03-27 RX ADMIN — Medication 1000 MILLIGRAM(S): at 05:54

## 2024-03-27 RX ADMIN — HYDROMORPHONE HYDROCHLORIDE 4 MILLIGRAM(S): 2 INJECTION INTRAMUSCULAR; INTRAVENOUS; SUBCUTANEOUS at 11:30

## 2024-03-27 RX ADMIN — Medication 81 MILLIGRAM(S): at 05:54

## 2024-03-27 RX ADMIN — Medication 1000 MILLIGRAM(S): at 05:55

## 2024-03-27 RX ADMIN — HYDROMORPHONE HYDROCHLORIDE 4 MILLIGRAM(S): 2 INJECTION INTRAMUSCULAR; INTRAVENOUS; SUBCUTANEOUS at 22:15

## 2024-03-27 RX ADMIN — Medication 3 MILLIGRAM(S): at 21:32

## 2024-03-27 RX ADMIN — Medication 25 MILLIGRAM(S): at 05:54

## 2024-03-27 RX ADMIN — MELOXICAM 15 MILLIGRAM(S): 15 TABLET ORAL at 08:56

## 2024-03-27 RX ADMIN — Medication 1000 MILLIGRAM(S): at 05:58

## 2024-03-27 RX ADMIN — HYDROMORPHONE HYDROCHLORIDE 4 MILLIGRAM(S): 2 INJECTION INTRAMUSCULAR; INTRAVENOUS; SUBCUTANEOUS at 10:30

## 2024-03-27 RX ADMIN — SENNA PLUS 2 TABLET(S): 8.6 TABLET ORAL at 21:33

## 2024-03-27 NOTE — PROGRESS NOTE ADULT - SUBJECTIVE AND OBJECTIVE BOX
Subjective: Patient doing well and pain is adequately controlled.     MEDICATIONS  (STANDING):  acetaminophen     Tablet .. 1000 milliGRAM(s) Oral every 8 hours  aspirin enteric coated 81 milliGRAM(s) Oral every 12 hours  atorvastatin 20 milliGRAM(s) Oral at bedtime  famotidine    Tablet 20 milliGRAM(s) Oral at bedtime  lactated ringers. 1000 milliLiter(s) (100 mL/Hr) IV Continuous <Continuous>  melatonin 3 milliGRAM(s) Oral at bedtime  meloxicam 15 milliGRAM(s) Oral daily  metoprolol succinate ER 25 milliGRAM(s) Oral daily  pantoprazole    Tablet 40 milliGRAM(s) Oral before breakfast  polyethylene glycol 3350 17 Gram(s) Oral at bedtime  senna 2 Tablet(s) Oral at bedtime  sulfaSALAzine 1000 milliGRAM(s) Oral two times a day  valsartan 80 milliGRAM(s) Oral daily    MEDICATIONS  (PRN):  ALPRAZolam 0.5 milliGRAM(s) Oral daily PRN for anxiety  aluminum hydroxide/magnesium hydroxide/simethicone Suspension 30 milliLiter(s) Oral four times a day PRN Indigestion  bisacodyl Suppository 10 milliGRAM(s) Rectal once PRN Constipation  HYDROmorphone   Tablet 2 milliGRAM(s) Oral every 4 hours PRN Moderate Pain (4 - 6)  HYDROmorphone   Tablet 4 milliGRAM(s) Oral every 4 hours PRN Severe Pain (7 - 10)  HYDROmorphone  Injectable 0.5 milliGRAM(s) IV Push every 3 hours PRN Breakthrough pain  magnesium hydroxide Suspension 30 milliLiter(s) Oral daily PRN Constipation  ondansetron Injectable 4 milliGRAM(s) IV Push every 6 hours PRN Nausea and/or Vomiting      Allergies    No Known Allergies    Intolerances        Vital Signs Last 24 Hrs  T(C): 36.5 (27 Mar 2024 07:32), Max: 36.9 (26 Mar 2024 15:39)  T(F): 97.7 (27 Mar 2024 07:32), Max: 98.4 (26 Mar 2024 15:39)  HR: 62 (27 Mar 2024 07:32) (62 - 73)  BP: 119/69 (27 Mar 2024 07:32) (96/58 - 119/69)  BP(mean): 70 (26 Mar 2024 15:39) (70 - 70)  RR: 17 (27 Mar 2024 07:32) (16 - 17)  SpO2: 93% (27 Mar 2024 07:32) (92% - 96%)    Parameters below as of 27 Mar 2024 07:32  Patient On (Oxygen Delivery Method): room air        PHYSICAL EXAM:  GENERAL: NAD, well-groomed, well-developed  HEAD:  Atraumatic, Normocephalic  ENMT: Moist mucous membranes,   NECK: Supple, No JVD, Normal thyroid  NERVOUS SYSTEM:  All 4 extremities mobile, no gross sensory deficits.   CHEST/LUNG: Clear to auscultation bilaterally; No rales, rhonchi, wheezing, or rubs  HEART: Regular rate and rhythm; No murmurs, rubs, or gallops  ABDOMEN: Soft, Nontender, Nondistended; Bowel sounds present  EXTREMITIES:  2+ Peripheral Pulses, No clubbing, cyanosis, or edema      LABS:      Ca    8.9        26 Mar 2024 07:49        Urinalysis Basic - ( 26 Mar 2024 07:49 )    Color: x / Appearance: x / SG: x / pH: x  Gluc: 147 mg/dL / Ketone: x  / Bili: x / Urobili: x   Blood: x / Protein: x / Nitrite: x   Leuk Esterase: x / RBC: x / WBC x   Sq Epi: x / Non Sq Epi: x / Bacteria: x      CAPILLARY BLOOD GLUCOSE          RADIOLOGY & ADDITIONAL TESTS:    Imaging Personally Reviewed:  [ ] YES     Consultant(s) Notes Reviewed:      Care Discussed with Consultants/Other Providers:    Advanced Directives: [ ] DNR  [ ] No feeding tube  [ ] MOLST in chart  [ ] MOLST completed today  [ ] Unknown

## 2024-03-27 NOTE — CASE MANAGEMENT PROGRESS NOTE - NSCMPROGRESSNOTE_GEN_ALL_CORE
TRANSITION OF CARE PLAN UPDATE:  RN FALGUNI reviewed discharge planning with patient and son, Yan at bedside, patient declined [facility] recommendation by PT; family / Yan will assume care for patient; DC with [facility]; accepted; Discussed; explained IMM with patient and Yan, all parties verbalized understanding; anticipated DC 03/27/2024;

## 2024-03-27 NOTE — PROGRESS NOTE ADULT - ASSESSMENT
68F HTN, HLD, GERD and OA admitted for aftercare following Right THR    S/P Right THR POD 2  Continue Bowel and pain control regimen;    Incentive Spirometer for lung expansion;   Monitor Hgb and follow up electrolytes.      HTN/HLD  Metoprolol + Statin  Hold ARB and HCTZ    GERD  Protonix    Anxiety  Xanax PRN    Diet  Regular    DVT Prophylaxis   Aspirin BID    Disposition  Patient medically optimized for discharge home vs [facility] based on PT evaluation.

## 2024-03-27 NOTE — PROGRESS NOTE ADULT - SUBJECTIVE AND OBJECTIVE BOX
POST OPERATIVE DAY #: 2    68y Female  s/p  Revision Right Posterior THR                        SUBJECTIVE: Patient seen and examined at bedside.     Pain:  well controlled     Pain scale:   2/10  Denies CP, SOB, N/V/D, weakness, numbness   No new complains     OBJECTIVE:     Vital Signs Last 24 Hrs  T(C): 36.5 (27 Mar 2024 07:32), Max: 36.9 (26 Mar 2024 15:39)  T(F): 97.7 (27 Mar 2024 07:32), Max: 98.4 (26 Mar 2024 15:39)  HR: 62 (27 Mar 2024 07:32) (62 - 73)  BP: 119/69 (27 Mar 2024 07:32) (96/58 - 119/69)  BP(mean): 70 (26 Mar 2024 15:39) (70 - 70)  RR: 17 (27 Mar 2024 07:32) (16 - 17)  SpO2: 93% (27 Mar 2024 07:32) (92% - 96%)    Parameters below as of 27 Mar 2024 07:32  Patient On (Oxygen Delivery Method): room air        Affected extremity: RLE/ LLE         Dressing: adilson, silverlon, meliplex  clean/dry/intact                             Sensation: intact to light touch          Motor exam:   / 5 Tibialis Anterior/Gastrocnemius-Soleus, EHL/FHL         warm, well-perfused; capillary refill < 3 seconds         negative calf tenderness B/L LE    LABS:                        11.0   12.58 )-----------( 198      ( 26 Mar 2024 07:49 )             33.1     03-26    141  |  104  |  12  ----------------------------<  147<H>  3.8   |  27  |  0.62    Ca    8.9      26 Mar 2024 07:49        I&O's Detail      MEDICATIONS:    Pain management:  acetaminophen     Tablet .. 1000 milliGRAM(s) Oral every 8 hours  ALPRAZolam 0.5 milliGRAM(s) Oral daily PRN  HYDROmorphone   Tablet 2 milliGRAM(s) Oral every 4 hours PRN  HYDROmorphone   Tablet 4 milliGRAM(s) Oral every 4 hours PRN  HYDROmorphone  Injectable 0.5 milliGRAM(s) IV Push every 3 hours PRN  melatonin 3 milliGRAM(s) Oral at bedtime  meloxicam 15 milliGRAM(s) Oral daily  ondansetron Injectable 4 milliGRAM(s) IV Push every 6 hours PRN    DVT prophylaxis:   aspirin enteric coated 81 milliGRAM(s) Oral every 12 hours      RADIOLOGY & ADDITIONAL STUDIES:    ASSESSMENT AND PLAN:     - Analgesic pain control  - DVT prophylaxis: ASA 81 mg twice a day     SCDs       - HO prophylaxis/Pain Management: Meloxicam 15mg once a day  - PT/OT: Weight Bearing Status:  Weight bearing as tolerated, OOBTC           Posteiror Hip precautions   Abduction pillow   -  Incentive spirometry  - ice  - Advance diet as tolerated  - Hospitalist is following  -  Follow up labs  -  Disposition: Home     vs     Subacute Rehab  POST OPERATIVE DAY #: 2    68y Female  s/p  Revision Right Posterior THR                        SUBJECTIVE: Patient seen and examined at bedside.     Pain:  well controlled     Pain scale:   2/10  Denies CP, SOB, N/V/D, weakness, numbness   No new complains     OBJECTIVE:     Vital Signs Last 24 Hrs  T(C): 36.5 (27 Mar 2024 07:32), Max: 36.9 (26 Mar 2024 15:39)  T(F): 97.7 (27 Mar 2024 07:32), Max: 98.4 (26 Mar 2024 15:39)  HR: 62 (27 Mar 2024 07:32) (62 - 73)  BP: 119/69 (27 Mar 2024 07:32) (96/58 - 119/69)  BP(mean): 70 (26 Mar 2024 15:39) (70 - 70)  RR: 17 (27 Mar 2024 07:32) (16 - 17)  SpO2: 93% (27 Mar 2024 07:32) (92% - 96%)    Parameters below as of 27 Mar 2024 07:32  Patient On (Oxygen Delivery Method): room air        Affected extremity: RLE         Dressing: adilson, clean/dry/intact                             Sensation: intact to light touch          Motor exam:  5 / 5 Tibialis Anterior/Gastrocnemius-Soleus, EHL/FHL         warm, well-perfused; capillary refill < 3 seconds         negative calf tenderness B/L LE    LABS:                        11.0   12.58 )-----------( 198      ( 26 Mar 2024 07:49 )             33.1     03-26    141  |  104  |  12  ----------------------------<  147<H>  3.8   |  27  |  0.62    Ca    8.9      26 Mar 2024 07:49        I&O's Detail      MEDICATIONS:    Pain management:  acetaminophen     Tablet .. 1000 milliGRAM(s) Oral every 8 hours  ALPRAZolam 0.5 milliGRAM(s) Oral daily PRN  HYDROmorphone   Tablet 2 milliGRAM(s) Oral every 4 hours PRN  HYDROmorphone   Tablet 4 milliGRAM(s) Oral every 4 hours PRN  HYDROmorphone  Injectable 0.5 milliGRAM(s) IV Push every 3 hours PRN  melatonin 3 milliGRAM(s) Oral at bedtime  meloxicam 15 milliGRAM(s) Oral daily  ondansetron Injectable 4 milliGRAM(s) IV Push every 6 hours PRN    DVT prophylaxis:   aspirin enteric coated 81 milliGRAM(s) Oral every 12 hours      RADIOLOGY & ADDITIONAL STUDIES:    ASSESSMENT AND PLAN:     - Analgesic pain control  - DVT prophylaxis: ASA 81 mg twice a day     SCDs       - HO prophylaxis/Pain Management: Meloxicam 15mg once a day  - PT/OT: Weight Bearing Status:  Weight bearing as tolerated, OOBTC           Posteiror Hip precautions   Abduction pillow   -  Incentive spirometry  - ice  - Advance diet as tolerated  - Hospitalist is following  -  Follow up labs  -  Disposition: Home     vs     Subacute Rehab

## 2024-03-27 NOTE — PROGRESS NOTE ADULT - SUBJECTIVE AND OBJECTIVE BOX
Discharge medication calendar:   ASA EC 81mg q12h x 4 weeks  Omeprazole 20mg QAM x 4 weeks  Meloxicam 15mg QAM x 3 weeks   APAP 1000mg q8h x 2-3 weeks  Narcotic PRN  Docusate 100mg TID while taking narcotic  Miralax, Senna, or Bisacodyl PRN for treatment of constipation   Discharge medication calendar:   ASA EC 81mg q12h x 4 weeks  Omeprazole 20mg QAM x 4 weeks  Meloxicam 15mg QAM x 3 weeks   APAP 1000mg q8h x 2-3 weeks  Narcotic PRN  Docusate 100mg TID while taking narcotic  Miralax, Senna, or Bisacodyl PRN for treatment of constipation  Narcan

## 2024-03-27 NOTE — PHARMACOTHERAPY INTERVENTION NOTE - COMMENTS
Admission medication reconciliation POD1 
Admission medication reconciliation POD1 
Transition of Care video discharge education - medication calendar given to patient

## 2024-03-28 VITALS
SYSTOLIC BLOOD PRESSURE: 111 MMHG | OXYGEN SATURATION: 94 % | RESPIRATION RATE: 16 BRPM | HEART RATE: 65 BPM | TEMPERATURE: 98 F | DIASTOLIC BLOOD PRESSURE: 52 MMHG

## 2024-03-28 LAB
ANION GAP SERPL CALC-SCNC: 11 MMOL/L — SIGNIFICANT CHANGE UP (ref 5–17)
BUN SERPL-MCNC: 17 MG/DL — SIGNIFICANT CHANGE UP (ref 7–23)
CALCIUM SERPL-MCNC: 9.5 MG/DL — SIGNIFICANT CHANGE UP (ref 8.4–10.5)
CHLORIDE SERPL-SCNC: 102 MMOL/L — SIGNIFICANT CHANGE UP (ref 96–108)
CO2 SERPL-SCNC: 28 MMOL/L — SIGNIFICANT CHANGE UP (ref 22–31)
CREAT SERPL-MCNC: 0.71 MG/DL — SIGNIFICANT CHANGE UP (ref 0.5–1.3)
EGFR: 91 ML/MIN/1.73M2 — SIGNIFICANT CHANGE UP
GLUCOSE SERPL-MCNC: 114 MG/DL — HIGH (ref 70–99)
HCT VFR BLD CALC: 33.3 % — LOW (ref 34.5–45)
HGB BLD-MCNC: 10.8 G/DL — LOW (ref 11.5–15.5)
MCHC RBC-ENTMCNC: 29.9 PG — SIGNIFICANT CHANGE UP (ref 27–34)
MCHC RBC-ENTMCNC: 32.4 GM/DL — SIGNIFICANT CHANGE UP (ref 32–36)
MCV RBC AUTO: 92.2 FL — SIGNIFICANT CHANGE UP (ref 80–100)
NRBC # BLD: 0 /100 WBCS — SIGNIFICANT CHANGE UP (ref 0–0)
PLATELET # BLD AUTO: 210 K/UL — SIGNIFICANT CHANGE UP (ref 150–400)
POTASSIUM SERPL-MCNC: 3.8 MMOL/L — SIGNIFICANT CHANGE UP (ref 3.5–5.3)
POTASSIUM SERPL-SCNC: 3.8 MMOL/L — SIGNIFICANT CHANGE UP (ref 3.5–5.3)
RBC # BLD: 3.61 M/UL — LOW (ref 3.8–5.2)
RBC # FLD: 13.8 % — SIGNIFICANT CHANGE UP (ref 10.3–14.5)
SODIUM SERPL-SCNC: 141 MMOL/L — SIGNIFICANT CHANGE UP (ref 135–145)
WBC # BLD: 11.75 K/UL — HIGH (ref 3.8–10.5)
WBC # FLD AUTO: 11.75 K/UL — HIGH (ref 3.8–10.5)

## 2024-03-28 PROCEDURE — 86850 RBC ANTIBODY SCREEN: CPT

## 2024-03-28 PROCEDURE — 97165 OT EVAL LOW COMPLEX 30 MIN: CPT

## 2024-03-28 PROCEDURE — C1776: CPT

## 2024-03-28 PROCEDURE — 97530 THERAPEUTIC ACTIVITIES: CPT

## 2024-03-28 PROCEDURE — 73502 X-RAY EXAM HIP UNI 2-3 VIEWS: CPT

## 2024-03-28 PROCEDURE — 87205 SMEAR GRAM STAIN: CPT

## 2024-03-28 PROCEDURE — C9399: CPT

## 2024-03-28 PROCEDURE — 87070 CULTURE OTHR SPECIMN AEROBIC: CPT

## 2024-03-28 PROCEDURE — C1713: CPT

## 2024-03-28 PROCEDURE — 86880 COOMBS TEST DIRECT: CPT

## 2024-03-28 PROCEDURE — 86901 BLOOD TYPING SEROLOGIC RH(D): CPT

## 2024-03-28 PROCEDURE — 88300 SURGICAL PATH GROSS: CPT

## 2024-03-28 PROCEDURE — 85027 COMPLETE CBC AUTOMATED: CPT

## 2024-03-28 PROCEDURE — 87075 CULTR BACTERIA EXCEPT BLOOD: CPT

## 2024-03-28 PROCEDURE — C1889: CPT

## 2024-03-28 PROCEDURE — 97110 THERAPEUTIC EXERCISES: CPT

## 2024-03-28 PROCEDURE — 93970 EXTREMITY STUDY: CPT

## 2024-03-28 PROCEDURE — 97116 GAIT TRAINING THERAPY: CPT

## 2024-03-28 PROCEDURE — 97535 SELF CARE MNGMENT TRAINING: CPT

## 2024-03-28 PROCEDURE — 86900 BLOOD TYPING SEROLOGIC ABO: CPT

## 2024-03-28 PROCEDURE — 97161 PT EVAL LOW COMPLEX 20 MIN: CPT

## 2024-03-28 PROCEDURE — 36415 COLL VENOUS BLD VENIPUNCTURE: CPT

## 2024-03-28 PROCEDURE — 80048 BASIC METABOLIC PNL TOTAL CA: CPT

## 2024-03-28 RX ADMIN — HYDROMORPHONE HYDROCHLORIDE 4 MILLIGRAM(S): 2 INJECTION INTRAMUSCULAR; INTRAVENOUS; SUBCUTANEOUS at 13:30

## 2024-03-28 RX ADMIN — VALSARTAN 80 MILLIGRAM(S): 80 TABLET ORAL at 06:25

## 2024-03-28 RX ADMIN — HYDROMORPHONE HYDROCHLORIDE 4 MILLIGRAM(S): 2 INJECTION INTRAMUSCULAR; INTRAVENOUS; SUBCUTANEOUS at 06:22

## 2024-03-28 RX ADMIN — Medication 1000 MILLIGRAM(S): at 06:26

## 2024-03-28 RX ADMIN — Medication 1000 MILLIGRAM(S): at 06:25

## 2024-03-28 RX ADMIN — Medication 81 MILLIGRAM(S): at 06:23

## 2024-03-28 RX ADMIN — MELOXICAM 15 MILLIGRAM(S): 15 TABLET ORAL at 08:11

## 2024-03-28 RX ADMIN — PANTOPRAZOLE SODIUM 40 MILLIGRAM(S): 20 TABLET, DELAYED RELEASE ORAL at 06:23

## 2024-03-28 RX ADMIN — MELOXICAM 15 MILLIGRAM(S): 15 TABLET ORAL at 08:10

## 2024-03-28 RX ADMIN — HYDROMORPHONE HYDROCHLORIDE 4 MILLIGRAM(S): 2 INJECTION INTRAMUSCULAR; INTRAVENOUS; SUBCUTANEOUS at 07:15

## 2024-03-28 RX ADMIN — HYDROMORPHONE HYDROCHLORIDE 4 MILLIGRAM(S): 2 INJECTION INTRAMUSCULAR; INTRAVENOUS; SUBCUTANEOUS at 12:55

## 2024-03-28 RX ADMIN — Medication 25 MILLIGRAM(S): at 06:24

## 2024-03-28 RX ADMIN — Medication 1000 MILLIGRAM(S): at 06:22

## 2024-03-28 NOTE — PROGRESS NOTE ADULT - SUBJECTIVE AND OBJECTIVE BOX
Procedure: Right  Posterior Revision THR  POD#: 3    SUBJECTIVE:   Pt without complaints. No SOB,CP, N/V. Tolerated Fluids / Diet well.  Hip  Pain comfortable (3/10 ) on  Interval Rx.; Voiding well;  No BM yet, + flatus, No abdominal pain.  Pain Rx:  acetaminophen     Tablet .. 1000 milliGRAM(s) Oral every 8 hours  ALPRAZolam 0.5 milliGRAM(s) Oral daily PRN  HYDROmorphone   Tablet 2 milliGRAM(s) Oral every 4 hours PRN  HYDROmorphone   Tablet 4 milliGRAM(s) Oral every 4 hours PRN  HYDROmorphone  Injectable 0.5 milliGRAM(s) IV Push every 3 hours PRN  melatonin 3 milliGRAM(s) Oral at bedtime  meloxicam 15 milliGRAM(s) Oral daily  ondansetron Injectable 4 milliGRAM(s) IV Push every 6 hours PRN      OBJECTIVE:   General: Pt Alert and oriented, On exam NAD,   VS: Vital Signs Last 24 Hrs  T(C): 36.6 (28 Mar 2024 08:06), Max: 36.8 (27 Mar 2024 23:14)  T(F): 97.8 (28 Mar 2024 08:06), Max: 98.3 (27 Mar 2024 23:14)  HR: 65 (28 Mar 2024 08:06) (65 - 74)  BP: 111/52 (28 Mar 2024 08:06) (98/63 - 126/62)  RR: 16 (28 Mar 2024 08:06) (16 - 18)  SpO2: 94% (28 Mar 2024 08:06) (93% - 94%)    [Abdomen]: Soft; Protuberant; no distention, benign exam  Ext( Right  Post. Hip): ROLY  Dressing; clean, dry, & intact, suction intact; ; Min. / Mod. STS buttock/thigh; No  cellulitis;  [ No fluctuance, No crepitus]  Neurologic: Has sensation bilat. feet & toes ;  Full AROM bilat feet & toes. EHL / AT  = Bilat: 5/5   Vascular: Feet toes warm, pink. DP = 2+. Calves soft ; w/o tenderness bilat..  HVAC = N/A ml Overnight ;     VTEP: On Bilat. Venodynes + aspirin enteric coated 81 milliGRAM(s) Oral every 12 hours     H.O Prophylaxis: Meloxicam 15mg QD x Goal 21 Days    Activity in PT yesterday Noted.    Labs Today:  CBC:                      10.8   11.75 )-----------( 210      ( 28 Mar 2024 06:00 )             33.3     03-28  CHem:  141  |  102  |  17  ----------------------------<  114<H>  3.8   |  28  |  0.71    Ca    9.5      28 Mar 2024 06:00    Hospitalist input yest. noted    Primary Orthopedic Assessment:  • Stable from Orthopedic perspective  • Neuro motor exam stable  • Labs: CBC, Chem stable      Plan:   • Continue:  PT/OT/Weightbearing as tolerated with assistance of a walker/Posterior THR precautions/Ice to hip/          Incentive spirometry encouraged / Mobic for H.O  • Continue DVT prophylaxis as prescribed, including use of compression devices and ankle pumps  • Continue Pain Rx  • Posterior THR hip precautions reviewed with patient  • Plans per Medicine   • Discharge planning – anticipated discharge is Home D/C with Home Care & home PT versus subacute rehabilitation when medically stable & cleared by PT/OT

## 2024-03-28 NOTE — CASE MANAGEMENT PROGRESS NOTE - NSCMPROGRESSNOTE_GEN_ALL_CORE
FINAL DISCHARGE NOTE:  DC to home with [facility]; accepted; son dontae will assume care; and transport to home; Staff RN aware.

## 2024-04-09 ENCOUNTER — APPOINTMENT (OUTPATIENT)
Dept: ORTHOPEDIC SURGERY | Facility: CLINIC | Age: 69
End: 2024-04-09
Payer: MEDICARE

## 2024-04-09 VITALS
HEIGHT: 61 IN | WEIGHT: 245 LBS | DIASTOLIC BLOOD PRESSURE: 74 MMHG | HEART RATE: 96 BPM | BODY MASS INDEX: 46.26 KG/M2 | SYSTOLIC BLOOD PRESSURE: 144 MMHG

## 2024-04-09 LAB
CULTURE RESULTS: SIGNIFICANT CHANGE UP
CULTURE RESULTS: SIGNIFICANT CHANGE UP
SPECIMEN SOURCE: SIGNIFICANT CHANGE UP
SPECIMEN SOURCE: SIGNIFICANT CHANGE UP

## 2024-04-09 PROCEDURE — 99024 POSTOP FOLLOW-UP VISIT: CPT

## 2024-04-09 PROCEDURE — 73502 X-RAY EXAM HIP UNI 2-3 VIEWS: CPT | Mod: RT

## 2024-04-09 RX ORDER — HYDROMORPHONE HYDROCHLORIDE 2 MG/1
2 TABLET ORAL
Qty: 60 | Refills: 0 | Status: ACTIVE | COMMUNITY
Start: 2024-04-09 | End: 1900-01-01

## 2024-04-09 NOTE — HISTORY OF PRESENT ILLNESS
[___ Weeks Post Op] : [unfilled] weeks post op [Chills] : no chills [Fever] : no fever [Swelling] : swollen [Xray (Date:___)] : [unfilled] Xray -  [Hardware in Good Position] : hardware in good position [No Obvious Fractures] : no obvious fractures [Good Overall Alignment] : good overall alignment [Doing Well] : is doing well [No Sign of Infection] : is showing no signs of infection [Adequate Pain Control] : has adequate pain control [de-identified] : Revision right total hip replacement 3/25/2024 [de-identified] : The patient is a 68-year-old female who presents today for follow-up of her right hip.  She status post a right posterior revision total hip replacement for polyethylene wear from a surgery done many years ago by another surgeon.  Patient has home physical therapy.  She is weightbearing as tolerated.  She is taking Dilaudid on appearing basis for the discomfort.  She is ambulating with a rolling walker. [de-identified] : The surgical tape was removed from her posterior hip without difficulty.  There is no erythema, drainage, or evidence of cellulitis.  Her calves are soft, nontender, no evidence of DVT at this time.  Patient is good motor and sensory to both legs.  Range of motion acceptable status post 2 weeks hip replacement [de-identified] : Revision right total hip replacement, in anatomical alignment, excellent bone to prosthesis interface, there is no gross evidence of prosthetic failure, all the components are perfectly anatomically aligned. [de-identified] : Stable postoperative course of revision right total hip replacement [de-identified] : The patient will continue an exercise program of walking, strength training.  She was given a prescription for Dilaudid.  A prescription for continued home physical therapy.  Would like her to follow her total hip replacement protocol precautions 6 weeks from the date of surgery.  She will follow-up in several weeks.  All of her questions were answered to her satisfaction.

## 2024-04-10 LAB
CULTURE RESULTS: SIGNIFICANT CHANGE UP
SPECIMEN SOURCE: SIGNIFICANT CHANGE UP

## 2024-05-07 ENCOUNTER — APPOINTMENT (OUTPATIENT)
Dept: ORTHOPEDIC SURGERY | Facility: CLINIC | Age: 69
End: 2024-05-07
Payer: MEDICARE

## 2024-05-07 PROCEDURE — 99024 POSTOP FOLLOW-UP VISIT: CPT

## 2024-05-07 PROCEDURE — 73502 X-RAY EXAM HIP UNI 2-3 VIEWS: CPT | Mod: 26,55

## 2024-05-08 NOTE — END OF VISIT
[FreeTextEntry3] : I, Dr. Franklin Ibarra MD, personally performed the evaluation and management services for this established patient who presented today with a new problem/exacerbation of an existing condition. That E/M includes conducting the examination, assessing all new/exacerbated conditions, and establishing a new plan of care. Today, MY ACP was here to assist with recording the history in my evaluation and management services of this new problem/exacerbated condition to be followed going forward.

## 2024-05-08 NOTE — HISTORY OF PRESENT ILLNESS
[___ Weeks Post Op] : [unfilled] weeks post op [2] : the patient reports pain that is 2/10 in severity [Clean/Dry/Intact] : clean, dry and intact [Healed] : healed [Neuro Intact] : an unremarkable neurological exam [Vascular Intact] : ~T peripheral vascular exam normal [Negative Jaci's] : maneuvers demonstrated a negative Jaci's sign [Xray (Date:___)] : [unfilled] Xray -  [Hardware in Good Position] : hardware in good position [No Obvious Fractures] : no obvious fractures [Good Overall Alignment] : good overall alignment [Doing Well] : is doing well [Excellent Pain Control] : has excellent pain control [No Sign of Infection] : is showing no signs of infection [None] : None [Chills] : no chills [Fever] : no fever [Erythema] : not erythematous [Swelling] : not swollen [de-identified] : s/p rev right THR 3/25/24 [de-identified] : Patient here for her 6 week post operative check of the revised right total hip replacement. Patient progressing well and continue to be careful with her posterior hip precautions, patient states of continued stiffness and continue to utilize a walker. [de-identified] : Surgical wound was observed with a one small spot of a scab with no noted drainage, patient denies of any drainage. [de-identified] : X-rays 2 views, the pelvis AP and lateral revision of right total hip are performed today.  There are stable interfaces between the bone and implant of the femur and acetabulum.  There is stable positioning of the femoral and acetabular components, good alignment of the femoral head to acetabulum.  There is no obvious fracture seen. [de-identified] : Patient to continue with physical therapy.  Hip precautions are cautioned but they are lifted and allowed to drive.

## 2024-05-15 ENCOUNTER — APPOINTMENT (OUTPATIENT)
Dept: INTERNAL MEDICINE | Facility: CLINIC | Age: 69
End: 2024-05-15
Payer: MEDICARE

## 2024-05-15 VITALS
SYSTOLIC BLOOD PRESSURE: 112 MMHG | OXYGEN SATURATION: 98 % | DIASTOLIC BLOOD PRESSURE: 60 MMHG | HEIGHT: 61 IN | BODY MASS INDEX: 46.26 KG/M2 | WEIGHT: 245 LBS | HEART RATE: 104 BPM | RESPIRATION RATE: 18 BRPM | TEMPERATURE: 98.5 F

## 2024-05-15 VITALS — DIASTOLIC BLOOD PRESSURE: 70 MMHG | SYSTOLIC BLOOD PRESSURE: 120 MMHG

## 2024-05-15 PROCEDURE — 99214 OFFICE O/P EST MOD 30 MIN: CPT

## 2024-05-15 PROCEDURE — G2211 COMPLEX E/M VISIT ADD ON: CPT

## 2024-05-15 RX ORDER — HYDROCHLOROTHIAZIDE 12.5 MG/1
12.5 TABLET ORAL DAILY
Qty: 90 | Refills: 1 | Status: ACTIVE | COMMUNITY
Start: 2024-05-15 | End: 1900-01-01

## 2024-05-15 NOTE — ASSESSMENT
[FreeTextEntry1] : HTN with leg edema- add HCT 12.5mg daily. Call if worse. Keep legs elevated. Continue valsartan 80/ 12.5mg daily Keeps legs elevated low salt diet Hyperlipidemia- continue Lipitor 20mg daily

## 2024-06-04 ENCOUNTER — APPOINTMENT (OUTPATIENT)
Dept: INTERNAL MEDICINE | Facility: CLINIC | Age: 69
End: 2024-06-04
Payer: MEDICARE

## 2024-06-04 VITALS
TEMPERATURE: 98.5 F | DIASTOLIC BLOOD PRESSURE: 70 MMHG | RESPIRATION RATE: 16 BRPM | HEIGHT: 61 IN | SYSTOLIC BLOOD PRESSURE: 136 MMHG | BODY MASS INDEX: 46.26 KG/M2 | HEART RATE: 77 BPM | OXYGEN SATURATION: 97 % | WEIGHT: 245 LBS

## 2024-06-04 DIAGNOSIS — Z00.00 ENCOUNTER FOR GENERAL ADULT MEDICAL EXAMINATION W/OUT ABNORMAL FINDINGS: ICD-10-CM

## 2024-06-04 PROCEDURE — G0439: CPT

## 2024-06-04 RX ORDER — ATORVASTATIN CALCIUM 40 MG/1
40 TABLET, FILM COATED ORAL
Refills: 0 | Status: ACTIVE | COMMUNITY
Start: 2023-01-20

## 2024-06-04 NOTE — ASSESSMENT
[FreeTextEntry1] : HTN- HCT 12.5mg plus Valsartan 80/12.5 and metoprolol ER 25mg daily- follow up with [facility] for weight loss management.  Check labs today

## 2024-06-04 NOTE — HEALTH RISK ASSESSMENT
[Good] : ~his/her~  mood as  good [No falls in past year] : Patient reported no falls in the past year [0] : 2) Feeling down, depressed, or hopeless: Not at all (0) [[id number]] : 0 [Patient reported mammogram was normal] : Patient reported mammogram was normal [Patient reported colonoscopy was normal] : Patient reported colonoscopy was normal [MammogramDate] : 06/23 [MammogramComments] : appt [ColonoscopyDate] : 2019

## 2024-06-05 LAB
25(OH)D3 SERPL-MCNC: 21.5 NG/ML
ALBUMIN SERPL ELPH-MCNC: 4.7 G/DL
ALP BLD-CCNC: 80 U/L
ALT SERPL-CCNC: 12 U/L
ANION GAP SERPL CALC-SCNC: 15 MMOL/L
APPEARANCE: CLEAR
AST SERPL-CCNC: 18 U/L
BACTERIA: NEGATIVE /HPF
BASOPHILS # BLD AUTO: 0.04 K/UL
BASOPHILS NFR BLD AUTO: 0.5 %
BILIRUB SERPL-MCNC: 0.8 MG/DL
BILIRUBIN URINE: NEGATIVE
BLOOD URINE: NEGATIVE
BUN SERPL-MCNC: 22 MG/DL
CALCIUM SERPL-MCNC: 10.1 MG/DL
CAST: 0 /LPF
CHLORIDE SERPL-SCNC: 100 MMOL/L
CHOLEST SERPL-MCNC: 133 MG/DL
CO2 SERPL-SCNC: 26 MMOL/L
COLOR: YELLOW
CREAT SERPL-MCNC: 0.7 MG/DL
EGFR: 92 ML/MIN/1.73M2
EOSINOPHIL # BLD AUTO: 0.14 K/UL
EOSINOPHIL NFR BLD AUTO: 1.9 %
EPITHELIAL CELLS: 2 /HPF
ESTIMATED AVERAGE GLUCOSE: 117 MG/DL
FOLATE SERPL-MCNC: 10.8 NG/ML
GLUCOSE QUALITATIVE U: NEGATIVE MG/DL
GLUCOSE SERPL-MCNC: 120 MG/DL
HBA1C MFR BLD HPLC: 5.7 %
HCT VFR BLD CALC: 44.5 %
HDLC SERPL-MCNC: 39 MG/DL
HGB BLD-MCNC: 13.6 G/DL
IMM GRANULOCYTES NFR BLD AUTO: 0.5 %
KETONES URINE: NEGATIVE MG/DL
LDLC SERPL CALC-MCNC: 66 MG/DL
LEUKOCYTE ESTERASE URINE: NEGATIVE
LYMPHOCYTES # BLD AUTO: 2.01 K/UL
LYMPHOCYTES NFR BLD AUTO: 27.4 %
MAN DIFF?: NORMAL
MCHC RBC-ENTMCNC: 27.5 PG
MCHC RBC-ENTMCNC: 30.6 GM/DL
MCV RBC AUTO: 89.9 FL
MICROSCOPIC-UA: NORMAL
MONOCYTES # BLD AUTO: 0.66 K/UL
MONOCYTES NFR BLD AUTO: 9 %
NEUTROPHILS # BLD AUTO: 4.45 K/UL
NEUTROPHILS NFR BLD AUTO: 60.7 %
NITRITE URINE: NEGATIVE
NONHDLC SERPL-MCNC: 94 MG/DL
PH URINE: 6
PLATELET # BLD AUTO: 207 K/UL
POTASSIUM SERPL-SCNC: 4 MMOL/L
PROT SERPL-MCNC: 7.5 G/DL
PROTEIN URINE: NEGATIVE MG/DL
RBC # BLD: 4.95 M/UL
RBC # FLD: 14.9 %
RED BLOOD CELLS URINE: 2 /HPF
REVIEW: NORMAL
SODIUM SERPL-SCNC: 140 MMOL/L
SPECIFIC GRAVITY URINE: 1.02
TRIGL SERPL-MCNC: 162 MG/DL
TSH SERPL-ACNC: 2.16 UIU/ML
UROBILINOGEN URINE: 0.2 MG/DL
VIT B12 SERPL-MCNC: 473 PG/ML
WBC # FLD AUTO: 7.34 K/UL
WHITE BLOOD CELLS URINE: 0 /HPF

## 2024-06-06 ENCOUNTER — APPOINTMENT (OUTPATIENT)
Dept: BARIATRICS | Facility: CLINIC | Age: 69
End: 2024-06-06
Payer: MEDICARE

## 2024-06-06 VITALS
OXYGEN SATURATION: 97 % | WEIGHT: 251.4 LBS | DIASTOLIC BLOOD PRESSURE: 70 MMHG | SYSTOLIC BLOOD PRESSURE: 114 MMHG | BODY MASS INDEX: 49.36 KG/M2 | HEART RATE: 60 BPM | HEIGHT: 60 IN | TEMPERATURE: 97.5 F

## 2024-06-06 DIAGNOSIS — E78.5 HYPERLIPIDEMIA, UNSPECIFIED: ICD-10-CM

## 2024-06-06 DIAGNOSIS — E66.01 MORBID (SEVERE) OBESITY DUE TO EXCESS CALORIES: ICD-10-CM

## 2024-06-06 DIAGNOSIS — E46 UNSPECIFIED PROTEIN-CALORIE MALNUTRITION: ICD-10-CM

## 2024-06-06 DIAGNOSIS — R63.5 ABNORMAL WEIGHT GAIN: ICD-10-CM

## 2024-06-06 DIAGNOSIS — R63.2 POLYPHAGIA: ICD-10-CM

## 2024-06-06 DIAGNOSIS — I10 ESSENTIAL (PRIMARY) HYPERTENSION: ICD-10-CM

## 2024-06-06 DIAGNOSIS — F41.9 ANXIETY DISORDER, UNSPECIFIED: ICD-10-CM

## 2024-06-06 DIAGNOSIS — Z82.49 FAMILY HISTORY OF ISCHEMIC HEART DISEASE AND OTHER DISEASES OF THE CIRCULATORY SYSTEM: ICD-10-CM

## 2024-06-06 DIAGNOSIS — Z78.9 OTHER SPECIFIED HEALTH STATUS: ICD-10-CM

## 2024-06-06 DIAGNOSIS — Z86.39 PERSONAL HISTORY OF OTHER ENDOCRINE, NUTRITIONAL AND METABOLIC DISEASE: ICD-10-CM

## 2024-06-06 PROCEDURE — 99205 OFFICE O/P NEW HI 60 MIN: CPT

## 2024-06-06 PROCEDURE — G2211 COMPLEX E/M VISIT ADD ON: CPT

## 2024-06-06 RX ORDER — BUPROPION HYDROCHLORIDE 100 MG/1
100 TABLET, FILM COATED, EXTENDED RELEASE ORAL
Qty: 42 | Refills: 0 | Status: ACTIVE | COMMUNITY
Start: 2024-06-06 | End: 1900-01-01

## 2024-06-06 RX ORDER — SULFASALAZINE 500 MG/1
500 TABLET, DELAYED RELEASE ORAL TWICE DAILY
Refills: 0 | Status: ACTIVE | COMMUNITY
Start: 2020-08-03

## 2024-06-06 RX ORDER — ACETAMINOPHEN 500 MG
500 TABLET ORAL TWICE DAILY
Refills: 0 | Status: ACTIVE | COMMUNITY

## 2024-06-06 RX ORDER — DICLOFENAC SODIUM 50 MG/1
50 TABLET, DELAYED RELEASE ORAL
Qty: 180 | Refills: 1 | Status: DISCONTINUED | COMMUNITY
Start: 2023-03-29 | End: 2024-06-06

## 2024-06-06 RX ORDER — NALTREXONE HYDROCHLORIDE 50 MG/1
50 TABLET, FILM COATED ORAL DAILY
Qty: 15 | Refills: 0 | Status: ACTIVE | COMMUNITY
Start: 2024-06-06 | End: 1900-01-01

## 2024-06-06 NOTE — PHYSICAL EXAM
[Obese, well nourished, in no acute distress] : obese, well nourished, in no acute distress [Normal] : affect appropriate [de-identified] : Anicteric, no conjunctival injection or drainage noted [de-identified] : Supple, no obvious palpable masses or lymphadenopathy noted [de-identified] : Nonlabored respirations, equal chest rise, no audible wheezing [de-identified] : Regular rate and rhythm [de-identified] : walks with walker [de-identified] : soft, NT, ND, no evidence of hernia or diastasis; obese

## 2024-06-06 NOTE — END OF VISIT
[Time Spent: ___ minutes] : I have spent [unfilled] minutes of time on the encounter. [FreeTextEntry3] : I personally performed the services described in the documentation, reviewed the documentation recorded by the scribe in my presence and it accurately and completely records my words and actions.

## 2024-06-06 NOTE — ASSESSMENT
[FreeTextEntry1] : TIESHA SPENCE is a 69 year-old F with a long-standing h/o obesity, who presents today for initial evaluation for weight management options.   Had a lengthy discussion with the patient regarding nutrition, exercise, weight loss medications, and bariatric surgery. The patient qualifies for bariatric surgery but is not interested at this time. Reviewed surgical options such as the gastric Lina-en-Y bypass and Sleeve Gastrectomy, and the risks and benefits of each. Discussed how bariatric surgery may offer greater weight loss results with better long-term success. The patient qualifies for and is currently most interested in weight loss medications. The following risks of bariatric surgery were discussed with the patient with diagrams. All questions answered.   Complications were discussed including but not limited to: vitamin and protein deficiencies, pneumonia, urinary infection, wound infection, leaks/peritonitis possibly requiring intraabdominal drains or reoperation, bleeding, DVT, pulmonary embolus, severe reflux, sleeve obstruction, anastomotic stricture, anastomotic ulcer, abdominal wall hernias, gallstone formation, revisions, death, inadequate weight loss. The importance of vitamins and protein supplementation was stressed, as was the importance of follow-up and exercise.   Health maintenance and behavioral/nutrition counseling for obesity: An additional 30 minutes of the visit was spent counseling the patient regarding need for aggressive weight loss and behavior modification including nutrition and proper eating habits, including which foods to eat and avoid.   Emphasized the importance of making healthier food choices including fresh fruits and vegetables, lean meats, and protein sources. Recommended front loading calories, incorporating whole grains, and eliminating fast foods. Also discussed the importance of avoiding fried/fatty foods and foods containing high sugar content including juices/shakes/sodas/desserts.   Also encouraged beginning an exercise program and recommended cardiovascular exercises along with strength training to build lean muscle. Made suggestions on different types of exercises to try.   Patient will work on the following:   -Eliminate snacks -Focus on eating 3 well-balanced meals during the daytime with appropriate portion size -Cooking fresh meals rather than take out/processed/ready-made foods -Try to make vegetables the largest portion of each meal, followed by a lean protein (e.g. lentils, chicken, fish), and   finally a complex carbohydrate if still hungry (e.g. sweet potato, farro, quinoa)  -Incorporating exercise as tolerated (aim for 150 mins/week with both cardio- and strength-training)   Start Contrave based on labs and authorization. Contraindications for Contrave include concomitant use of opioids or Wellbutrin, a history of seizures, or uncontrolled hypertension. Possible side effects were discussed with the patient, including nausea, vomiting, constipation, and/or mental disturbances. Pt verbalizes understanding and wishes to proceed with medical therapy. Instructions for use provided. Pt understands the need for long-term use and understands the risk of weight-gain upon stopping weight loss medications.  Pt seen with Dr. Barajas   Patient educated to call with questions/concerns   All questions answered   Schedule follow-up for 3 weeks after starting weight loss medication.   Additional time spent before and after visit reviewing chart.

## 2024-06-06 NOTE — REVIEW OF SYSTEMS
[Recent Change In Weight] : ~T recent weight change [Joint Pain] : joint pain [Difficulty Walking] : difficulty walking [Anxiety] : anxiety [Patient Intake Form Reviewed] : Patient intake form was reviewed [Fatigue] : fatigue [Shortness Of Breath] : shortness of breath [Joint Stiffness] : joint stiffness [Negative] : Allergic/Immunologic [Fever] : no fever [Chills] : no chills [Night Sweats] : no night sweats [Wheezing] : no wheezing [Cough] : no cough [Muscle Pain] : no muscle pain [Muscle Weakness] : no muscle weakness [FreeTextEntry9] : loss of range of motion

## 2024-06-06 NOTE — HISTORY OF PRESENT ILLNESS
[de-identified] : TIESHA SPENCE is a 69-year-old F with a long-standing Hx of obesity presents today for initial evaluation for weight management options.   Reports a Hx of anxiety; reports no history of substance abuse, uncontrolled blood pressure, kidney stones, glaucoma, or pancreatitis. Reports no family history of thyroid cancer or MEN 2 syndrome.   Heaviest/current wt 251/251 lbs, lowest wt 180 lbs.   Diets/exercise programs tried in the past: WW Weight loss medications tried in the past: Adipex/Phentermine Reason for stopping weight loss medication if applicable: felt poorly and weight loss plateaued   History of bariatric surgery: no Obesity comorbidities: HLD, HTN, OA Comorbidities improved/resolved: n/a Anti-obesity medication: none Obesity medication side effects: n/a   Current dietary lifestyle: Breakfast: Coffee with bagel and cream cheese Lunch: sandwich; salad; cheese and crackers Dinner: pasta, chicken, rice, vegetables (e.g. asparagus, corn) Snacks: At 3 PM: cheese, fruit, cookies Drinks: water (<64 oz/day)   Activity Lifestyle: Sleep: 7-8 hrs (interrupted) Physical activity/exercise: daily activity (exercise limited by joint pain and walker-dependence) Work: Retired  Smoking/ETOH: Former smoker (quit 40 years ago); occasional EtOH use   Last Colonoscopy: 2019 Last Mammogram: 2023 Last Pap Smear: 2022

## 2024-06-06 NOTE — HISTORY OF PRESENT ILLNESS
[de-identified] : TIESHA SPENCE is a 69-year-old F with a long-standing Hx of obesity presents today for initial evaluation for weight management options.   Reports a Hx of anxiety; reports no history of substance abuse, uncontrolled blood pressure, kidney stones, glaucoma, or pancreatitis. Reports no family history of thyroid cancer or MEN 2 syndrome.   Heaviest/current wt 251/251 lbs, lowest wt 180 lbs.   Diets/exercise programs tried in the past: WW Weight loss medications tried in the past: Adipex/Phentermine Reason for stopping weight loss medication if applicable: felt poorly and weight loss plateaued   History of bariatric surgery: no Obesity comorbidities: HLD, HTN, OA Comorbidities improved/resolved: n/a Anti-obesity medication: none Obesity medication side effects: n/a   Current dietary lifestyle: Breakfast: Coffee with bagel and cream cheese Lunch: sandwich; salad; cheese and crackers Dinner: pasta, chicken, rice, vegetables (e.g. asparagus, corn) Snacks: At 3 PM: cheese, fruit, cookies Drinks: water (<64 oz/day)   Activity Lifestyle: Sleep: 7-8 hrs (interrupted) Physical activity/exercise: daily activity (exercise limited by joint pain and walker-dependence) Work: Retired  Smoking/ETOH: Former smoker (quit 40 years ago); occasional EtOH use   Last Colonoscopy: 2019 Last Mammogram: 2023 Last Pap Smear: 2022

## 2024-06-06 NOTE — PHYSICAL EXAM
[Obese, well nourished, in no acute distress] : obese, well nourished, in no acute distress [Normal] : affect appropriate [de-identified] : Anicteric, no conjunctival injection or drainage noted [de-identified] : Supple, no obvious palpable masses or lymphadenopathy noted [de-identified] : Nonlabored respirations, equal chest rise, no audible wheezing [de-identified] : Regular rate and rhythm [de-identified] : walks with walker [de-identified] : soft, NT, ND, no evidence of hernia or diastasis; obese

## 2024-06-18 ENCOUNTER — APPOINTMENT (OUTPATIENT)
Dept: ORTHOPEDIC SURGERY | Facility: CLINIC | Age: 69
End: 2024-06-18
Payer: MEDICARE

## 2024-06-18 VITALS
HEART RATE: 51 BPM | WEIGHT: 245 LBS | BODY MASS INDEX: 48.1 KG/M2 | DIASTOLIC BLOOD PRESSURE: 76 MMHG | SYSTOLIC BLOOD PRESSURE: 138 MMHG | HEIGHT: 60 IN

## 2024-06-18 DIAGNOSIS — Z96.649 PRESENCE OF UNSPECIFIED ARTIFICIAL HIP JOINT: ICD-10-CM

## 2024-06-18 PROCEDURE — 99024 POSTOP FOLLOW-UP VISIT: CPT

## 2024-06-18 PROCEDURE — 73502 X-RAY EXAM HIP UNI 2-3 VIEWS: CPT | Mod: RT

## 2024-06-18 NOTE — HISTORY OF PRESENT ILLNESS
[___ Months Post Op] : [unfilled] months post op [Chills] : no chills [Constipation] : no constipation [Diarrhea] : no diarrhea [Dysuria] : no dysuria [Fever] : no fever [Nausea] : no nausea [Vomiting] : no vomiting [de-identified] : Status post revision right total hip replacement on 3/25/24 [de-identified] : Patient is a 69-year-old female who presents today status post revision right total hip replacement for polyethylene wear and a prosthesis that was done 25 years ago.  Patient continues to go to physical therapy.  She is taking anti-inflammatories on appearing basis for the discomfort.  For the most part she is feeling slightly better she does have some discomfort in and around the back of her buttock [de-identified] : Leg lengths appear equal on the table.  Well-healed incision without erythema, drainage, or evidence of cellulitis.  Patient can flex her hip to approximately 95 degrees.  External rotation 30 degrees, internal rotation 20 degrees with slight groin discomfort.  Calves are soft, nontender, no evidence of DVT at this time.  She has good motor and sensory to both leg. [de-identified] : Revision right total hip replacement, in anatomical alignment, excellent bone to prosthesis interface, there is no gross evidence of prosthetic failure, all the components anatomically aligned. [de-identified] : Stable postoperative course of a right total hip replacement [de-identified] : The patient will continue an exercise program of walking and strength training.  She will continue with analgesics ice and other conservative treatment modalities.  Patient is having some discomfort at this point would like to get a CAT scan to evaluate periprosthetic region.  Patient will return after the CAT scan is obtained for more definitive treatment option for the discomfort she is having status post revision 3 months ago.

## 2024-06-20 ENCOUNTER — RX RENEWAL (OUTPATIENT)
Age: 69
End: 2024-06-20

## 2024-06-21 ENCOUNTER — OUTPATIENT (OUTPATIENT)
Dept: OUTPATIENT SERVICES | Facility: HOSPITAL | Age: 69
LOS: 1 days | End: 2024-06-21
Payer: MEDICARE

## 2024-06-21 ENCOUNTER — APPOINTMENT (OUTPATIENT)
Dept: CT IMAGING | Facility: CLINIC | Age: 69
End: 2024-06-21
Payer: MEDICARE

## 2024-06-21 ENCOUNTER — RESULT REVIEW (OUTPATIENT)
Age: 69
End: 2024-06-21

## 2024-06-21 DIAGNOSIS — Z98.891 HISTORY OF UTERINE SCAR FROM PREVIOUS SURGERY: Chronic | ICD-10-CM

## 2024-06-21 DIAGNOSIS — Z90.89 ACQUIRED ABSENCE OF OTHER ORGANS: Chronic | ICD-10-CM

## 2024-06-21 DIAGNOSIS — Z98.890 OTHER SPECIFIED POSTPROCEDURAL STATES: Chronic | ICD-10-CM

## 2024-06-21 DIAGNOSIS — Z96.649 PRESENCE OF UNSPECIFIED ARTIFICIAL HIP JOINT: ICD-10-CM

## 2024-06-21 DIAGNOSIS — Z96.652 PRESENCE OF LEFT ARTIFICIAL KNEE JOINT: Chronic | ICD-10-CM

## 2024-06-21 DIAGNOSIS — Z96.643 PRESENCE OF ARTIFICIAL HIP JOINT, BILATERAL: Chronic | ICD-10-CM

## 2024-06-21 DIAGNOSIS — Z96.651 PRESENCE OF RIGHT ARTIFICIAL KNEE JOINT: Chronic | ICD-10-CM

## 2024-06-21 PROCEDURE — 73700 CT LOWER EXTREMITY W/O DYE: CPT

## 2024-06-21 PROCEDURE — 76376 3D RENDER W/INTRP POSTPROCES: CPT | Mod: 26

## 2024-06-21 PROCEDURE — 76376 3D RENDER W/INTRP POSTPROCES: CPT

## 2024-06-21 PROCEDURE — 73700 CT LOWER EXTREMITY W/O DYE: CPT | Mod: 26,RT,MH

## 2024-07-18 ENCOUNTER — NON-APPOINTMENT (OUTPATIENT)
Age: 69
End: 2024-07-18

## 2024-07-30 ENCOUNTER — APPOINTMENT (OUTPATIENT)
Dept: ORTHOPEDIC SURGERY | Facility: CLINIC | Age: 69
End: 2024-07-30
Payer: MEDICARE

## 2024-07-30 ENCOUNTER — APPOINTMENT (OUTPATIENT)
Dept: SURGERY | Facility: CLINIC | Age: 69
End: 2024-07-30
Payer: MEDICARE

## 2024-07-30 VITALS
BODY MASS INDEX: 48.1 KG/M2 | WEIGHT: 245 LBS | HEART RATE: 73 BPM | DIASTOLIC BLOOD PRESSURE: 75 MMHG | HEIGHT: 60 IN | SYSTOLIC BLOOD PRESSURE: 136 MMHG

## 2024-07-30 VITALS
HEIGHT: 60 IN | HEART RATE: 81 BPM | RESPIRATION RATE: 17 BRPM | WEIGHT: 244.5 LBS | DIASTOLIC BLOOD PRESSURE: 72 MMHG | SYSTOLIC BLOOD PRESSURE: 169 MMHG | OXYGEN SATURATION: 98 % | BODY MASS INDEX: 48 KG/M2 | TEMPERATURE: 97.8 F

## 2024-07-30 DIAGNOSIS — Z96.649 PRESENCE OF UNSPECIFIED ARTIFICIAL HIP JOINT: ICD-10-CM

## 2024-07-30 DIAGNOSIS — Z56.0 UNEMPLOYMENT, UNSPECIFIED: ICD-10-CM

## 2024-07-30 DIAGNOSIS — Z83.3 FAMILY HISTORY OF DIABETES MELLITUS: ICD-10-CM

## 2024-07-30 DIAGNOSIS — E66.01 MORBID (SEVERE) OBESITY DUE TO EXCESS CALORIES: ICD-10-CM

## 2024-07-30 DIAGNOSIS — Z82.5 FAMILY HISTORY OF ASTHMA AND OTHER CHRONIC LOWER RESPIRATORY DISEASES: ICD-10-CM

## 2024-07-30 DIAGNOSIS — Z82.49 FAMILY HISTORY OF ISCHEMIC HEART DISEASE AND OTHER DISEASES OF THE CIRCULATORY SYSTEM: ICD-10-CM

## 2024-07-30 PROCEDURE — 99214 OFFICE O/P EST MOD 30 MIN: CPT

## 2024-07-30 PROCEDURE — 73502 X-RAY EXAM HIP UNI 2-3 VIEWS: CPT | Mod: RT

## 2024-07-30 RX ORDER — TIRZEPATIDE 2.5 MG/.5ML
2.5 INJECTION, SOLUTION SUBCUTANEOUS
Qty: 4 | Refills: 0 | Status: ACTIVE | COMMUNITY
Start: 2024-07-30 | End: 1900-01-01

## 2024-07-30 SDOH — ECONOMIC STABILITY - INCOME SECURITY: UNEMPLOYMENT, UNSPECIFIED: Z56.0

## 2024-07-30 NOTE — HISTORY OF PRESENT ILLNESS
[Improving] : improving [4] : an average pain level of 4/10 [Standing] : standing [Intermit.] : ~He/She~ states the symptoms seem to be intermittent [Hip Movement] : worsened by hip movement [Walking] : worsened by walking [de-identified] : 69-year-old female presents for follow-up of the right hip status post revision right total hip replacement on 3/25/2024.  Overall in the postoperative period patient states she is slowly improving.  She is doing well however she is still having some mild pain which she states is worse along the buttock.  She is continue with physical therapy with good improvement in symptoms.  She is continuing to ambulate with a cane more so for stability.  She says that overall she feels about 75% better.  She is continuing with Tylenol as well as Mobic with improvement in symptoms.  She is here for follow-up as well as review of CAT scan. Denies fevers, chills, chest pain, calf pain, shortness of breath.

## 2024-07-30 NOTE — PLAN
[FreeTextEntry1] :   I had a lengthy discussion with the patient regarding nutrition, exercise, weight loss medications, and bariatric surgery. The patient qualifies for bariatric surgery but is not interested at this time. We reviewed surgical options such as the gastric RNY bypass and sleeve gastrectomy and the risks and benefits. We discuss how bariatric surgery may offer greater weight loss results with better long-term success. Patient qualifies for and is currently most interested in weight loss medications.  I emphasized the importance of making healthier food choices including fresh fruits and vegetables, lean meats, and protein sources. I recommended front loading calories, incorporating whole grains, and eliminating fast foods. I also discussed the importance of avoiding fried/fatty foods and foods containing high sugar content including juices/shakes/sodas/desserts.  I also encouraged beginning an exercise program and recommended cardiovascular exercises along with strength training to build lean muscle. I made suggestions on different types of exercises to try.  Patient will work on the following: -Meet with nutritionist -Eliminate snacks -Focus on eating 3 well-balanced meals during the daytime with appropriate portion size -Cooking fresh meals rather than take out/processed/ready-made foods -Incorporating exercise; walk 8-10k steps daily -Obtain bloodwork  I have discussed initiating zepbound therapy for pt. All risks and benefits have been discussed with the patient.   Currently there are no contraindications for the use of zepbound after reviewing pts medical history and labs including personal or family history of thyroid cancer or MEN2. Possible side effects were discussed with the patient including nausea, reflux, constipation, delayed gastric emptying, or pancreatitis.  Pt verbalizes understanding and wishes to proceed with medical therapy. Start zepbound 2.5mg based on authorization and tolerance. Patient educated to call with questions/concerns. All questions answered.  Melissa Alanis MD  [facility] Bariatric Surgery  [address] [place] [address] [address] [place] [address]  tel. [phone]  fax [phone]

## 2024-07-30 NOTE — PLAN
[FreeTextEntry1] :   I had a lengthy discussion with the patient regarding nutrition, exercise, weight loss medications, and bariatric surgery. The patient qualifies for bariatric surgery but is not interested at this time. We reviewed surgical options such as the gastric RNY bypass and sleeve gastrectomy and the risks and benefits. We discuss how bariatric surgery may offer greater weight loss results with better long-term success. Patient qualifies for and is currently most interested in weight loss medications.  I emphasized the importance of making healthier food choices including fresh fruits and vegetables, lean meats, and protein sources. I recommended front loading calories, incorporating whole grains, and eliminating fast foods. I also discussed the importance of avoiding fried/fatty foods and foods containing high sugar content including juices/shakes/sodas/desserts.  I also encouraged beginning an exercise program and recommended cardiovascular exercises along with strength training to build lean muscle. I made suggestions on different types of exercises to try.  Patient will work on the following: -Meet with nutritionist -Eliminate snacks -Focus on eating 3 well-balanced meals during the daytime with appropriate portion size -Cooking fresh meals rather than take out/processed/ready-made foods -Incorporating exercise; walk 8-10k steps daily -Obtain bloodwork  I have discussed initiating zepbound therapy for pt. All risks and benefits have been discussed with the patient.   Currently there are no contraindications for the use of zepbound after reviewing pts medical history and labs including personal or family history of thyroid cancer or MEN2. Possible side effects were discussed with the patient including nausea, reflux, constipation, delayed gastric emptying, or pancreatitis.  Pt verbalizes understanding and wishes to proceed with medical therapy. Start zepbound 2.5mg based on authorization and tolerance. Patient educated to call with questions/concerns. All questions answered.  Melissa Alanis MD  [facility] Bariatric Surgery  [address] [place] [address] [address] [place] [address]  tel. [phone]  fax [phone]

## 2024-07-30 NOTE — PHYSICAL EXAM
[Normal] : affect appropriate [de-identified] : Well-appearing in NAD [de-identified] : Soft, nondistended, nontender

## 2024-07-30 NOTE — PHYSICAL EXAM
[Antalgic] : antalgic [LE] : Sensory: Intact in bilateral lower extremities [DP] : dorsalis pedis 2+ and symmetric bilaterally [Normal RLE] : Right Lower Extremity: No scars, rashes, lesions, ulcers, skin intact [Normal Touch] : sensation intact for touch [Normal] : no peripheral adenopathy appreciated [de-identified] : Leg lengths appear equal on the table. Well-healed incision without erythema, drainage, or evidence of cellulitis. Patient can flex her hip to approximately 95 degrees. External rotation 30 degrees, internal rotation 20 degrees with slight groin discomfort. Calves are soft, nontender, no evidence of DVT at this time. She has good motor and sensory to both leg. [de-identified] : Revision right total hip replacement, in anatomical alignment, excellent bone to prosthesis interface, there is no gross evidence of prosthetic failure, all the components anatomically aligned.  CT shows the following: Status post revision total hip arthroplasty as above. No evidence of osteolysis or loosening.  Subacute to chronic appearing fracture along the posterior inferior aspect of the acetabulum without evidence of osseous bridging.

## 2024-07-30 NOTE — HISTORY OF PRESENT ILLNESS
[de-identified] : Elizabeth is a 68 y/o female BMI 48 presents for initial weight loss consultation.   Pt has long standing obesity and has attempted several diet and exercise programs without any significant long term success.  Patient reports she has tried Adipex in the past for weight loss, but discontinued the medication due to side effects of a palpitation.  The excess weight is starting to significantly affect her health and lifestyle. Medical history is significant for hypertension, prediabetes, dyslipidemia, anxiety.  Surgical history is significant for hip replacement, knee replacement, 4 C-sections.  The patient denies prior history of blood clot or abnormal bleeding. The patient denies prior history of dysphagia or reflux. Interested in weight loss medications.    Weight Loss Medication Screening: Reports no history of anxiety, substance abuse, uncontrolled blood pressure, kidney stones, or pancreatitis. Reports no family history thyroid cancer or MEN 2 syndrome. History of bariatric surgery: none Obesity comorbidities: none Comorbidities improved/resolved: n/a Anti-obesity medication: phentermine Obesity medication side effects: increased anxiety, no significant weight loss

## 2024-07-30 NOTE — REASON FOR VISIT
[Follow-Up Visit] : a follow-up visit for [Artificial Hip Joint] : an artificial hip joint [Hip Pain] : hip pain [FreeTextEntry2] : Anterior revision  right total hip replacement 3/25/2024

## 2024-07-30 NOTE — END OF VISIT
[FreeTextEntry3] : I, Dr. Ibarra, personally performed the evaluation and management (E/M) services for this established patient who presents today with an exacerbation of an existing condition. That E/M includes conducting the clinically appropriate interval history &/or exam, assessing all new/exacerbated conditions, and establishing a new plan of care. Today, my WILFREDO, Valentin Macedo PA-C, was here to observe my evaluation and management service for this new problem/exacerbated condition and follow the plan of care established by me going forward.

## 2024-07-30 NOTE — DISCUSSION/SUMMARY
[de-identified] : Treatment options were reviewed in detail with the patient. Recommend continuing with conservative management at this time including rest, ice, anti-inflammatories, and warm moist heat as needed. They may continue to participate in daily activities within tolerances. Encouraged good home exercise program to increase strength and ROM.  All patients questions and concerns were addressed to satisfaction. Advised patient to call the office if any new or worsening symptoms arise. Discussed possible revision of the left total knee replacement due to metal on metal prosthesis.

## 2024-07-30 NOTE — PHYSICAL EXAM
[Normal] : affect appropriate [de-identified] : Well-appearing in NAD [de-identified] : Soft, nondistended, nontender

## 2024-07-30 NOTE — HISTORY OF PRESENT ILLNESS
[de-identified] : Elizabeth is a 68 y/o female BMI 48 presents for initial weight loss consultation.   Pt has long standing obesity and has attempted several diet and exercise programs without any significant long term success.  Patient reports she has tried Adipex in the past for weight loss, but discontinued the medication due to side effects of a palpitation.  The excess weight is starting to significantly affect her health and lifestyle. Medical history is significant for hypertension, prediabetes, dyslipidemia, anxiety.  Surgical history is significant for hip replacement, knee replacement, 4 C-sections.  The patient denies prior history of blood clot or abnormal bleeding. The patient denies prior history of dysphagia or reflux. Interested in weight loss medications.    Weight Loss Medication Screening: Reports no history of anxiety, substance abuse, uncontrolled blood pressure, kidney stones, or pancreatitis. Reports no family history thyroid cancer or MEN 2 syndrome. History of bariatric surgery: none Obesity comorbidities: none Comorbidities improved/resolved: n/a Anti-obesity medication: phentermine Obesity medication side effects: increased anxiety, no significant weight loss

## 2024-07-30 NOTE — DISCUSSION/SUMMARY
[de-identified] : Treatment options were reviewed in detail with the patient. Recommend continuing with conservative management at this time including rest, ice, anti-inflammatories, and warm moist heat as needed. They may continue to participate in daily activities within tolerances. Encouraged good home exercise program to increase strength and ROM.  All patients questions and concerns were addressed to satisfaction. Advised patient to call the office if any new or worsening symptoms arise. Discussed possible revision of the left total knee replacement due to metal on metal prosthesis.

## 2024-07-30 NOTE — PHYSICAL EXAM
[Antalgic] : antalgic [LE] : Sensory: Intact in bilateral lower extremities [DP] : dorsalis pedis 2+ and symmetric bilaterally [Normal RLE] : Right Lower Extremity: No scars, rashes, lesions, ulcers, skin intact [Normal Touch] : sensation intact for touch [Normal] : no peripheral adenopathy appreciated [de-identified] : Leg lengths appear equal on the table. Well-healed incision without erythema, drainage, or evidence of cellulitis. Patient can flex her hip to approximately 95 degrees. External rotation 30 degrees, internal rotation 20 degrees with slight groin discomfort. Calves are soft, nontender, no evidence of DVT at this time. She has good motor and sensory to both leg. [de-identified] : Revision right total hip replacement, in anatomical alignment, excellent bone to prosthesis interface, there is no gross evidence of prosthetic failure, all the components anatomically aligned.  CT shows the following: Status post revision total hip arthroplasty as above. No evidence of osteolysis or loosening.  Subacute to chronic appearing fracture along the posterior inferior aspect of the acetabulum without evidence of osseous bridging.

## 2024-07-30 NOTE — HISTORY OF PRESENT ILLNESS
[Improving] : improving [4] : an average pain level of 4/10 [Standing] : standing [Intermit.] : ~He/She~ states the symptoms seem to be intermittent [Hip Movement] : worsened by hip movement [Walking] : worsened by walking [de-identified] : 69-year-old female presents for follow-up of the right hip status post revision right total hip replacement on 3/25/2024.  Overall in the postoperative period patient states she is slowly improving.  She is doing well however she is still having some mild pain which she states is worse along the buttock.  She is continue with physical therapy with good improvement in symptoms.  She is continuing to ambulate with a cane more so for stability.  She says that overall she feels about 75% better.  She is continuing with Tylenol as well as Mobic with improvement in symptoms.  She is here for follow-up as well as review of CAT scan. Denies fevers, chills, chest pain, calf pain, shortness of breath.

## 2024-08-05 ENCOUNTER — APPOINTMENT (OUTPATIENT)
Dept: BARIATRICS | Facility: CLINIC | Age: 69
End: 2024-08-05

## 2024-08-05 LAB
25(OH)D3 SERPL-MCNC: 22.5 NG/ML
ALBUMIN SERPL ELPH-MCNC: 4.5 G/DL
ALP BLD-CCNC: 81 U/L
ALT SERPL-CCNC: 20 U/L
AMYLASE/CREAT SERPL: 58 U/L
ANION GAP SERPL CALC-SCNC: 17 MMOL/L
AST SERPL-CCNC: 20 U/L
BASOPHILS # BLD AUTO: 0.06 K/UL
BASOPHILS NFR BLD AUTO: 0.6 %
BILIRUB SERPL-MCNC: 0.6 MG/DL
BUN SERPL-MCNC: 28 MG/DL
CALCIUM SERPL-MCNC: 10.1 MG/DL
CHLORIDE SERPL-SCNC: 106 MMOL/L
CHOLEST SERPL-MCNC: 142 MG/DL
CO2 SERPL-SCNC: 23 MMOL/L
CREAT SERPL-MCNC: 0.72 MG/DL
EGFR: 89 ML/MIN/1.73M2
EOSINOPHIL # BLD AUTO: 0.23 K/UL
EOSINOPHIL NFR BLD AUTO: 2.2 %
ESTIMATED AVERAGE GLUCOSE: 134 MG/DL
FOLATE SERPL-MCNC: 6.6 NG/ML
GLUCOSE SERPL-MCNC: 121 MG/DL
HBA1C MFR BLD HPLC: 6.3 %
HCG SERPL-MCNC: 3 MIU/ML
HCT VFR BLD CALC: 48.4 %
HDLC SERPL-MCNC: 41 MG/DL
HGB BLD-MCNC: 15 G/DL
IMM GRANULOCYTES NFR BLD AUTO: 0.5 %
LDLC SERPL CALC-MCNC: 75 MG/DL
LPL SERPL-CCNC: 48 U/L
LYMPHOCYTES # BLD AUTO: 2.94 K/UL
LYMPHOCYTES NFR BLD AUTO: 28 %
MAN DIFF?: NORMAL
MCHC RBC-ENTMCNC: 28.4 PG
MCHC RBC-ENTMCNC: 31 GM/DL
MCV RBC AUTO: 91.7 FL
MONOCYTES # BLD AUTO: 0.95 K/UL
MONOCYTES NFR BLD AUTO: 9 %
NEUTROPHILS # BLD AUTO: 6.28 K/UL
NEUTROPHILS NFR BLD AUTO: 59.7 %
NONHDLC SERPL-MCNC: 101 MG/DL
PLATELET # BLD AUTO: 207 K/UL
POTASSIUM SERPL-SCNC: 4.3 MMOL/L
PROT SERPL-MCNC: 7.2 G/DL
RBC # BLD: 5.28 M/UL
RBC # FLD: 16.1 %
SODIUM SERPL-SCNC: 147 MMOL/L
TRIGL SERPL-MCNC: 145 MG/DL
TSH SERPL-ACNC: 2.12 UIU/ML
VIT B12 SERPL-MCNC: 572 PG/ML
WBC # FLD AUTO: 10.51 K/UL

## 2024-08-06 LAB — VIT B1 SERPL-MCNC: 116.9 NMOL/L

## 2024-08-19 NOTE — PRE-OP CHECKLIST - MEDICAL/PEDIATRIC CLEARANCE ON MEDICAL RECORD
Get a lotion applicator. Recommend using ceraVe or Cetaphil moisturizer. Detail Level: [facility]

## 2024-09-19 ENCOUNTER — RX RENEWAL (OUTPATIENT)
Age: 69
End: 2024-09-19

## 2024-09-24 ENCOUNTER — APPOINTMENT (OUTPATIENT)
Dept: SURGERY | Facility: CLINIC | Age: 69
End: 2024-09-24
Payer: MEDICARE

## 2024-09-24 DIAGNOSIS — R73.03 PREDIABETES.: ICD-10-CM

## 2024-09-24 DIAGNOSIS — E66.01 MORBID (SEVERE) OBESITY DUE TO EXCESS CALORIES: ICD-10-CM

## 2024-09-24 PROCEDURE — 99213 OFFICE O/P EST LOW 20 MIN: CPT

## 2024-09-24 NOTE — HISTORY OF PRESENT ILLNESS
[de-identified] : Elizabeth is a 69 year old female presenting for a follow up.  Insurance will not cover weight loss medications.  Patient very uninterested in bariatric surgery.  A1C 6.3%- prediabetes.

## 2024-09-24 NOTE — REASON FOR VISIT
[Follow-Up Visit] : a follow-up visit for [Home] : at home, [unfilled] , at the time of the visit. [Medical Office: ([facility])___] : at the medical office located in  [Patient] : the patient [Self] : self

## 2024-09-24 NOTE — REASON FOR VISIT
[Follow-Up Visit] : a follow-up visit for [Home] : at home, [unfilled] , at the time of the visit. [Medical Office: ([facility])___] : at the medical office located in  [Patient] : the patient [Self] : self

## 2024-09-24 NOTE — PLAN
[FreeTextEntry1] : Discussed again with patient Medicare will not cover any weight loss medications.  She does have pre-diabetes and would be a good candidate for mounjaro, so will submit rx and another PA. Again explained to patient weight loss surgery is covered by medicare, and is proven to be the most safe and effective long term treatment for obesity.  She stated she has to have too many other surgeries and does not want another.  Will follow up after determination for mounjaro.  Melissa Alanis MD  [facility] Bariatric Surgery  [address] [place] [address]  tel. [phone]  fax [phone]

## 2024-09-24 NOTE — PLAN
[FreeTextEntry1] : Discussed again with patient Medicare will not cover any weight loss medications.  She does have pre-diabetes and would be a good candidate for mounjaro, so will submit rx and another PA. Again explained to patient weight loss surgery is covered by medicare, and is proven to be the most safe and effective long term treatment for obesity.  She stated she has to have too many other surgeries and does not want another.  Will follow up after determination for mounjaro.  Melissa Alanis MD  [facility] Bariatric Surgery  [address] [place] [address]  tel. [phone]  fax [phone]

## 2024-09-24 NOTE — HISTORY OF PRESENT ILLNESS
[de-identified] : Elizabeth is a 69 year old female presenting for a follow up.  Insurance will not cover weight loss medications.  Patient very uninterested in bariatric surgery.  A1C 6.3%- prediabetes.

## 2024-09-30 RX ORDER — TIRZEPATIDE 2.5 MG/.5ML
2.5 INJECTION, SOLUTION SUBCUTANEOUS
Qty: 4 | Refills: 0 | Status: ACTIVE | COMMUNITY
Start: 2024-09-24

## 2024-10-04 PROBLEM — Z96.649 S/P REVISION OF TOTAL HIP: Status: ACTIVE | Noted: 2024-10-04

## 2024-10-08 ENCOUNTER — APPOINTMENT (OUTPATIENT)
Dept: ORTHOPEDIC SURGERY | Facility: CLINIC | Age: 69
End: 2024-10-08

## 2024-10-08 VITALS — BODY MASS INDEX: 48.1 KG/M2 | HEIGHT: 60 IN | WEIGHT: 245 LBS

## 2024-10-08 DIAGNOSIS — Z96.649 PRESENCE OF UNSPECIFIED ARTIFICIAL HIP JOINT: ICD-10-CM

## 2024-10-08 DIAGNOSIS — Z96.652 PRESENCE OF LEFT ARTIFICIAL KNEE JOINT: ICD-10-CM

## 2024-10-08 PROCEDURE — 99214 OFFICE O/P EST MOD 30 MIN: CPT

## 2024-10-08 PROCEDURE — 73562 X-RAY EXAM OF KNEE 3: CPT | Mod: LT

## 2024-10-08 PROCEDURE — 73501 X-RAY EXAM HIP UNI 1 VIEW: CPT | Mod: RT

## 2024-10-14 VITALS — HEIGHT: 60 IN | BODY MASS INDEX: 47.71 KG/M2 | WEIGHT: 243 LBS

## 2024-10-14 RX ORDER — TIRZEPATIDE 5 MG/.5ML
5 INJECTION, SOLUTION SUBCUTANEOUS
Qty: 1 | Refills: 0 | Status: ACTIVE | COMMUNITY
Start: 2024-10-14 | End: 1900-01-01

## 2024-11-13 ENCOUNTER — APPOINTMENT (OUTPATIENT)
Dept: SURGERY | Facility: CLINIC | Age: 69
End: 2024-11-13
Payer: MEDICARE

## 2024-11-13 VITALS — WEIGHT: 238 LBS | BODY MASS INDEX: 46.72 KG/M2 | HEIGHT: 60 IN

## 2024-11-13 DIAGNOSIS — E66.9 OBESITY, UNSPECIFIED: ICD-10-CM

## 2024-11-13 PROCEDURE — 99213 OFFICE O/P EST LOW 20 MIN: CPT

## 2024-11-19 ENCOUNTER — TRANSCRIPTION ENCOUNTER (OUTPATIENT)
Age: 69
End: 2024-11-19

## 2024-11-19 ENCOUNTER — APPOINTMENT (OUTPATIENT)
Dept: INTERNAL MEDICINE | Facility: CLINIC | Age: 69
End: 2024-11-19
Payer: MEDICARE

## 2024-11-19 VITALS
WEIGHT: 239 LBS | TEMPERATURE: 98.3 F | HEART RATE: 93 BPM | RESPIRATION RATE: 16 BRPM | OXYGEN SATURATION: 95 % | BODY MASS INDEX: 46.92 KG/M2 | DIASTOLIC BLOOD PRESSURE: 74 MMHG | SYSTOLIC BLOOD PRESSURE: 126 MMHG | HEIGHT: 60 IN

## 2024-11-19 DIAGNOSIS — N93.9 ABNORMAL UTERINE AND VAGINAL BLEEDING, UNSPECIFIED: ICD-10-CM

## 2024-11-19 DIAGNOSIS — Z01.818 ENCOUNTER FOR OTHER PREPROCEDURAL EXAMINATION: ICD-10-CM

## 2024-11-19 PROCEDURE — 99214 OFFICE O/P EST MOD 30 MIN: CPT

## 2024-11-20 PROBLEM — Z01.818 PREOP EXAMINATION: Status: ACTIVE | Noted: 2024-11-20

## 2024-11-20 PROBLEM — N93.9 UTERINE BLEEDING: Status: ACTIVE | Noted: 2024-11-20

## 2024-11-21 DIAGNOSIS — E87.6 HYPOKALEMIA: ICD-10-CM

## 2024-11-21 RX ORDER — POTASSIUM CHLORIDE 750 MG/1
10 TABLET, FILM COATED, EXTENDED RELEASE ORAL DAILY
Qty: 90 | Refills: 1 | Status: ACTIVE | COMMUNITY
Start: 2024-11-21 | End: 1900-01-01

## 2025-01-07 ENCOUNTER — APPOINTMENT (OUTPATIENT)
Dept: ORTHOPEDIC SURGERY | Facility: CLINIC | Age: 70
End: 2025-01-07
Payer: MEDICARE

## 2025-01-07 DIAGNOSIS — T84.018D BROKEN INTERNAL JOINT PROSTHESIS, OTHER SITE, SUBSEQUENT ENCOUNTER: ICD-10-CM

## 2025-01-07 DIAGNOSIS — Z96.649 BROKEN INTERNAL JOINT PROSTHESIS, OTHER SITE, SUBSEQUENT ENCOUNTER: ICD-10-CM

## 2025-01-07 DIAGNOSIS — Z96.641 PRESENCE OF RIGHT ARTIFICIAL HIP JOINT: ICD-10-CM

## 2025-01-07 DIAGNOSIS — T84.013A BROKEN INTERNAL LEFT KNEE PROSTHESIS, INITIAL ENCOUNTER: ICD-10-CM

## 2025-01-07 PROCEDURE — 73502 X-RAY EXAM HIP UNI 2-3 VIEWS: CPT | Mod: RT

## 2025-01-07 PROCEDURE — 99214 OFFICE O/P EST MOD 30 MIN: CPT

## 2025-01-07 PROCEDURE — 73562 X-RAY EXAM OF KNEE 3: CPT | Mod: LT

## 2025-01-08 ENCOUNTER — APPOINTMENT (OUTPATIENT)
Dept: SURGERY | Facility: CLINIC | Age: 70
End: 2025-01-08

## 2025-01-08 ENCOUNTER — APPOINTMENT (OUTPATIENT)
Dept: SURGERY | Facility: CLINIC | Age: 70
End: 2025-01-08
Payer: MEDICARE

## 2025-01-08 VITALS — WEIGHT: 226 LBS | HEIGHT: 60 IN | BODY MASS INDEX: 44.37 KG/M2

## 2025-01-08 DIAGNOSIS — E66.9 OBESITY, UNSPECIFIED: ICD-10-CM

## 2025-01-08 PROCEDURE — 99213 OFFICE O/P EST LOW 20 MIN: CPT

## 2025-01-16 ENCOUNTER — NON-APPOINTMENT (OUTPATIENT)
Age: 70
End: 2025-01-16

## 2025-01-17 ENCOUNTER — EMERGENCY (EMERGENCY)
Facility: HOSPITAL | Age: 70
LOS: 1 days | Discharge: HOME | End: 2025-01-17
Attending: EMERGENCY MEDICINE | Admitting: EMERGENCY MEDICINE
Payer: MEDICARE

## 2025-01-17 ENCOUNTER — TRANSCRIPTION ENCOUNTER (OUTPATIENT)
Age: 70
End: 2025-01-17

## 2025-01-17 ENCOUNTER — APPOINTMENT (OUTPATIENT)
Dept: INTERNAL MEDICINE | Facility: CLINIC | Age: 70
End: 2025-01-17

## 2025-01-17 VITALS
DIASTOLIC BLOOD PRESSURE: 73 MMHG | RESPIRATION RATE: 15 BRPM | OXYGEN SATURATION: 99 % | HEART RATE: 93 BPM | WEIGHT: 225.09 LBS | TEMPERATURE: 99 F | SYSTOLIC BLOOD PRESSURE: 183 MMHG

## 2025-01-17 VITALS
TEMPERATURE: 98 F | HEART RATE: 57 BPM | RESPIRATION RATE: 18 BRPM | OXYGEN SATURATION: 96 % | SYSTOLIC BLOOD PRESSURE: 158 MMHG | DIASTOLIC BLOOD PRESSURE: 73 MMHG

## 2025-01-17 DIAGNOSIS — Z96.652 PRESENCE OF LEFT ARTIFICIAL KNEE JOINT: Chronic | ICD-10-CM

## 2025-01-17 DIAGNOSIS — Z98.890 OTHER SPECIFIED POSTPROCEDURAL STATES: Chronic | ICD-10-CM

## 2025-01-17 DIAGNOSIS — Z90.89 ACQUIRED ABSENCE OF OTHER ORGANS: Chronic | ICD-10-CM

## 2025-01-17 DIAGNOSIS — Z96.643 PRESENCE OF ARTIFICIAL HIP JOINT, BILATERAL: Chronic | ICD-10-CM

## 2025-01-17 DIAGNOSIS — Z96.651 PRESENCE OF RIGHT ARTIFICIAL KNEE JOINT: Chronic | ICD-10-CM

## 2025-01-17 DIAGNOSIS — Z98.891 HISTORY OF UTERINE SCAR FROM PREVIOUS SURGERY: Chronic | ICD-10-CM

## 2025-01-17 LAB
ALBUMIN SERPL ELPH-MCNC: 4 G/DL — SIGNIFICANT CHANGE UP (ref 3.3–5)
ALP SERPL-CCNC: 61 U/L — SIGNIFICANT CHANGE UP (ref 30–120)
ALT FLD-CCNC: 27 U/L — SIGNIFICANT CHANGE UP (ref 10–60)
ANION GAP SERPL CALC-SCNC: 12 MMOL/L — SIGNIFICANT CHANGE UP (ref 5–17)
APPEARANCE UR: CLEAR — SIGNIFICANT CHANGE UP
AST SERPL-CCNC: 24 U/L — SIGNIFICANT CHANGE UP (ref 10–40)
BACTERIA # UR AUTO: ABNORMAL /HPF
BASOPHILS # BLD AUTO: 0.03 K/UL — SIGNIFICANT CHANGE UP (ref 0–0.2)
BASOPHILS NFR BLD AUTO: 0.4 % — SIGNIFICANT CHANGE UP (ref 0–2)
BILIRUB SERPL-MCNC: 1.5 MG/DL — HIGH (ref 0.2–1.2)
BILIRUB UR-MCNC: ABNORMAL
BUN SERPL-MCNC: 20 MG/DL — SIGNIFICANT CHANGE UP (ref 7–23)
CALCIUM SERPL-MCNC: 9.9 MG/DL — SIGNIFICANT CHANGE UP (ref 8.4–10.5)
CHLORIDE SERPL-SCNC: 100 MMOL/L — SIGNIFICANT CHANGE UP (ref 96–108)
CO2 SERPL-SCNC: 27 MMOL/L — SIGNIFICANT CHANGE UP (ref 22–31)
COD CRY URNS QL: PRESENT
COLOR SPEC: SIGNIFICANT CHANGE UP
CREAT SERPL-MCNC: 0.87 MG/DL — SIGNIFICANT CHANGE UP (ref 0.5–1.3)
DIFF PNL FLD: NEGATIVE — SIGNIFICANT CHANGE UP
EGFR: 71 ML/MIN/1.73M2 — SIGNIFICANT CHANGE UP
EOSINOPHIL # BLD AUTO: 0.14 K/UL — SIGNIFICANT CHANGE UP (ref 0–0.5)
EOSINOPHIL NFR BLD AUTO: 1.8 % — SIGNIFICANT CHANGE UP (ref 0–6)
EPI CELLS # UR: PRESENT
GLUCOSE SERPL-MCNC: 141 MG/DL — HIGH (ref 70–99)
GLUCOSE UR QL: NEGATIVE MG/DL — SIGNIFICANT CHANGE UP
HCT VFR BLD CALC: 44.7 % — SIGNIFICANT CHANGE UP (ref 34.5–45)
HGB BLD-MCNC: 15 G/DL — SIGNIFICANT CHANGE UP (ref 11.5–15.5)
IMM GRANULOCYTES NFR BLD AUTO: 0.5 % — SIGNIFICANT CHANGE UP (ref 0–0.9)
KETONES UR-MCNC: NEGATIVE MG/DL — SIGNIFICANT CHANGE UP
LEUKOCYTE ESTERASE UR-ACNC: ABNORMAL
LIDOCAIN IGE QN: 54 U/L — SIGNIFICANT CHANGE UP (ref 16–77)
LYMPHOCYTES # BLD AUTO: 2.1 K/UL — SIGNIFICANT CHANGE UP (ref 1–3.3)
LYMPHOCYTES # BLD AUTO: 26.8 % — SIGNIFICANT CHANGE UP (ref 13–44)
MCHC RBC-ENTMCNC: 30.1 PG — SIGNIFICANT CHANGE UP (ref 27–34)
MCHC RBC-ENTMCNC: 33.6 G/DL — SIGNIFICANT CHANGE UP (ref 32–36)
MCV RBC AUTO: 89.8 FL — SIGNIFICANT CHANGE UP (ref 80–100)
MONOCYTES # BLD AUTO: 0.49 K/UL — SIGNIFICANT CHANGE UP (ref 0–0.9)
MONOCYTES NFR BLD AUTO: 6.3 % — SIGNIFICANT CHANGE UP (ref 2–14)
NEUTROPHILS # BLD AUTO: 5.04 K/UL — SIGNIFICANT CHANGE UP (ref 1.8–7.4)
NEUTROPHILS NFR BLD AUTO: 64.2 % — SIGNIFICANT CHANGE UP (ref 43–77)
NITRITE UR-MCNC: NEGATIVE — SIGNIFICANT CHANGE UP
NRBC # BLD: 0 /100 WBCS — SIGNIFICANT CHANGE UP (ref 0–0)
PH UR: 5.5 — SIGNIFICANT CHANGE UP (ref 5–8)
PLATELET # BLD AUTO: 216 K/UL — SIGNIFICANT CHANGE UP (ref 150–400)
POTASSIUM SERPL-MCNC: 3.2 MMOL/L — LOW (ref 3.5–5.3)
POTASSIUM SERPL-SCNC: 3.2 MMOL/L — LOW (ref 3.5–5.3)
PROT SERPL-MCNC: 7.4 G/DL — SIGNIFICANT CHANGE UP (ref 6–8.3)
PROT UR-MCNC: 30 MG/DL
RBC # BLD: 4.98 M/UL — SIGNIFICANT CHANGE UP (ref 3.8–5.2)
RBC # FLD: 13.1 % — SIGNIFICANT CHANGE UP (ref 10.3–14.5)
RBC CASTS # UR COMP ASSIST: 1 /HPF — SIGNIFICANT CHANGE UP (ref 0–4)
SODIUM SERPL-SCNC: 139 MMOL/L — SIGNIFICANT CHANGE UP (ref 135–145)
SP GR SPEC: 1.03 — SIGNIFICANT CHANGE UP (ref 1–1.03)
TROPONIN I, HIGH SENSITIVITY RESULT: 50.5 NG/L — SIGNIFICANT CHANGE UP
TROPONIN I, HIGH SENSITIVITY RESULT: 60.9 NG/L — HIGH
TROPONIN I, HIGH SENSITIVITY RESULT: 7.9 NG/L — SIGNIFICANT CHANGE UP
UROBILINOGEN FLD QL: 0.2 MG/DL — SIGNIFICANT CHANGE UP (ref 0.2–1)
WBC # BLD: 7.84 K/UL — SIGNIFICANT CHANGE UP (ref 3.8–10.5)
WBC # FLD AUTO: 7.84 K/UL — SIGNIFICANT CHANGE UP (ref 3.8–10.5)
WBC UR QL: 5 /HPF — SIGNIFICANT CHANGE UP (ref 0–5)

## 2025-01-17 PROCEDURE — 85025 COMPLETE CBC W/AUTO DIFF WBC: CPT

## 2025-01-17 PROCEDURE — 36415 COLL VENOUS BLD VENIPUNCTURE: CPT

## 2025-01-17 PROCEDURE — 99285 EMERGENCY DEPT VISIT HI MDM: CPT | Mod: 25

## 2025-01-17 PROCEDURE — 76700 US EXAM ABDOM COMPLETE: CPT

## 2025-01-17 PROCEDURE — 80053 COMPREHEN METABOLIC PANEL: CPT

## 2025-01-17 PROCEDURE — 81001 URINALYSIS AUTO W/SCOPE: CPT

## 2025-01-17 PROCEDURE — 96365 THER/PROPH/DIAG IV INF INIT: CPT

## 2025-01-17 PROCEDURE — 84484 ASSAY OF TROPONIN QUANT: CPT

## 2025-01-17 PROCEDURE — G0378: CPT

## 2025-01-17 PROCEDURE — 76700 US EXAM ABDOM COMPLETE: CPT | Mod: 26

## 2025-01-17 PROCEDURE — 93010 ELECTROCARDIOGRAM REPORT: CPT

## 2025-01-17 PROCEDURE — 93005 ELECTROCARDIOGRAM TRACING: CPT

## 2025-01-17 PROCEDURE — 99223 1ST HOSP IP/OBS HIGH 75: CPT

## 2025-01-17 PROCEDURE — 99285 EMERGENCY DEPT VISIT HI MDM: CPT

## 2025-01-17 PROCEDURE — 83690 ASSAY OF LIPASE: CPT

## 2025-01-17 PROCEDURE — 96361 HYDRATE IV INFUSION ADD-ON: CPT

## 2025-01-17 PROCEDURE — 96375 TX/PRO/DX INJ NEW DRUG ADDON: CPT

## 2025-01-17 RX ORDER — ONDANSETRON 4 MG/1
4 TABLET ORAL EVERY 8 HOURS
Refills: 0 | Status: DISCONTINUED | OUTPATIENT
Start: 2025-01-17 | End: 2025-01-20

## 2025-01-17 RX ORDER — TIRZEPATIDE 7.5 MG/.5ML
10 INJECTION, SOLUTION SUBCUTANEOUS
Refills: 0 | DISCHARGE

## 2025-01-17 RX ORDER — HYDROMORPHONE HCL 4 MG
1 TABLET ORAL
Qty: 12 | Refills: 0
Start: 2025-01-17 | End: 2025-01-19

## 2025-01-17 RX ORDER — ACETAMINOPHEN 80 MG/.8ML
650 SOLUTION/ DROPS ORAL EVERY 6 HOURS
Refills: 0 | Status: DISCONTINUED | OUTPATIENT
Start: 2025-01-17 | End: 2025-01-20

## 2025-01-17 RX ORDER — ONDANSETRON 4 MG/1
4 TABLET ORAL ONCE
Refills: 0 | Status: COMPLETED | OUTPATIENT
Start: 2025-01-17 | End: 2025-01-17

## 2025-01-17 RX ORDER — POTASSIUM CHLORIDE 600 MG/1
10 TABLET, FILM COATED, EXTENDED RELEASE ORAL ONCE
Refills: 0 | Status: COMPLETED | OUTPATIENT
Start: 2025-01-17 | End: 2025-01-17

## 2025-01-17 RX ORDER — ATORVASTATIN CALCIUM 40 MG/1
1 TABLET, FILM COATED ORAL
Refills: 0 | DISCHARGE

## 2025-01-17 RX ORDER — DULOXETINE HYDROCHLORIDE 30 MG/1
1 CAPSULE, DELAYED RELEASE ORAL
Refills: 0 | DISCHARGE

## 2025-01-17 RX ORDER — GINKGO BILOBA 40 MG
3 CAPSULE ORAL AT BEDTIME
Refills: 0 | Status: DISCONTINUED | OUTPATIENT
Start: 2025-01-17 | End: 2025-01-20

## 2025-01-17 RX ORDER — SODIUM CHLORIDE 9 MG/ML
1000 INJECTION, SOLUTION INTRAMUSCULAR; INTRAVENOUS; SUBCUTANEOUS ONCE
Refills: 0 | Status: COMPLETED | OUTPATIENT
Start: 2025-01-17 | End: 2025-01-17

## 2025-01-17 RX ORDER — ACETAMINOPHEN 80 MG/.8ML
2 SOLUTION/ DROPS ORAL
Qty: 40 | Refills: 0
Start: 2025-01-17 | End: 2025-01-21

## 2025-01-17 RX ORDER — METOPROLOL TARTRATE 50 MG
1 TABLET ORAL
Refills: 0 | DISCHARGE

## 2025-01-17 RX ORDER — MAG HYDROX/ALUMINUM HYD/SIMETH 200-200-20
30 SUSPENSION, ORAL (FINAL DOSE FORM) ORAL EVERY 4 HOURS
Refills: 0 | Status: DISCONTINUED | OUTPATIENT
Start: 2025-01-17 | End: 2025-01-20

## 2025-01-17 RX ORDER — MORPHINE SULFATE 15 MG
4 TABLET, EXTENDED RELEASE ORAL ONCE
Refills: 0 | Status: DISCONTINUED | OUTPATIENT
Start: 2025-01-17 | End: 2025-01-17

## 2025-01-17 RX ADMIN — Medication 4 MILLIGRAM(S): at 08:31

## 2025-01-17 RX ADMIN — Medication 4 MILLIGRAM(S): at 08:46

## 2025-01-17 RX ADMIN — SODIUM CHLORIDE 1000 MILLILITER(S): 9 INJECTION, SOLUTION INTRAMUSCULAR; INTRAVENOUS; SUBCUTANEOUS at 08:38

## 2025-01-17 RX ADMIN — ONDANSETRON 4 MILLIGRAM(S): 4 TABLET ORAL at 08:31

## 2025-01-17 RX ADMIN — POTASSIUM CHLORIDE 100 MILLIEQUIVALENT(S): 600 TABLET, FILM COATED, EXTENDED RELEASE ORAL at 11:03

## 2025-01-17 RX ADMIN — POTASSIUM CHLORIDE 10 MILLIEQUIVALENT(S): 600 TABLET, FILM COATED, EXTENDED RELEASE ORAL at 12:05

## 2025-01-17 RX ADMIN — SODIUM CHLORIDE 1000 MILLILITER(S): 9 INJECTION, SOLUTION INTRAMUSCULAR; INTRAVENOUS; SUBCUTANEOUS at 10:50

## 2025-01-17 NOTE — ED PROVIDER NOTE - PROGRESS NOTE DETAILS
d/w surg PA, will see pt d/w raz (attending cards) he saw eval pt and relates unlikely [facility] surg pa saw eval pt, cleared for d/c if tolerates po 3rd trop increased by about 10, d/w wayne (attending hospitalist) will admit for observation

## 2025-01-17 NOTE — DISCHARGE NOTE PROVIDER - NSDCFUSCHEDAPPT_GEN_ALL_CORE_FT
Franklin Ibarra  [facility]  ORTHOSURG [address]  Scheduled Appointment: 03/17/2025

## 2025-01-17 NOTE — DISCHARGE NOTE PROVIDER - CARE PROVIDER_API CALL
Starr Castaneda  Internal Medicine  [address] [place] [id number]  Phone: [phone]  Fax: [phone]  Follow Up Time:

## 2025-01-17 NOTE — H&P ADULT - NSHPREVIEWOFSYSTEMS_GEN_ALL_CORE
REVIEW OF SYSTEMS:  CONSTITUTIONAL: No fever, weight loss, or fatigue  ENMT:  No difficulty hearing, tinnitus, vertigo; No sinus or throat pain  RESPIRATORY: No cough, wheezing, chills or hemoptysis; No shortness of breath  CARDIOVASCULAR: No chest pain, palpitations, dizziness, or leg swelling  GASTROINTESTINAL: admits abdominal  pain. No constipation, No nausea, vomiting, or hematemesis; No diarrhea   GENITOURINARY: No dysuria, frequency, hematuria, or incontinence  NEUROLOGICAL: No headaches, memory loss, loss of strength, numbness, or tremors  SKIN: No itching, burning, rashes, or lesions   MUSCULOSKELETAL: No pain or swelling or redness

## 2025-01-17 NOTE — H&P ADULT - NSHPPHYSICALEXAM_GEN_ALL_CORE
GENERAL: patient appears well, no acute distress, appropriate behavior  EYES: sclera clear, no exudates, PERRLA  ENMT: moist mucous membranes, oropharynx clear without erythema, no exudates  LUNGS:  no increased work of breathing, no wheezing appreciated  HEART: no lower extremity edema appreciated  GASTROINTESTINAL: Mild epigastric tenderness present, abdomen is nondistended, no palpable masses appreciated  INTEGUMENT: good skin turgor, warm, dry and intact, no lesions appreciated  MUSCULOSKELETAL: No tenderness or swelling present   NEUROLOGIC: awake, alert, oriented x3, strength no obvious sensory deficits

## 2025-01-17 NOTE — ED PROVIDER NOTE - CLINICAL SUMMARY MEDICAL DECISION MAKING FREE TEXT BOX
Patient complaining of right upper quadrant abdominal pain wrapping around her upper abdomen radiating into her chest and back.  Patient relates pain began this morning while brushing her teeth did not eat yet this morning.  Pain waxing and waning intensity.  No fevers chills chest pain short of breath cough vomiting diarrhea dysuria hematuria frequency.  PMD Tonya    Plan EKG abdominal ultrasound labs IV fluids morphine Zofran    Differential including but not limited to MI biliary colic cholecystitis enteritis gastritis esophagitis colitis

## 2025-01-17 NOTE — H&P ADULT - ASSESSMENT
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

## 2025-01-17 NOTE — ED ADULT NURSE NOTE - OBJECTIVE STATEMENT
Patient complaining of right upper quadrant abdominal pain wrapping around her upper abdomen radiating into her chest and back.  Patient relates pain began this morning while brushing her teeth did not eat yet this morning.  Pain waxing and waning intensity.  No fevers chills chest pain short of breath cough vomiting diarrhea dysuria hematuria frequency.

## 2025-01-17 NOTE — DISCHARGE NOTE PROVIDER - HOSPITAL COURSE
69 year old female with a history of HTN, HL, obesity on Mounjaro, gallstone for past 20 years, anomalous coronary artery who presents with abdominal pain. Pt was admitted for Biliary colic and Cholelithiasis. USG abdomen showed Cholelithiasis, Hepatic steatosis. Surgery consulted, no acute intervention needed, Diet was advanced and she tolerated well. Pt also had elevated troponin 69 year old female with a history of HTN, HL, obesity on Mounjaro, gallstone for past 20 years, anomalous coronary artery who presents with abdominal pain. Pt was admitted for Biliary colic and Cholelithiasis. USG abdomen showed Cholelithiasis, Hepatic steatosis. Surgery consulted, no acute intervention needed, Diet was advanced and she tolerated well. Pt also had slightly elevated troponin, ECG with sinus rhythm and no evidence of ischemia/infarction, Cardiology consulted, elevated troponin is likely due to demand. No further inpatient cardiac work-up indicated at this time. Pt is hemodynamically stable for discharge.

## 2025-01-17 NOTE — ED PROVIDER NOTE - DIFFERENTIAL DIAGNOSIS
Differential Diagnosis Differential including but not limited to MI biliary colic cholecystitis enteritis gastritis esophagitis colitis

## 2025-01-17 NOTE — H&P ADULT - HISTORY OF PRESENT ILLNESS
69 year old female with a history of HTN, HL, obesity on Mounjaro, gallstone for past 20 years, anomalous coronary artery who presents with abdominal pain. She states she developed this morning upper abdominal pain with some radiation to the epigastric region and bandlike across the upper abdomen to the back. No chest pain or shortness of breath, no fever, chills, nausea, vomiting, diarrhea, constipation, leg swelling, leg cramps. She  states she has gallstone for many years but never required surgery  She states that she had extensive cardiac work-up in the past including coronary angiography which revealed an anomalous coronary artery (details unknown) but otherwise no obstructive CAD. During my evaluation her pain almost resolved, tolerated diet well.   Pt was evaluated by surgery in the ED, no acute intervention needed, Pt had slightly elevated troponin, cardiology was consulted and pt was cleared for discharge.

## 2025-01-17 NOTE — CONSULT NOTE ADULT - SUBJECTIVE AND OBJECTIVE BOX
History of Present Illness: The patient is a 69 year old female with a history of HTN, HL, anomalous coronary artery who presents with abdominal pain. She states she developed this morning upper abdominal pain with some radiation to the epigastric region and bandlike across the upper abdomen to the back. No chest pain or shortness of breath. She states that she had extensive cardiac work-up in the past including coronary angiography which revealed an anomalous coronary artery (details unknown) but otherwise no obstructive CAD.    Past Medical/Surgical History:  HTN, HL, anomalous coronary artery    Medications:  Home Medications:  acetaminophen 500 mg oral tablet: 2 tab(s) orally every 8 hours (26 Mar 2024 11:30)  aspirin 81 mg oral tablet: 1 tab(s) orally once a day resume AFTER twice daily dosing is completed (26 Mar 2024 11:30)  Lipitor 20 mg oral tablet: 1 tab(s) orally once a day (at bedtime) (25 Mar 2024 11:47)  metoprolol succinate 25 mg oral capsule, extended release: 1 cap(s) orally once a day (26 Mar 2024 11:30)  Pepcid 20 mg oral tablet: 1 tab(s) orally once a day (25 Mar 2024 11:47)  polyethylene glycol 3350 oral powder for reconstitution: 17 gram(s) orally once a day (at bedtime) (26 Mar 2024 11:30)  senna leaf extract oral tablet: 2 tab(s) orally once a day (at bedtime) (26 Mar 2024 11:30)  sulfaSALAzine 500 mg oral tablet: 2 tab(s) orally 2 times a day (25 Mar 2024 11:47)  valsartan-hydrochlorothiazide 80 mg-12.5 mg oral tablet: 1 tab(s) orally once a day (25 Mar 2024 11:47)  Xanax 0.5 mg oral tablet: 1 tab(s) orally once a day as needed for  anxiety (25 Mar 2024 11:47)      Family History: Non-contributory family history of premature cardiovascular atherosclerotic disease    Social History: No tobacco, alcohol or drug use    Review of Systems:  General: No fevers, chills, weight gain  Skin: No rashes, color changes  Cardiovascular: No chest pain, orthopnea  Respiratory: No shortness of breath, cough  Gastrointestinal: No nausea, +abdominal pain  Genitourinary: No incontinence, pain with urination  Musculoskeletal: No pain, swelling, decreased range of motion  Neurological: No headache, weakness  Psychiatric: No depression, anxiety  Endocrine: No weight gain, increased thirst  All other systems are comprehensively negative.    Physical Exam:  Vitals:        Vital Signs Last 24 Hrs  T(C): 36.6 (17 Jan 2025 12:00), Max: 37 (17 Jan 2025 07:54)  T(F): 97.9 (17 Jan 2025 12:00), Max: 98.6 (17 Jan 2025 07:54)  HR: 68 (17 Jan 2025 12:00) (68 - 93)  BP: 130/78 (17 Jan 2025 12:00) (130/78 - 183/73)  BP(mean): --  RR: 16 (17 Jan 2025 12:00) (15 - 16)  SpO2: 99% (17 Jan 2025 12:00) (99% - 99%)    Parameters below as of 17 Jan 2025 12:00  Patient On (Oxygen Delivery Method): room air      General: NAD  HEENT: MMM  Neck: No JVD, no carotid bruit  Lungs: CTAB  CV: RRR, nl S1/S2, no M/R/G  Abdomen: S/NT/ND, +BS  Extremities: No LE edema, no cyanosis  Neuro: AAOx3, non-focal  Skin: No rash    Labs:                        15.0   7.84  )-----------( 216      ( 17 Jan 2025 08:49 )             44.7     01-17    139  |  100  |  20  ----------------------------<  141[H]  3.2[L]   |  27  |  0.87    Ca    9.9      17 Jan 2025 08:49    TPro  7.4  /  Alb  4.0  /  TBili  1.5[H]  /  DBili  x   /  AST  24  /  ALT  27  /  AlkPhos  61  01-17            ECG/Telemetry: NSR, normal axis, no ST abnormality    
TIESHA SPENCE 69y  MRN-[id number]      THIS IS A 68 YO FEMALE WITH SIGNIFICANT PAST MEDICAL HISTORY OF HTN, HYPERLIPIDEMIA, OBESITY ON MOUNJARO LAST DOSE MONDAY, OA, GALLSTONES FOR THE PAST 20 YEARS, PRESENTED TO ER WITH RUQ NAD  EPIGASTRIC ABDOMINAL PAIN RADIATING TO CHEST AND BACK SINCE EARLY THIS MORNING. SHE STARTED HAVING PAIN WHILE BRUSHING HER TEETH THIS MORNING. SHE IS AWARE OF HAVING GALLSTONES SINCE HER PREGNANCIES AND NEVER HAD ANY BILIARY ATTACK.     SHE DENIES FEVER, CHILLS, FOOD INTOLERANCE, PYROSIS, H/O PUD / PANCREATITIS / HEPATITIS, ETOH/ DRUGS/ NSAIDs USE, RECENT TRAVEL. SOB, CHEST PAIN, ARM / JAW PAIN, CHANGE IN BOWEL HABITS.     SHE TOLERATED HER DINNER CHICKEN AND PASTA LAST NIGHT AND HAD NORMAL BM YESTERDAY. SHE HAS A NORMAL COLONOSCOPY AND EGD OVER 5 YEARS AGO. HER MOTHER HAD OPEN CHOLECYSTECTOMY.          PAST MEDICAL & SURGICAL HISTORY:  Arthritis   Hypertension   HLD   GALLSTONES   Osteoarthritis  Pancreatic insufficiency   Chronic low back pain  S/P hip replacement, bilateral   Status post total left knee replacement   S/P total knee replacement, right   S/P  section X4  S/p bilateral carpal tunnel release   S/P tonsillectomy     Home Medications:  acetaminophen 500 mg oral tablet: 2 tab(s) orally every 8 hours (26 Mar 2024 11:30)  aspirin 81 mg oral tablet: 1 tab(s) orally once a day resume AFTER twice daily dosing is completed (26 Mar 2024 11:30)  Lipitor 20 mg oral tablet: 1 tab(s) orally once a day (at bedtime) (25 Mar 2024 11:47)  metoprolol succinate 25 mg oral capsule, extended release: 1 cap(s) orally once a day (26 Mar 2024 11:30)  Pepcid 20 mg oral tablet: 1 tab(s) orally once a day (25 Mar 2024 11:47)  polyethylene glycol 3350 oral powder for reconstitution: 17 gram(s) orally once a day (at bedtime) (26 Mar 2024 11:30)  senna leaf extract oral tablet: 2 tab(s) orally once a day (at bedtime) (26 Mar 2024 11:30)  sulfaSALAzine 500 mg oral tablet: 2 tab(s) orally 2 times a day (25 Mar 2024 11:47)  valsartan-hydrochlorothiazide 80 mg-12.5 mg oral tablet: 1 tab(s) orally once a day (25 Mar 2024 11:47)  Xanax 0.5 mg oral tablet: 1 tab(s) orally once a day as needed for  anxiety (25 Mar 2024 11:47)  MOUNJARO EVERY MONDAY     Allergies  No Known Allergies    TOBACCO USE: EX-SMOKER, QUIT 40 YEARS AGO   ALCOHOL USE: DENIES  DRUG USE: DENIES      REVIEW OF SYSTEMS: SEE PAST MEDICAL, SURGICAL HISTORY AND HPI     CONSTITUTIONAL: No weakness, fevers or chills  EYES/ENT: No visual changes;  No vertigo or throat pain   NECK: No pain or stiffness  RESPIRATORY: No cough, wheezing, hemoptysis; No shortness of breath  CARDIOVASCULAR: No chest pain or palpitations  GASTROINTESTINAL: SEE HPI  GENITOURINARY: No dysuria, frequency or hematuria  NEUROLOGICAL: No numbness or weakness  SKIN: No itching, rashes    MEDICATIONS  (STANDING):  potassium chloride  10 mEq/100 mL IVPB 10 milliEquivalent(s) IV Intermittent once    Vital Signs (24 Hrs):  T(C): 37 (25 @ 07:54), Max: 37 (25 @ 07:54)  HR: 93 (25 @ 07:54) (93 - 93)  BP: 183/73 (25 @ 07:54) (183/73 - 183/73)  RR: 15 (25 @ 07:54) (15 - 15)  SpO2: 99% (25 @ 07:54) (99% - 99%)    PHYSICAL EXAM:    GENERAL: NAD  HEAD:  ATRAUMATIC, NORMOCEPHALIC  EYES: EOMI, PERRLA, CONJUNCTIVA AND SCLERA CLEAR AND ANICTERIC   ENT: MOIST MUCOUS MEMBRANE   NECK: SUPPLE, NO JVD  CHEST/LUNG: CLEAR TO AUSCULTATION, NO W/R/R  HEART: REGULAR RATE, RHYTHM, NO MURMUR, RUBS, GALLOPS  ABDOMEN: WELL HEALED LOWER MIDLINE  SCAR, OBESE, + BS, SOFT, NOT DISTENDED, NO PALPABLE MASS, RUQ AND EPIGASTRIC TENDERNESS ON DEEP PALPATION.  NO GUARDING/ RIGIDITY. NO CVA  TENDERNESS   EXTREMITIES:  2+ PERIPHERAL PULSES, BRISK CAPILLARY REFILL. NO CLUBBING, CYANOSIS, OR EDEMA.   NERVOUS SYSTEM: ALERT AND ORIENTED X3, CLEAR SPEECH . NO DEFICITS   MUSCULOSKELETAL : FROM ALL 4 EXTREMITIES, FULL AND EQUAL STRENGTH   SKIN: NO JAUNDICE                         15.0   7.84  )-----------( 216      ( 2025 08:49 )             44.7     2025 08:49    139    |  100    |  20     ----------------------------<  141    3.2     |  27     |  0.87     Ca    9.9        2025 08:49    TPro  7.4    /  Alb  4.0    /  TBili  1.5    /  DBili  x      /  AST  24     /  ALT  27     /  AlkPhos  61     2025 08:49    Urinalysis Basic - ( 2025 08:49 )  Color: x / Appearance: x / SG: x / pH: x  Gluc: 141 mg/dL / Ketone: x  / Bili: x / Urobili: x   Blood: x / Protein: x / Nitrite: x   Leuk Esterase: x / RBC: x / WBC x   Sq Epi: x / Non Sq Epi: x / Bacteria: x      ACC: [id number] EXAM:  US ABDOMEN COMPLETE   ORDERED BY: ANA MARIA VILLANUEVA   PROCEDURE DATE:  2025      INTERPRETATION:  CLINICAL INFORMATION: Abdominal pain.    COMPARISON: None available.    TECHNIQUE: Sonography of the abdomen.    FINDINGS:  Exam limited by patient's body habitus.    Liver:  Coarsened echotexture suggestive of hepatic steatosis.  The main portal vein is patent and there is normal direction of flow.    Bile ducts: The common bile duct is not adequately visualized.    Gallbladder:  Multiple mobile gallstones.  No gallbladder wall thickening or pericholecystic fluid noted.    Pancreas: Not adequately visualized.    Spleen: 8.8 cm. Within normal limits.    Right kidney: 9.7 cm. No hydronephrosis.    Left kidney: 10.3 cm. No hydronephrosis.    Ascites: None.    Aorta and IVC: Limited evaluation.    IMPRESSION:    Limited study.    Cholelithiasis.    Hepatic steatosis.    SUJATHA HOOKER MD; Attending Radiologist    ASSESSMENT AND PLAN     BILIARY COLIC  CHOLELITHIASIS  OBESITY   H/O HTN, HYPERLIPIDEMIA , OA     TRIAL OF PO INTAKE, IF TOLERATES MAY DISCHARGE  RECOMMEND LOW FAT DIET  FOLLOW UP WITH  PMD / SURGERY AS OUT PATIENT     PLAN DISCUSSED WITH PATIENT, DR. SABA    CASE DISCUSSED WITH DR. MAYFIELD

## 2025-01-17 NOTE — H&P ADULT - NSHPLABSRESULTS_GEN_ALL_CORE
yes Labs:                          15.0   7.84  )-----------( 216      ( 2025 08:49 )             44.7           139  |  100  |  20  ----------------------------<  141[H]  3.2[L]   |  27  |  0.87    Ca    9.9      2025 08:49    TPro  7.4  /  Alb  4.0  /  TBili  1.5[H]  /  DBili  x   /  AST  24  /  ALT  27  /  AlkPhos  61                Urinalysis Basic - ( 2025 13:00 )    Color: Dark Yellow / Appearance: Clear / S.028 / pH: x  Gluc: x / Ketone: Negative mg/dL  / Bili: Small / Urobili: 0.2 mg/dL   Blood: x / Protein: 30 mg/dL / Nitrite: Negative   Leuk Esterase: Trace / RBC: 1 /HPF / WBC 5 /HPF   Sq Epi: x / Non Sq Epi: x / Bacteria: Few /HPF        CAPILLARY BLOOD GLUCOSE      EKG:   Personally Reviewed:  [ x ] YES     Imaging:   Personally Reviewed:  [ x] YES

## 2025-01-17 NOTE — DISCHARGE NOTE NURSING/CASE MANAGEMENT/SOCIAL WORK - PATIENT PORTAL LINK FT
You can access the FollowMyHealth Patient Portal offered by [facility] by registering at the following website: [email or web address]. By joining [facility]’s FollowMyHealth portal, you will also be able to view your health information using other applications (apps) compatible with our system.

## 2025-01-17 NOTE — ED PROVIDER NOTE - CARE PLAN
1 Principal Discharge DX:	Cholelithiasis  Secondary Diagnosis:	Hypokalemia   Principal Discharge DX:	Cholelithiasis  Secondary Diagnosis:	Hypokalemia  Secondary Diagnosis:	Elevated troponin

## 2025-01-17 NOTE — DISCHARGE NOTE NURSING/CASE MANAGEMENT/SOCIAL WORK - FINANCIAL ASSISTANCE
[facility] provides services at a reduced cost to those who are determined to be eligible through [facility]’s financial assistance program. Information regarding [facility]’s financial assistance program can be found by going to [email or web address] or by calling [phone].

## 2025-01-17 NOTE — DISCHARGE NOTE PROVIDER - NSDCMRMEDTOKEN_GEN_ALL_CORE_FT
Aspirin EC 81 mg oral delayed release tablet: 1 tab(s) orally every 12 hours take at least 2 hours before celebrex/meloxicam  DULoxetine 30 mg oral delayed release capsule: 1 cap(s) orally once a day  Lipitor 40 mg oral tablet: 1 tab(s) orally once a day (at bedtime)  meloxicam 15 mg oral tablet: 1 tab(s) orally once a day Take at least 2 hours after aspirin  metoprolol succinate 50 mg oral capsule, extended release: 1 cap(s) orally  Mounjaro 10 mg/0.5 mL subcutaneous solution: 10 milligram(s) subcutaneously once a week on Mondays  Pepcid 20 mg oral tablet: 1 tab(s) orally once a day  sulfaSALAzine 500 mg oral tablet: 2 tab(s) orally 2 times a day  valsartan-hydrochlorothiazide 80 mg-12.5 mg oral tablet: 1 tab(s) orally once a day

## 2025-01-17 NOTE — ED PROVIDER NOTE - OBJECTIVE STATEMENT
Patient complaining of right upper quadrant abdominal pain wrapping around her upper abdomen radiating into her chest and back.  Patient relates pain began this morning while brushing her teeth did not eat yet this morning.  Pain waxing and waning intensity.  No fevers chills chest pain short of breath cough vomiting diarrhea dysuria hematuria frequency.  PMD Tonya

## 2025-01-17 NOTE — CONSULT NOTE ADULT - CONSULT REQUESTED DATE/TIME
17-Jan-2025 Complete translocation of Klebsiella in lung, blood and urine. UCx 3/14: Klebsiella ESBL and Proteus mirabilis. BCx 3/14-19: NGTD. Sputum Cx 3/15: Proteus Mirabilis, Klebsiella pneumonia ESBL. UA 3/19: + small LE, bacteria and WBC. BCx 3/19: Klebsiella pneumonia. BCx 3/20: NGTD  - s/p ceftriaxone 2g q24 hrs and also Ertapenem 1 gram daily   - c/w meropenem 1 gram q8 hrs  - ID consulted, appreciate recs  - continue GOC discussion as pt will require gallium scan, paracentesis, TTE for further evaluation Klebsiella found in lung, blood and urine. UCx 3/14: Klebsiella ESBL and Proteus mirabilis. BCx 3/14-19: NGTD. Sputum Cx 3/15: Proteus Mirabilis, Klebsiella pneumonia ESBL. UA 3/19: + small LE, bacteria and WBC. BCx 3/19: Klebsiella pneumonia. BCx 3/20: NGTD  - c/w meropenem 1 gram q8 hrs  - ID consulted, appreciate recs  - continue GOC discussion as pt will require gallium scan, paracentesis, TTE for further evaluation

## 2025-01-17 NOTE — ED PROVIDER NOTE - ABDOMINAL TENDER
Chronic Hepatitis B    Dyslipidemia    Endometriosis    GERD (gastroesophageal reflux disease)    History of Nephrotic Syndrome    HTN (hypertension)    Membranous Glomerulonephropathy    Obesity, Class III, BMI 40-49.9 (morbid obesity)    
right upper quadrant/epigastric

## 2025-01-17 NOTE — DISCHARGE NOTE PROVIDER - NSDCCPCAREPLAN_GEN_ALL_CORE_FT
PRINCIPAL DISCHARGE DIAGNOSIS  Diagnosis: Cholelithiasis  Assessment and Plan of Treatment:       SECONDARY DISCHARGE DIAGNOSES  Diagnosis: Hypokalemia  Assessment and Plan of Treatment:     Diagnosis: Elevated troponin  Assessment and Plan of Treatment:

## 2025-01-17 NOTE — CONSULT NOTE ADULT - ASSESSMENT
The patient is a 69 year old female with a history of HTN, HL, anomalous coronary artery who presents with abdominal pain.    Plan:  - Symptoms are atypical and likely more consistent with a GI etiology  - ECG with sinus rhythm and no evidence of ischemia/infarction  - An acute MI is ruled out with two sets of cardiac enzymes. Due to mild trend up, a repeat third set is pending.  - Abdominal ultrasound with gallstones  - Surgery eval noted  - No further inpatient cardiac work-up indicated at this time. Outpatient cardiology follow-up. The patient is a 69 year old female with a history of HTN, HL, anomalous coronary artery who presents with abdominal pain.    Plan:  - Symptoms are atypical and likely more consistent with a GI etiology  - ECG with sinus rhythm and no evidence of ischemia/infarction  - An acute MI is ruled out with two sets of cardiac enzymes. Due to mild trend up, a repeat third set is pending.  - Abdominal ultrasound with gallstones  - Surgery eval noted  - No further inpatient cardiac work-up indicated at this time. Outpatient cardiology follow-up.    ADDENDUM:  - Troponin with insignificant increase to 60. Symptoms remain consistent with GI etiology.  - No further inpatient cardiac work-up needed

## 2025-01-23 ENCOUNTER — NON-APPOINTMENT (OUTPATIENT)
Age: 70
End: 2025-01-23

## 2025-01-23 ENCOUNTER — APPOINTMENT (OUTPATIENT)
Dept: INTERNAL MEDICINE | Facility: CLINIC | Age: 70
End: 2025-01-23
Payer: MEDICARE

## 2025-01-23 VITALS
DIASTOLIC BLOOD PRESSURE: 74 MMHG | SYSTOLIC BLOOD PRESSURE: 122 MMHG | WEIGHT: 222 LBS | BODY MASS INDEX: 43.59 KG/M2 | OXYGEN SATURATION: 96 % | HEART RATE: 82 BPM | HEIGHT: 60 IN | RESPIRATION RATE: 16 BRPM | TEMPERATURE: 98.2 F

## 2025-01-23 DIAGNOSIS — I10 ESSENTIAL (PRIMARY) HYPERTENSION: ICD-10-CM

## 2025-01-23 DIAGNOSIS — K80.50 CALCULUS OF BILE DUCT W/OUT CHOLANGITIS OR CHOLECYSTITIS W/OUT OBSTRUCTION: ICD-10-CM

## 2025-01-23 PROCEDURE — G2211 COMPLEX E/M VISIT ADD ON: CPT

## 2025-01-23 PROCEDURE — 99214 OFFICE O/P EST MOD 30 MIN: CPT

## 2025-01-23 RX ORDER — DULOXETINE HYDROCHLORIDE 30 MG/1
30 CAPSULE, DELAYED RELEASE ORAL
Refills: 0 | Status: ACTIVE | COMMUNITY

## 2025-01-24 LAB
ANION GAP SERPL CALC-SCNC: 14 MMOL/L
BUN SERPL-MCNC: 21 MG/DL
CALCIUM SERPL-MCNC: 10.4 MG/DL
CHLORIDE SERPL-SCNC: 99 MMOL/L
CO2 SERPL-SCNC: 25 MMOL/L
CREAT SERPL-MCNC: 0.7 MG/DL
EGFR: 92 ML/MIN/1.73M2
GLUCOSE SERPL-MCNC: 91 MG/DL
POTASSIUM SERPL-SCNC: 4 MMOL/L
SODIUM SERPL-SCNC: 138 MMOL/L

## 2025-03-03 ENCOUNTER — OUTPATIENT (OUTPATIENT)
Dept: OUTPATIENT SERVICES | Facility: HOSPITAL | Age: 70
LOS: 1 days | End: 2025-03-03
Payer: MEDICARE

## 2025-03-03 VITALS
SYSTOLIC BLOOD PRESSURE: 106 MMHG | WEIGHT: 212.97 LBS | RESPIRATION RATE: 18 BRPM | HEART RATE: 77 BPM | HEIGHT: 60 IN | OXYGEN SATURATION: 98 % | TEMPERATURE: 98 F | DIASTOLIC BLOOD PRESSURE: 68 MMHG

## 2025-03-03 DIAGNOSIS — Z96.643 PRESENCE OF ARTIFICIAL HIP JOINT, BILATERAL: Chronic | ICD-10-CM

## 2025-03-03 DIAGNOSIS — T84.013A BROKEN INTERNAL LEFT KNEE PROSTHESIS, INITIAL ENCOUNTER: ICD-10-CM

## 2025-03-03 DIAGNOSIS — Z01.818 ENCOUNTER FOR OTHER PREPROCEDURAL EXAMINATION: ICD-10-CM

## 2025-03-03 DIAGNOSIS — Z98.891 HISTORY OF UTERINE SCAR FROM PREVIOUS SURGERY: Chronic | ICD-10-CM

## 2025-03-03 DIAGNOSIS — Z96.652 PRESENCE OF LEFT ARTIFICIAL KNEE JOINT: Chronic | ICD-10-CM

## 2025-03-03 DIAGNOSIS — Z96.641 PRESENCE OF RIGHT ARTIFICIAL HIP JOINT: Chronic | ICD-10-CM

## 2025-03-03 DIAGNOSIS — Z96.651 PRESENCE OF RIGHT ARTIFICIAL KNEE JOINT: Chronic | ICD-10-CM

## 2025-03-03 DIAGNOSIS — Z90.89 ACQUIRED ABSENCE OF OTHER ORGANS: Chronic | ICD-10-CM

## 2025-03-03 DIAGNOSIS — Z98.890 OTHER SPECIFIED POSTPROCEDURAL STATES: Chronic | ICD-10-CM

## 2025-03-03 DIAGNOSIS — N84.0 POLYP OF CORPUS UTERI: Chronic | ICD-10-CM

## 2025-03-03 LAB
A1C WITH ESTIMATED AVERAGE GLUCOSE RESULT: 5.2 % — SIGNIFICANT CHANGE UP (ref 4–5.6)
ALBUMIN SERPL ELPH-MCNC: 4.1 G/DL — SIGNIFICANT CHANGE UP (ref 3.3–5)
ALP SERPL-CCNC: 65 U/L — SIGNIFICANT CHANGE UP (ref 30–120)
ALT FLD-CCNC: 19 U/L — SIGNIFICANT CHANGE UP (ref 10–60)
ANION GAP SERPL CALC-SCNC: 10 MMOL/L — SIGNIFICANT CHANGE UP (ref 5–17)
APTT BLD: 31.4 SEC — SIGNIFICANT CHANGE UP (ref 24.5–35.6)
AST SERPL-CCNC: 16 U/L — SIGNIFICANT CHANGE UP (ref 10–40)
BILIRUB SERPL-MCNC: 1.4 MG/DL — HIGH (ref 0.2–1.2)
BLD GP AB SCN SERPL QL: SIGNIFICANT CHANGE UP
BUN SERPL-MCNC: 23 MG/DL — SIGNIFICANT CHANGE UP (ref 7–23)
CALCIUM SERPL-MCNC: 9.7 MG/DL — SIGNIFICANT CHANGE UP (ref 8.4–10.5)
CHLORIDE SERPL-SCNC: 104 MMOL/L — SIGNIFICANT CHANGE UP (ref 96–108)
CO2 SERPL-SCNC: 28 MMOL/L — SIGNIFICANT CHANGE UP (ref 22–31)
CREAT SERPL-MCNC: 0.7 MG/DL — SIGNIFICANT CHANGE UP (ref 0.5–1.3)
EGFR: 92 ML/MIN/1.73M2 — SIGNIFICANT CHANGE UP
EGFR: 92 ML/MIN/1.73M2 — SIGNIFICANT CHANGE UP
ESTIMATED AVERAGE GLUCOSE: 103 MG/DL — SIGNIFICANT CHANGE UP (ref 68–114)
GLUCOSE SERPL-MCNC: 113 MG/DL — HIGH (ref 70–99)
HCT VFR BLD CALC: 43 % — SIGNIFICANT CHANGE UP (ref 34.5–45)
HGB BLD-MCNC: 14.1 G/DL — SIGNIFICANT CHANGE UP (ref 11.5–15.5)
INR BLD: 1.06 RATIO — SIGNIFICANT CHANGE UP (ref 0.85–1.16)
MCHC RBC-ENTMCNC: 30.3 PG — SIGNIFICANT CHANGE UP (ref 27–34)
MCHC RBC-ENTMCNC: 32.8 G/DL — SIGNIFICANT CHANGE UP (ref 32–36)
MCV RBC AUTO: 92.3 FL — SIGNIFICANT CHANGE UP (ref 80–100)
NRBC BLD AUTO-RTO: 0 /100 WBCS — SIGNIFICANT CHANGE UP (ref 0–0)
PLATELET # BLD AUTO: 178 K/UL — SIGNIFICANT CHANGE UP (ref 150–400)
POTASSIUM SERPL-MCNC: 4.3 MMOL/L — SIGNIFICANT CHANGE UP (ref 3.5–5.3)
POTASSIUM SERPL-SCNC: 4.3 MMOL/L — SIGNIFICANT CHANGE UP (ref 3.5–5.3)
PROT SERPL-MCNC: 7.4 G/DL — SIGNIFICANT CHANGE UP (ref 6–8.3)
PROTHROM AB SERPL-ACNC: 12.5 SEC — SIGNIFICANT CHANGE UP (ref 9.9–13.4)
RBC # BLD: 4.66 M/UL — SIGNIFICANT CHANGE UP (ref 3.8–5.2)
RBC # FLD: 13 % — SIGNIFICANT CHANGE UP (ref 10.3–14.5)
SODIUM SERPL-SCNC: 142 MMOL/L — SIGNIFICANT CHANGE UP (ref 135–145)
WBC # BLD: 7.67 K/UL — SIGNIFICANT CHANGE UP (ref 3.8–10.5)
WBC # FLD AUTO: 7.67 K/UL — SIGNIFICANT CHANGE UP (ref 3.8–10.5)

## 2025-03-03 PROCEDURE — 93010 ELECTROCARDIOGRAM REPORT: CPT

## 2025-03-03 PROCEDURE — 86077 PHYS BLOOD BANK SERV XMATCH: CPT

## 2025-03-03 NOTE — H&P PST ADULT - MUSCULOSKELETAL
left knee/no calf tenderness/decreased ROM due to pain details… left knee/no calf tenderness/decreased ROM due to pain/extremities exam

## 2025-03-03 NOTE — H&P PST ADULT - NSCAFFEAMTFREQ_GEN_ALL_CORE_SD
You can access the FollowMyHealth Patient Portal offered by [facility] by registering at the following website: [email or web address]. By joining [facility]’s FollowMyHealth portal, you will also be able to view your health information using other applications (apps) compatible with our system.
1-2 cups/cans per day

## 2025-03-03 NOTE — H&P PST ADULT - PROBLEM SELECTOR PLAN 1
68 y/o female with left knee pain  scheduled surgery: revision left total knee replacement  will obtain medical  and cardiology clearance  pre-op instructions provided  Patient provided with pre-operative instructions and verbalized understanding.    Patient will be NPO on day of surgery.   Patient will stop NSAIDs, aspirin, herbal supplements or vitamins 1 week prior to surgery.    Chlorhexidine wash and Gatorade  provided with instructions.  Mounjaro  last dose 03/01/2025, Instructed patient to hold prior to surgery 68 y/o female with left knee pain  scheduled surgery: revision left total knee replacement  will obtain medical  and cardiology clearance  pre-op instructions provided  Patient provided with pre-operative instructions and verbalized understanding.    Patient will be NPO on day of surgery.   Patient will stop NSAIDs, aspirin, herbal supplements or vitamins 1 week prior to surgery.    Chlorhexidine wash and Gatorade  provided with instructions.  Mounjaro  last dose 03/01/2025, Instructed patient to hold 2 weeks  prior to surgery

## 2025-03-03 NOTE — H&P PST ADULT - GASTROINTESTINAL
nontender/no organomegaly/no palpable matthew details… nontender/normal active bowel sounds/no organomegaly/no palpable matthew

## 2025-03-03 NOTE — H&P PST ADULT - COMMENTS
denies broken loose teeth, denies dentures  denies h/oDVT,PE  denies any current cold or flu like symptoms, including fever, cough, sinus congestion, body aches or chills colonoscopy-neg  2025

## 2025-03-03 NOTE — H&P PST ADULT - HISTORY OF PRESENT ILLNESS
this is a 68 y/o female who had left knee  replacement about 20 yrs ago, has been having pain on left knee. She  states that at times she may feel some stiffness with some mild distal difficulty ambulating. She is scheduled for Revision left total knee replacement on 3/17/2025 this is a 70 y/o female who had left knee replacement about 20 yrs ago, has been having pain on left knee. She  states that she feels some stiffness with difficulty ambulating. She complains 8/10 pain with ADL, ROM. She is scheduled for Revision left total knee replacement on 3/17/2025

## 2025-03-03 NOTE — H&P PST ADULT - NSICDXPASTMEDICALHX_GEN_ALL_CORE_FT
PAST MEDICAL HISTORY:  Anxiety     Arthritis     Chronic low back pain     GERD (gastroesophageal reflux disease)     H/O hypokalemia     H/O rheumatoid arthritis     HLD (hyperlipidemia)     Hypertension     Knee pain     Left knee pain     Mild depression     Obesity     Osteoarthritis     Palpitation     Panic attack

## 2025-03-03 NOTE — H&P PST ADULT - NSICDXPASTSURGICALHX_GEN_ALL_CORE_FT
PAST SURGICAL HISTORY:  H/O colonoscopy     H/O dilation and curettage     S/p bilateral carpal tunnel release     S/P  section     S/P hip replacement, bilateral     S/p revision of right total hip     S/P tonsillectomy     S/P total knee replacement, right     Status post total left knee replacement     Uterine polyp

## 2025-03-03 NOTE — H&P PST ADULT - NSANTHSNORERD_ENT_A_CORE
Received request for prescription refill for patient.  Patient follows with Dr. Richi Garcia, , [facility]     Request is for Eliquis  Is patient currently on medication- yes    Last OV- 8/1/24  Next OV- 2/6/25    Pended for signing and sent to provider.   
Yes

## 2025-03-03 NOTE — H&P PST ADULT - NSICDXPROCEDURE_GEN_ALL_CORE_FT
PROCEDURES:  Left total knee replacement 03-Mar-2025 10:14:00 Revision left total knee replacement Joslyn Ascencio

## 2025-03-04 ENCOUNTER — APPOINTMENT (OUTPATIENT)
Dept: INTERNAL MEDICINE | Facility: CLINIC | Age: 70
End: 2025-03-04
Payer: MEDICARE

## 2025-03-04 VITALS
HEART RATE: 100 BPM | TEMPERATURE: 98.3 F | OXYGEN SATURATION: 96 % | SYSTOLIC BLOOD PRESSURE: 120 MMHG | WEIGHT: 214 LBS | HEIGHT: 60 IN | BODY MASS INDEX: 42.01 KG/M2 | RESPIRATION RATE: 16 BRPM | DIASTOLIC BLOOD PRESSURE: 62 MMHG

## 2025-03-04 DIAGNOSIS — Z01.818 ENCOUNTER FOR OTHER PREPROCEDURAL EXAMINATION: ICD-10-CM

## 2025-03-04 PROBLEM — M25.562 PAIN IN LEFT KNEE: Chronic | Status: ACTIVE | Noted: 2025-03-03

## 2025-03-04 PROBLEM — R00.2 PALPITATIONS: Chronic | Status: ACTIVE | Noted: 2025-03-03

## 2025-03-04 PROBLEM — Z86.39 PERSONAL HISTORY OF OTHER ENDOCRINE, NUTRITIONAL AND METABOLIC DISEASE: Chronic | Status: ACTIVE | Noted: 2025-03-03

## 2025-03-04 PROBLEM — Z87.39 PERSONAL HISTORY OF OTHER DISEASES OF THE MUSCULOSKELETAL SYSTEM AND CONNECTIVE TISSUE: Chronic | Status: ACTIVE | Noted: 2025-03-03

## 2025-03-04 PROBLEM — F32.A DEPRESSION, UNSPECIFIED: Chronic | Status: ACTIVE | Noted: 2025-03-03

## 2025-03-04 PROBLEM — F41.0 PANIC DISORDER [EPISODIC PAROXYSMAL ANXIETY]: Chronic | Status: ACTIVE | Noted: 2025-03-03

## 2025-03-04 PROBLEM — E66.9 OBESITY, UNSPECIFIED: Chronic | Status: ACTIVE | Noted: 2025-03-03

## 2025-03-04 LAB
ALLERGY+IMMUNOLOGY DIAG STUDY NOTE: SIGNIFICANT CHANGE UP
ALLERGY+IMMUNOLOGY DIAG STUDY NOTE: SIGNIFICANT CHANGE UP
DIR ANTIGLOB POLYSPECIFIC INTERPRETATION: SIGNIFICANT CHANGE UP
MRSA PCR RESULT.: SIGNIFICANT CHANGE UP
S AUREUS DNA NOSE QL NAA+PROBE: SIGNIFICANT CHANGE UP

## 2025-03-04 PROCEDURE — 99213 OFFICE O/P EST LOW 20 MIN: CPT

## 2025-03-04 PROCEDURE — G2211 COMPLEX E/M VISIT ADD ON: CPT

## 2025-03-04 RX ORDER — VALSARTAN 40 MG/1
40 TABLET, COATED ORAL
Refills: 0 | Status: ACTIVE | COMMUNITY

## 2025-03-04 RX ORDER — TIRZEPATIDE 7.5 MG/.5ML
7.5 INJECTION, SOLUTION SUBCUTANEOUS
Qty: 1 | Refills: 0 | Status: ACTIVE | COMMUNITY
Start: 2025-03-04 | End: 1900-01-01

## 2025-03-05 PROCEDURE — 85610 PROTHROMBIN TIME: CPT

## 2025-03-05 PROCEDURE — 86870 RBC ANTIBODY IDENTIFICATION: CPT

## 2025-03-05 PROCEDURE — 86850 RBC ANTIBODY SCREEN: CPT

## 2025-03-05 PROCEDURE — 86880 COOMBS TEST DIRECT: CPT

## 2025-03-05 PROCEDURE — 86900 BLOOD TYPING SEROLOGIC ABO: CPT

## 2025-03-05 PROCEDURE — 85730 THROMBOPLASTIN TIME PARTIAL: CPT

## 2025-03-05 PROCEDURE — 93005 ELECTROCARDIOGRAM TRACING: CPT

## 2025-03-05 PROCEDURE — 87640 STAPH A DNA AMP PROBE: CPT

## 2025-03-05 PROCEDURE — 86902 BLOOD TYPE ANTIGEN DONOR EA: CPT

## 2025-03-05 PROCEDURE — G0463: CPT

## 2025-03-05 PROCEDURE — 85027 COMPLETE CBC AUTOMATED: CPT

## 2025-03-05 PROCEDURE — 36415 COLL VENOUS BLD VENIPUNCTURE: CPT

## 2025-03-05 PROCEDURE — 83036 HEMOGLOBIN GLYCOSYLATED A1C: CPT

## 2025-03-05 PROCEDURE — 87641 MR-STAPH DNA AMP PROBE: CPT

## 2025-03-05 PROCEDURE — 80053 COMPREHEN METABOLIC PANEL: CPT

## 2025-03-05 PROCEDURE — 86901 BLOOD TYPING SEROLOGIC RH(D): CPT

## 2025-03-17 ENCOUNTER — INPATIENT (INPATIENT)
Facility: HOSPITAL | Age: 70
LOS: 1 days | Discharge: HOME | DRG: 561 | End: 2025-03-19
Attending: ORTHOPAEDIC SURGERY | Admitting: ORTHOPAEDIC SURGERY
Payer: MEDICARE

## 2025-03-17 ENCOUNTER — APPOINTMENT (OUTPATIENT)
Dept: ORTHOPEDIC SURGERY | Facility: HOSPITAL | Age: 70
End: 2025-03-17

## 2025-03-17 ENCOUNTER — TRANSCRIPTION ENCOUNTER (OUTPATIENT)
Age: 70
End: 2025-03-17

## 2025-03-17 ENCOUNTER — RESULT REVIEW (OUTPATIENT)
Age: 70
End: 2025-03-17

## 2025-03-17 VITALS
TEMPERATURE: 97 F | DIASTOLIC BLOOD PRESSURE: 73 MMHG | HEIGHT: 60 IN | OXYGEN SATURATION: 99 % | WEIGHT: 214.29 LBS | HEART RATE: 69 BPM | SYSTOLIC BLOOD PRESSURE: 125 MMHG | RESPIRATION RATE: 17 BRPM

## 2025-03-17 DIAGNOSIS — Z96.652 PRESENCE OF LEFT ARTIFICIAL KNEE JOINT: Chronic | ICD-10-CM

## 2025-03-17 DIAGNOSIS — Z98.891 HISTORY OF UTERINE SCAR FROM PREVIOUS SURGERY: Chronic | ICD-10-CM

## 2025-03-17 DIAGNOSIS — Z96.643 PRESENCE OF ARTIFICIAL HIP JOINT, BILATERAL: Chronic | ICD-10-CM

## 2025-03-17 DIAGNOSIS — Z98.890 OTHER SPECIFIED POSTPROCEDURAL STATES: Chronic | ICD-10-CM

## 2025-03-17 DIAGNOSIS — Z96.651 PRESENCE OF RIGHT ARTIFICIAL KNEE JOINT: Chronic | ICD-10-CM

## 2025-03-17 DIAGNOSIS — N84.0 POLYP OF CORPUS UTERI: Chronic | ICD-10-CM

## 2025-03-17 DIAGNOSIS — T84.013A BROKEN INTERNAL LEFT KNEE PROSTHESIS, INITIAL ENCOUNTER: ICD-10-CM

## 2025-03-17 DIAGNOSIS — Z96.641 PRESENCE OF RIGHT ARTIFICIAL HIP JOINT: Chronic | ICD-10-CM

## 2025-03-17 DIAGNOSIS — Z90.89 ACQUIRED ABSENCE OF OTHER ORGANS: Chronic | ICD-10-CM

## 2025-03-17 LAB — BLD GP AB SCN SERPL QL: SIGNIFICANT CHANGE UP

## 2025-03-17 PROCEDURE — 27487 REVISE/REPLACE KNEE JOINT: CPT | Mod: LT

## 2025-03-17 PROCEDURE — 88300 SURGICAL PATH GROSS: CPT | Mod: 26

## 2025-03-17 PROCEDURE — 88311 DECALCIFY TISSUE: CPT | Mod: 26

## 2025-03-17 PROCEDURE — 99221 1ST HOSP IP/OBS SF/LOW 40: CPT

## 2025-03-17 PROCEDURE — 88305 TISSUE EXAM BY PATHOLOGIST: CPT | Mod: 26

## 2025-03-17 PROCEDURE — 73560 X-RAY EXAM OF KNEE 1 OR 2: CPT | Mod: 26,LT

## 2025-03-17 DEVICE — IMPLANTABLE DEVICE: Type: IMPLANTABLE DEVICE | Site: LEFT | Status: FUNCTIONAL

## 2025-03-17 DEVICE — COMP TIB PSN REV FIXED KEEL SZ D LT: Type: IMPLANTABLE DEVICE | Site: LEFT | Status: FUNCTIONAL

## 2025-03-17 DEVICE — SCREW HEX HEADED 3.5X48MM: Type: IMPLANTABLE DEVICE | Site: LEFT | Status: FUNCTIONAL

## 2025-03-17 DEVICE — BONE WAX 2.5GM: Type: IMPLANTABLE DEVICE | Site: LEFT | Status: FUNCTIONAL

## 2025-03-17 DEVICE — BONE FILLER BIOCOMPOSITE STIMULAN RAPID CURE 10CC: Type: IMPLANTABLE DEVICE | Site: LEFT | Status: FUNCTIONAL

## 2025-03-17 DEVICE — SURF ART PSN REV 6MM OFFSET STEM EXT 12X135MM: Type: IMPLANTABLE DEVICE | Site: LEFT | Status: FUNCTIONAL

## 2025-03-17 DEVICE — KIT PREP BONE BIO-PREP: Type: IMPLANTABLE DEVICE | Site: LEFT | Status: FUNCTIONAL

## 2025-03-17 DEVICE — SURF ART TIB PSN REV TM SZ SM: Type: IMPLANTABLE DEVICE | Site: LEFT | Status: FUNCTIONAL

## 2025-03-17 DEVICE — SURF ART PSN REV TAPERED SMOOTH STEM EXT 14X75MM: Type: IMPLANTABLE DEVICE | Site: LEFT | Status: FUNCTIONAL

## 2025-03-17 DEVICE — ZIMMER/NEXGEN SMOOTH PIN 3.2X75MM: Type: IMPLANTABLE DEVICE | Site: LEFT | Status: FUNCTIONAL

## 2025-03-17 DEVICE — COMP FEM PSN REV CEM CCR STD SZ 5 LT: Type: IMPLANTABLE DEVICE | Site: LEFT | Status: FUNCTIONAL

## 2025-03-17 RX ORDER — CELECOXIB 50 MG/1
200 CAPSULE ORAL EVERY 12 HOURS
Refills: 0 | Status: DISCONTINUED | OUTPATIENT
Start: 2025-03-17 | End: 2025-03-19

## 2025-03-17 RX ORDER — CEFAZOLIN SODIUM IN 0.9 % NACL 3 G/100 ML
2000 INTRAVENOUS SOLUTION, PIGGYBACK (ML) INTRAVENOUS ONCE
Refills: 0 | Status: COMPLETED | OUTPATIENT
Start: 2025-03-17 | End: 2025-03-17

## 2025-03-17 RX ORDER — DULOXETINE 20 MG/1
30 CAPSULE, DELAYED RELEASE ORAL DAILY
Refills: 0 | Status: DISCONTINUED | OUTPATIENT
Start: 2025-03-17 | End: 2025-03-19

## 2025-03-17 RX ORDER — ACETAMINOPHEN 500 MG/5ML
1000 LIQUID (ML) ORAL ONCE
Refills: 0 | Status: COMPLETED | OUTPATIENT
Start: 2025-03-17 | End: 2025-03-17

## 2025-03-17 RX ORDER — SENNA 187 MG
2 TABLET ORAL AT BEDTIME
Refills: 0 | Status: DISCONTINUED | OUTPATIENT
Start: 2025-03-17 | End: 2025-03-19

## 2025-03-17 RX ORDER — HYDROMORPHONE/SOD CHLOR,ISO/PF 2 MG/10 ML
0.5 SYRINGE (ML) INJECTION
Refills: 0 | Status: DISCONTINUED | OUTPATIENT
Start: 2025-03-17 | End: 2025-03-19

## 2025-03-17 RX ORDER — OXYCODONE HYDROCHLORIDE 30 MG/1
5 TABLET ORAL
Refills: 0 | Status: DISCONTINUED | OUTPATIENT
Start: 2025-03-17 | End: 2025-03-18

## 2025-03-17 RX ORDER — MAGNESIUM HYDROXIDE 400 MG/5ML
30 SUSPENSION ORAL DAILY
Refills: 0 | Status: DISCONTINUED | OUTPATIENT
Start: 2025-03-17 | End: 2025-03-19

## 2025-03-17 RX ORDER — TRANEXAMIC ACID 1000 MG/10
1000 AMPUL (ML) INTRAVENOUS ONCE
Refills: 0 | Status: COMPLETED | OUTPATIENT
Start: 2025-03-17 | End: 2025-03-17

## 2025-03-17 RX ORDER — CEFAZOLIN SODIUM IN 0.9 % NACL 3 G/100 ML
2000 INTRAVENOUS SOLUTION, PIGGYBACK (ML) INTRAVENOUS EVERY 8 HOURS
Refills: 0 | Status: COMPLETED | OUTPATIENT
Start: 2025-03-18 | End: 2025-03-18

## 2025-03-17 RX ORDER — HYDROMORPHONE/SOD CHLOR,ISO/PF 2 MG/10 ML
0.5 SYRINGE (ML) INJECTION
Refills: 0 | Status: DISCONTINUED | OUTPATIENT
Start: 2025-03-17 | End: 2025-03-17

## 2025-03-17 RX ORDER — APIXABAN 2.5 MG/1
2.5 TABLET, FILM COATED ORAL EVERY 12 HOURS
Refills: 0 | Status: DISCONTINUED | OUTPATIENT
Start: 2025-03-18 | End: 2025-03-19

## 2025-03-17 RX ORDER — SULFASALAZINE 500 MG/1
1000 TABLET ORAL
Refills: 0 | Status: DISCONTINUED | OUTPATIENT
Start: 2025-03-17 | End: 2025-03-19

## 2025-03-17 RX ORDER — SODIUM CHLORIDE 9 G/1000ML
1000 INJECTION, SOLUTION INTRAVENOUS
Refills: 0 | Status: DISCONTINUED | OUTPATIENT
Start: 2025-03-17 | End: 2025-03-19

## 2025-03-17 RX ORDER — ALPRAZOLAM 0.5 MG
1 TABLET, EXTENDED RELEASE 24 HR ORAL
Refills: 0 | DISCHARGE

## 2025-03-17 RX ORDER — ONDANSETRON HCL/PF 4 MG/2 ML
4 VIAL (ML) INJECTION EVERY 6 HOURS
Refills: 0 | Status: DISCONTINUED | OUTPATIENT
Start: 2025-03-17 | End: 2025-03-19

## 2025-03-17 RX ORDER — ACETAMINOPHEN 500 MG/5ML
1000 LIQUID (ML) ORAL ONCE
Refills: 0 | Status: COMPLETED | OUTPATIENT
Start: 2025-03-18 | End: 2025-03-18

## 2025-03-17 RX ORDER — OXYCODONE HYDROCHLORIDE 30 MG/1
10 TABLET ORAL
Refills: 0 | Status: DISCONTINUED | OUTPATIENT
Start: 2025-03-17 | End: 2025-03-18

## 2025-03-17 RX ORDER — DEXAMETHASONE 0.5 MG/1
8 TABLET ORAL ONCE
Refills: 0 | Status: COMPLETED | OUTPATIENT
Start: 2025-03-18 | End: 2025-03-18

## 2025-03-17 RX ORDER — APREPITANT 40 MG/1
40 CAPSULE ORAL ONCE
Refills: 0 | Status: COMPLETED | OUTPATIENT
Start: 2025-03-17 | End: 2025-03-17

## 2025-03-17 RX ORDER — CYCLOSPORINE OPHTHALMIC SOLUTION 1 MG/ML
1 SOLUTION/ DROPS OPHTHALMIC
Refills: 0 | DISCHARGE

## 2025-03-17 RX ORDER — HYDROMORPHONE/SOD CHLOR,ISO/PF 2 MG/10 ML
0.2 SYRINGE (ML) INJECTION
Refills: 0 | Status: DISCONTINUED | OUTPATIENT
Start: 2025-03-17 | End: 2025-03-17

## 2025-03-17 RX ORDER — ATORVASTATIN CALCIUM 80 MG/1
40 TABLET, FILM COATED ORAL AT BEDTIME
Refills: 0 | Status: DISCONTINUED | OUTPATIENT
Start: 2025-03-17 | End: 2025-03-19

## 2025-03-17 RX ORDER — POLYETHYLENE GLYCOL 3350 17 G/17G
17 POWDER, FOR SOLUTION ORAL AT BEDTIME
Refills: 0 | Status: DISCONTINUED | OUTPATIENT
Start: 2025-03-17 | End: 2025-03-19

## 2025-03-17 RX ORDER — ACETAMINOPHEN 500 MG/5ML
1000 LIQUID (ML) ORAL EVERY 8 HOURS
Refills: 0 | Status: DISCONTINUED | OUTPATIENT
Start: 2025-03-18 | End: 2025-03-19

## 2025-03-17 RX ORDER — SODIUM CHLORIDE 9 G/1000ML
1000 INJECTION, SOLUTION INTRAVENOUS
Refills: 0 | Status: DISCONTINUED | OUTPATIENT
Start: 2025-03-17 | End: 2025-03-17

## 2025-03-17 RX ORDER — METOPROLOL SUCCINATE 50 MG/1
50 TABLET, EXTENDED RELEASE ORAL DAILY
Refills: 0 | Status: DISCONTINUED | OUTPATIENT
Start: 2025-03-17 | End: 2025-03-19

## 2025-03-17 RX ORDER — ONDANSETRON HCL/PF 4 MG/2 ML
4 VIAL (ML) INJECTION ONCE
Refills: 0 | Status: DISCONTINUED | OUTPATIENT
Start: 2025-03-17 | End: 2025-03-17

## 2025-03-17 RX ORDER — ALPRAZOLAM 0.5 MG
0.5 TABLET, EXTENDED RELEASE 24 HR ORAL ONCE
Refills: 0 | Status: DISCONTINUED | OUTPATIENT
Start: 2025-03-17 | End: 2025-03-18

## 2025-03-17 RX ADMIN — SODIUM CHLORIDE 75 MILLILITER(S): 9 INJECTION, SOLUTION INTRAVENOUS at 19:58

## 2025-03-17 RX ADMIN — CELECOXIB 200 MILLIGRAM(S): 50 CAPSULE ORAL at 21:25

## 2025-03-17 RX ADMIN — Medication 500 MILLILITER(S): at 19:58

## 2025-03-17 RX ADMIN — Medication 0.5 MILLIGRAM(S): at 21:15

## 2025-03-17 RX ADMIN — Medication 100 MILLIGRAM(S): at 23:49

## 2025-03-17 RX ADMIN — Medication 0.5 MILLIGRAM(S): at 20:52

## 2025-03-17 RX ADMIN — CELECOXIB 200 MILLIGRAM(S): 50 CAPSULE ORAL at 21:38

## 2025-03-17 RX ADMIN — APREPITANT 40 MILLIGRAM(S): 40 CAPSULE ORAL at 12:50

## 2025-03-17 RX ADMIN — Medication 1 APPLICATION(S): at 12:49

## 2025-03-17 RX ADMIN — ATORVASTATIN CALCIUM 40 MILLIGRAM(S): 80 TABLET, FILM COATED ORAL at 21:25

## 2025-03-17 NOTE — BRIEF OPERATIVE NOTE - NSICDXBRIEFPROCEDURE_GEN_ALL_CORE_FT
PROCEDURES:  Revision of all components of total knee arthroplasty 17-Mar-2025 15:23:38  Madi Licona  
Stable

## 2025-03-17 NOTE — CONSULT NOTE ADULT - SUBJECTIVE AND OBJECTIVE BOX
69 year old female with a history of HTN, HL, depression,  obesity on Mounjaro, gallstone for past 20 years, anomalous coronary artery, rheumatoid arthritis on sulfasalazine who is currently s/p revision of Lt TKR POD # 0. pt appears drowsy and giving responses to only tactile stimuli. received general anesthesia  during procedure. pt VS are stable and appears nontoxic     Physical exam:   GA: NAD  HEENT: AT/NC  CV: RRR,Normal S1S2, No murmur, rubs, gallop  Respi: CTA B/L, No rhonchi, rales or wheezing  Abd: soft, NT/ND, Pos BS  Ext: Lt  LE ROM deferred due to pain. distal pulses palpated  Neuro: A and O X 3

## 2025-03-17 NOTE — DISCHARGE NOTE PROVIDER - NSDCCPCAREPLAN_GEN_ALL_CORE_FT
PRINCIPAL DISCHARGE DIAGNOSIS  Diagnosis: Failed total knee arthroplasty  Assessment and Plan of Treatment: Physical Therapy/Occupational Therapy for: ambulation, transfers, stairs, ADL's (activities of daily living), range of motion exercises, and isometrics  -Activity  • Weight Bearing as tolerated with rolling walker.  • Take short, frequent walks increasing the distance that you walk each day as tolerated.  • Change your position every hour to decrease pain and stiffness.  • Continue the exercises taught to you by your physical therapist.  • No driving until cleared by the doctor.  • No tub baths, hot tubs, or swimming pools until instructed by your doctor.  • Do not squat down on the floor.  • Do not kneel or twist your knee.  • Range of Motion Goals: Flexion= 120 degrees, Extension = 0 degrees  Keep incision clean and dry. May shower after surgery if no drainage from incision.  Suture/Prineo tape removal 2 weeks after surgery at Surgeon's office.   You have a ROLY negative pressure and water resistant dressing over your surgical wound and may shower.  Disconnect ROLY battery prior to showering, reconnect battery after showering and press orange button to resume ROLY power. Remove ROLY dressing 7 days after surgery or when the battery alarms and stops working. If drainage is noted from your wound, apply a dry sterile dressing and call your surgeon.     PRINCIPAL DISCHARGE DIAGNOSIS  Diagnosis: Failed total knee arthroplasty  Assessment and Plan of Treatment: Physical Therapy/Occupational Therapy for: ambulation, transfers, stairs, ADL's (activities of daily living), range of motion exercises, and isometrics  -Activity  • Weight Bearing as tolerated with rolling walker.  • Take short, frequent walks increasing the distance that you walk each day as tolerated.  • Change your position every hour to decrease pain and stiffness.  • Continue the exercises taught to you by your physical therapist.  • No driving until cleared by the doctor.  • No tub baths, hot tubs, or swimming pools until instructed by your doctor.  • Do not squat down on the floor.  • Do not kneel or twist your knee.  • Range of Motion Goals: Flexion= 120 degrees, Extension = 0 degrees  You have a ROLY negative pressure and water resistant dressing over your surgical wound and may shower.  Disconnect ROLY battery prior to showering, reconnect battery after showering and press orange button to resume ROLY power. Remove ROLY dressing 7 days after surgery or when the battery alarms and stops working. If drainage is noted from your wound, apply a dry sterile dressing and call your surgeon. Prineo was used for skin closure.  It is water resistant and may get slightly wet in the shower, but don't soak it as it may loosen.  Leave open to air.  Prineo will be removed in 2 weeks in surgeon's office  Cold pack on continously for 72 hrs.  Change cold insert every 4 hrs.  then use as needed for pain/swelling  Opioid safety : Use lowest effective dose.  Do not keep opioid in the medicine cabinet in bathroom or on kitchen counter where others may have access to it.  No sharing w/ others. Do not drive while taking opioid.  To dispose of it some pharmacies do have drop boxes as do police stations.  Do not flush or put in trash.

## 2025-03-17 NOTE — DISCHARGE NOTE PROVIDER - INSTRUCTIONS
For Constipation :   • Increase your water intake. Drink at least 8 glasses of water daily.  • Try adding fiber to your diet by eating fruits, vegetables and foods that are rich in grains.  • If you do experience constipation, you may take an over-the-counter stool softener/laxative such as Diya Colace, Senekot, miralax or  Milk of Magnesia.

## 2025-03-17 NOTE — PRE-OP CHECKLIST - ORDERS/MEDICATION ADMINISTRATION RECORD ON CHART
----- Message from Pacheco Sawyer MD sent at 7/30/2022 10:00 AM CDT -----  Prescribed antibiotics should provide coverage, no need for change in treatment at this time. Thanks!  ----- Message -----  From: Alessandra Denny RN  Sent: 7/30/2022   9:10 AM CDT  To: Dina Alejandre MD, Pacheco Sawyer MD      ----- Message -----  From: Lab, Background User  Sent: 7/28/2022   8:10 AM CDT  To: [facility] Result Pool        
Patient informed of culture results from ear. Advised to continue antibiotic as prescribed. Advised to make follow up appointment for ears in about a week. Verbalizes understanding and agreeable to plan of care.       
done

## 2025-03-17 NOTE — CONSULT NOTE ADULT - ASSESSMENT
s/p revision of Lt TKR POD # 0:  -DVT ppx per ortho  -pain to be assessed on PRN basis  -C/w bowel regiment  -post op PT/OT  -can resume diet     Other chronic condition:  HTN: C/w valsartan 40 mg qd   depression: C/w duloxetine 30 mg qd   HLD: C/w lipitor 40 mg qd   CAD w/o critical stenosis: C/w met succ 50 mg qd, lipitor 40 mg qd   rheumatoid arthritis: C/w sulfasalazine 1000 mg bid       Full code

## 2025-03-17 NOTE — DISCHARGE NOTE PROVIDER - HOSPITAL COURSE
This is 69y Female patient was admitted to [facility] with a history Failed total knee arthroplasty.  Patient went to Pre-Surgical Testing at [facility] and was medically cleared to undergo elective procedure.    The patient underwent a Revision of all components of total knee arthroplasty by Franklin Ibrara on 03-17-25.   No operative or jalyn-operative complications arose during patients hospital course.    Patient received antibiotic according to SCIP guidelines for infection prevention.   Eliquis was given for DVT prophylaxis.    Anesthesia, Medical Hospitalist, Physical Therapy and Occupational Therapy were consulted. Patient is stable for discharge with a good prognosis.  Appropriate discharge instructions and medications are provided in this document. This is 69y Female patient was admitted to [facility] with a history Failed total knee arthroplasty.  Patient went to Pre-Surgical Testing at [facility] and was medically cleared to undergo elective procedure.    The patient underwent a Revision of all components of total knee arthroplasty by Franklin Ibarra on 03-17-25.   No operative or jalyn-operative complications arose during patients hospital course.    Patient received antibiotic according to SCIP guidelines for infection prevention.   Eliquis was given for DVT prophylaxis.    Anesthesia, Medical Hospitalist, Physical Therapy and Occupational Therapy were consulted. Patient is stable for discharge with a good prognosis.  Appropriate discharge instructions and medications are provided in this document.     She was discharged to home w/ home PT and will follow up w/ DR Ibarra's office for further orthopedic care. This is 69y Female patient was admitted to [facility] with a history  Failed left total knee arthroplasty.  Patient went to Pre-Surgical Testing at [facility] and was medically cleared to undergo elective procedure.    The patient underwent a Revision of all components of left total knee arthroplasty by Franklin Ibarra on 03-17-25.   No operative or jalyn-operative complications arose during patients hospital course.    Patient received antibiotic according to SCIP guidelines for infection prevention.   Eliquis was given for DVT prophylaxis.    Anesthesia, Medical Hospitalist, Physical Therapy and Occupational Therapy were consulted. Patient is stable for discharge with a good prognosis.  Appropriate discharge instructions and medications are provided in this document.     She was discharged to home w/ home PT and will follow up w/ DR Ibarra's office for further orthopedic care.

## 2025-03-17 NOTE — DISCHARGE NOTE PROVIDER - DISCHARGE DIET
Bed: 015A  Expected date: 8/27/18  Expected time:   Means of arrival:   Comments:  Obdulia Galeana RN  08/27/18 9037 Regular Diet - No restrictions Regular Diet - No restrictions/Other Diet Instructions

## 2025-03-17 NOTE — DISCHARGE NOTE PROVIDER - NSDCMRMEDTOKEN_GEN_ALL_CORE_FT
ALPRAZolam 0.5 mg oral tablet: 1 tab(s) orally prn  aspirin 81 mg oral tablet: orally once a day  DULoxetine 30 mg oral delayed release capsule: 1 cap(s) orally once a day  Lipitor 40 mg oral tablet: 1 tab(s) orally once a day (at bedtime)  meloxicam 15 mg oral tablet: 1 tab(s) orally once a day Take at least 2 hours after aspirin  metoprolol succinate 50 mg oral capsule, extended release: 1 cap(s) orally  Mounjaro 10 mg/0.5 mL subcutaneous solution: 10 milligram(s) subcutaneously once a week on  saturday 03/01/2025  Pepcid 20 mg oral tablet: 1 tab(s) orally once a day  Potassium Chloride (Eqv-Klor-Con 10) 10 mEq oral tablet, extended release: 1 tab(s) orally once a day  Restasis 0.05% ophthalmic emulsion: 1 drop(s) in each affected eye once a day  sulfaSALAzine 500 mg oral tablet: 2 tab(s) orally 2 times a day  Tylenol 500 mg oral tablet: 3 tab(s) orally 2 times a day  valsartan 40 mg oral tablet: 1 tab(s) orally once a day   acetaminophen 500 mg oral tablet: 2 tab(s) orally every 8 hours  ALPRAZolam 0.5 mg oral tablet: 1 tab(s) orally prn  Aspirin EC 81 mg oral delayed release tablet: 1 tab(s) orally every 12 hours Do not start taking Aspirin until your have completed your entire 14 day course of Eliquis  Take at least two hours before taking Celebrex  CeleBREX 200 mg oral capsule: 1 cap(s) orally every 12 hours Take at least two hours after taking Aspirin  DULoxetine 30 mg oral delayed release capsule: 1 cap(s) orally once a day  Eliquis 2.5 mg oral tablet: 1 tab(s) orally 2 times a day Take this entire bottle before starting Aspirin  Lipitor 40 mg oral tablet: 1 tab(s) orally once a day (at bedtime)  metoprolol succinate 50 mg oral capsule, extended release: 1 cap(s) orally once a day  Mounjaro 10 mg/0.5 mL subcutaneous solution: 10 milligram(s) subcutaneously once a week on  saturday 03/01/2025  Narcan 4 mg/0.1 mL nasal spray: 4 milligram(s) intranasally once Instill one spray into nostril as needed for drug overdose. May repeat dose in alternate nostril in 2-3 minutes while waiting for EMS  omeprazole 20 mg oral delayed release capsule: 1 cap(s) orally once a day  oxyCODONE 5 mg oral tablet: 1 tab(s) orally every 4 to 6 hours as needed for  severe pain [place]  [id number] MDD: 6 tablets  Pepcid 20 mg oral tablet: 1 tab(s) orally once a day  polyethylene glycol 3350 oral powder for reconstitution: 17 gram(s) orally once a day (at bedtime)  Potassium Chloride (Eqv-Klor-Con 10) 10 mEq oral tablet, extended release: 1 tab(s) orally once a day  Restasis 0.05% ophthalmic emulsion: 1 drop(s) in each affected eye once a day  senna leaf extract oral tablet: 2 tab(s) orally once a day (at bedtime)  sulfaSALAzine 500 mg oral tablet: 2 tab(s) orally 2 times a day  valsartan 40 mg oral tablet: 1 tab(s) orally once a day   acetaminophen 500 mg oral tablet: 2 tab(s) orally every 8 hours  ALPRAZolam 0.5 mg oral tablet: 1 tab(s) orally prn  Aspirin EC 81 mg oral delayed release tablet: 1 tab(s) orally every 12 hours Do not start taking Aspirin until your have completed your entire 14 day course of Eliquis  Take at least two hours before taking Celebrex  cefadroxil 500 mg oral capsule: 1 cap(s) orally 2 times a day Take as directed until you complete the bottle to prevent infection  CeleBREX 200 mg oral capsule: 1 cap(s) orally every 12 hours Take at least two hours after taking Aspirin  Dilaudid 2 mg oral tablet: 1 tab(s) orally every 4 hours as needed for  moderate pain for severe pain, you may take 2 tablets, do not exceed 6 tablets in one day  [facility] : [id number] MDD: 6  DULoxetine 30 mg oral delayed release capsule: 1 cap(s) orally once a day  Eliquis 2.5 mg oral tablet: 1 tab(s) orally 2 times a day Take this entire bottle before starting Aspirin  Lipitor 40 mg oral tablet: 1 tab(s) orally once a day (at bedtime)  metoprolol succinate 50 mg oral capsule, extended release: 1 cap(s) orally once a day  Mounjaro 10 mg/0.5 mL subcutaneous solution: 10 milligram(s) subcutaneously once a week on  saturday 03/01/2025  Narcan 4 mg/0.1 mL nasal spray: 4 milligram(s) intranasally once Instill one spray into nostril as needed for drug overdose. May repeat dose in alternate nostril in 2-3 minutes while waiting for EMS  omeprazole 20 mg oral delayed release capsule: 1 cap(s) orally once a day  Pepcid 20 mg oral tablet: 1 tab(s) orally once a day  polyethylene glycol 3350 oral powder for reconstitution: 17 gram(s) orally once a day (at bedtime)  Potassium Chloride (Eqv-Klor-Con 10) 10 mEq oral tablet, extended release: 1 tab(s) orally once a day  Restasis 0.05% ophthalmic emulsion: 1 drop(s) in each affected eye once a day  senna leaf extract oral tablet: 2 tab(s) orally once a day (at bedtime)  sulfaSALAzine 500 mg oral tablet: 2 tab(s) orally 2 times a day  valsartan 40 mg oral tablet: 1 tab(s) orally once a day

## 2025-03-17 NOTE — DISCHARGE NOTE PROVIDER - NSDCFUSCHEDAPPT_GEN_ALL_CORE_FT
[facility]  ORTHOSURG [address] [place] [address]  Scheduled Appointment: 03/31/2025    Franklin Ibarra  [facility]  ORTHOSURG [address] [place] [address]  Scheduled Appointment: 04/29/2025

## 2025-03-17 NOTE — PRE-OP CHECKLIST - NSSDAENDDT_GEN_ALL_CORE
Omeprazole needs to be resent to THE [facility] 17-Mar-2025 15:51

## 2025-03-17 NOTE — PRE-OP CHECKLIST - HEART RATE (BEATS/MIN)
Outreach attempt was made to schedule an Annual Wellness Visit. This was the first attempt. Contact was not made, left message.     Spoke with patients wife, she will check with her  and he will call to schedule the Medicare Wellness Exam if he wants to have it done.  
67

## 2025-03-17 NOTE — DISCHARGE NOTE PROVIDER - CARE PROVIDER_API CALL
Frankiln Ibarra  Orthopaedic Surgery  [address]  [place] [address]  Phone: [phone]  Fax: [phone]  Follow Up Time: 2 weeks   Franklin Ibarra  Orthopaedic Surgery  [address]  [place] [address]  Phone: [phone]  Fax: [phone]  Established Patient  Scheduled Appointment: 03/31/2025 10:00 AM

## 2025-03-17 NOTE — DISCHARGE NOTE PROVIDER - PROVIDER TOKENS
PROVIDER:[TOKEN:[[id number]],FOLLOWUP:[2 weeks]] PROVIDER:[TOKEN:[[id number]],SCHEDULEDAPPT:[03/31/2025],SCHEDULEDAPPTTIME:[10:00 AM],ESTABLISHEDPATIENT:[T]]

## 2025-03-17 NOTE — DISCHARGE NOTE PROVIDER - NSDCCPTREATMENT_GEN_ALL_CORE_FT
PRINCIPAL PROCEDURE  Procedure: Revision of all components of total knee arthroplasty  Findings and Treatment:

## 2025-03-18 ENCOUNTER — TRANSCRIPTION ENCOUNTER (OUTPATIENT)
Age: 70
End: 2025-03-18

## 2025-03-18 LAB
BUN SERPL-MCNC: 13 MG/DL — SIGNIFICANT CHANGE UP (ref 7–23)
CALCIUM SERPL-MCNC: 8.9 MG/DL — SIGNIFICANT CHANGE UP (ref 8.4–10.5)
CHLORIDE SERPL-SCNC: 102 MMOL/L — SIGNIFICANT CHANGE UP (ref 96–108)
CO2 SERPL-SCNC: 29 MMOL/L — SIGNIFICANT CHANGE UP (ref 22–31)
CREAT SERPL-MCNC: 0.88 MG/DL — SIGNIFICANT CHANGE UP (ref 0.5–1.3)
EGFR: 70 ML/MIN/1.73M2 — SIGNIFICANT CHANGE UP
EGFR: 70 ML/MIN/1.73M2 — SIGNIFICANT CHANGE UP
GLUCOSE SERPL-MCNC: 143 MG/DL — HIGH (ref 70–99)
GRAM STN FLD: SIGNIFICANT CHANGE UP
HCT VFR BLD CALC: 39.1 % — SIGNIFICANT CHANGE UP (ref 34.5–45)
HGB BLD-MCNC: 12.4 G/DL — SIGNIFICANT CHANGE UP (ref 11.5–15.5)
MCHC RBC-ENTMCNC: 30.2 PG — SIGNIFICANT CHANGE UP (ref 27–34)
MCV RBC AUTO: 95.1 FL — SIGNIFICANT CHANGE UP (ref 80–100)
NRBC BLD AUTO-RTO: 0 /100 WBCS — SIGNIFICANT CHANGE UP (ref 0–0)
PLATELET # BLD AUTO: 175 K/UL — SIGNIFICANT CHANGE UP (ref 150–400)
POTASSIUM SERPL-MCNC: 4 MMOL/L — SIGNIFICANT CHANGE UP (ref 3.5–5.3)
POTASSIUM SERPL-SCNC: 4 MMOL/L — SIGNIFICANT CHANGE UP (ref 3.5–5.3)
RBC # BLD: 4.11 M/UL — SIGNIFICANT CHANGE UP (ref 3.8–5.2)
RBC # FLD: 13 % — SIGNIFICANT CHANGE UP (ref 10.3–14.5)
SODIUM SERPL-SCNC: 141 MMOL/L — SIGNIFICANT CHANGE UP (ref 135–145)
SPECIMEN SOURCE: SIGNIFICANT CHANGE UP
WBC # BLD: 13.15 K/UL — HIGH (ref 3.8–10.5)

## 2025-03-18 PROCEDURE — 99232 SBSQ HOSP IP/OBS MODERATE 35: CPT

## 2025-03-18 RX ORDER — OMEPRAZOLE 20 MG/1
1 CAPSULE, DELAYED RELEASE ORAL
Qty: 28 | Refills: 0
Start: 2025-03-18 | End: 2025-04-14

## 2025-03-18 RX ORDER — APIXABAN 2.5 MG/1
1 TABLET, FILM COATED ORAL
Qty: 28 | Refills: 0
Start: 2025-03-18 | End: 2025-03-31

## 2025-03-18 RX ORDER — POLYETHYLENE GLYCOL 3350 17 G/17G
17 POWDER, FOR SOLUTION ORAL
Qty: 0 | Refills: 0 | DISCHARGE
Start: 2025-03-18

## 2025-03-18 RX ORDER — HYDROMORPHONE/SOD CHLOR,ISO/PF 2 MG/10 ML
1 SYRINGE (ML) INJECTION
Qty: 42 | Refills: 0
Start: 2025-03-18 | End: 2025-03-24

## 2025-03-18 RX ORDER — SENNA 187 MG
2 TABLET ORAL
Qty: 0 | Refills: 0 | DISCHARGE
Start: 2025-03-18

## 2025-03-18 RX ORDER — ASPIRIN 325 MG
0 TABLET ORAL
Refills: 0 | DISCHARGE

## 2025-03-18 RX ORDER — CEFADROXIL 500 MG/1
1 CAPSULE ORAL
Qty: 14 | Refills: 0
Start: 2025-03-18 | End: 2025-03-24

## 2025-03-18 RX ORDER — CELECOXIB 50 MG/1
1 CAPSULE ORAL
Qty: 56 | Refills: 0
Start: 2025-03-18 | End: 2025-04-14

## 2025-03-18 RX ORDER — ASPIRIN 325 MG
1 TABLET ORAL
Qty: 28 | Refills: 0
Start: 2025-03-18 | End: 2025-03-31

## 2025-03-18 RX ORDER — CEFADROXIL 500 MG/1
500 CAPSULE ORAL
Refills: 0 | Status: DISCONTINUED | OUTPATIENT
Start: 2025-03-19 | End: 2025-03-19

## 2025-03-18 RX ORDER — ACETAMINOPHEN 500 MG/5ML
2 LIQUID (ML) ORAL
Qty: 0 | Refills: 0 | DISCHARGE
Start: 2025-03-18

## 2025-03-18 RX ORDER — HYDROMORPHONE/SOD CHLOR,ISO/PF 2 MG/10 ML
4 SYRINGE (ML) INJECTION
Refills: 0 | Status: DISCONTINUED | OUTPATIENT
Start: 2025-03-18 | End: 2025-03-19

## 2025-03-18 RX ORDER — ACETAMINOPHEN 500 MG/5ML
3 LIQUID (ML) ORAL
Refills: 0 | DISCHARGE

## 2025-03-18 RX ORDER — NALOXONE HYDROCHLORIDE 0.4 MG/ML
4 INJECTION, SOLUTION INTRAMUSCULAR; INTRAVENOUS; SUBCUTANEOUS
Qty: 1 | Refills: 0
Start: 2025-03-18

## 2025-03-18 RX ORDER — HYDROMORPHONE/SOD CHLOR,ISO/PF 2 MG/10 ML
2 SYRINGE (ML) INJECTION
Refills: 0 | Status: DISCONTINUED | OUTPATIENT
Start: 2025-03-18 | End: 2025-03-19

## 2025-03-18 RX ORDER — OXYCODONE HYDROCHLORIDE 30 MG/1
1 TABLET ORAL
Qty: 42 | Refills: 0
Start: 2025-03-18 | End: 2025-03-24

## 2025-03-18 RX ADMIN — ATORVASTATIN CALCIUM 40 MILLIGRAM(S): 80 TABLET, FILM COATED ORAL at 21:11

## 2025-03-18 RX ADMIN — Medication 1000 MILLIGRAM(S): at 13:44

## 2025-03-18 RX ADMIN — Medication 40 MILLIGRAM(S): at 06:16

## 2025-03-18 RX ADMIN — Medication 0.5 MILLIGRAM(S): at 09:37

## 2025-03-18 RX ADMIN — Medication 1000 MILLIGRAM(S): at 06:16

## 2025-03-18 RX ADMIN — APIXABAN 2.5 MILLIGRAM(S): 2.5 TABLET, FILM COATED ORAL at 09:37

## 2025-03-18 RX ADMIN — DEXAMETHASONE 101.6 MILLIGRAM(S): 0.5 TABLET ORAL at 06:16

## 2025-03-18 RX ADMIN — METOPROLOL SUCCINATE 50 MILLIGRAM(S): 50 TABLET, EXTENDED RELEASE ORAL at 06:16

## 2025-03-18 RX ADMIN — CELECOXIB 200 MILLIGRAM(S): 50 CAPSULE ORAL at 21:10

## 2025-03-18 RX ADMIN — Medication 4 MILLIGRAM(S): at 20:11

## 2025-03-18 RX ADMIN — Medication 5 MILLIGRAM(S): at 21:10

## 2025-03-18 RX ADMIN — Medication 1000 MILLIGRAM(S): at 00:24

## 2025-03-18 RX ADMIN — CELECOXIB 200 MILLIGRAM(S): 50 CAPSULE ORAL at 10:07

## 2025-03-18 RX ADMIN — CELECOXIB 200 MILLIGRAM(S): 50 CAPSULE ORAL at 22:33

## 2025-03-18 RX ADMIN — Medication 500 MILLILITER(S): at 01:11

## 2025-03-18 RX ADMIN — Medication 1000 MILLIGRAM(S): at 21:10

## 2025-03-18 RX ADMIN — Medication 1000 MILLIGRAM(S): at 06:31

## 2025-03-18 RX ADMIN — Medication 1000 MILLIGRAM(S): at 14:14

## 2025-03-18 RX ADMIN — APIXABAN 2.5 MILLIGRAM(S): 2.5 TABLET, FILM COATED ORAL at 21:10

## 2025-03-18 RX ADMIN — Medication 0.5 MILLIGRAM(S): at 14:00

## 2025-03-18 RX ADMIN — Medication 100 MILLIGRAM(S): at 11:09

## 2025-03-18 RX ADMIN — DULOXETINE 30 MILLIGRAM(S): 20 CAPSULE, DELAYED RELEASE ORAL at 12:13

## 2025-03-18 RX ADMIN — SULFASALAZINE 1000 MILLIGRAM(S): 500 TABLET ORAL at 18:17

## 2025-03-18 RX ADMIN — Medication 2 MILLIGRAM(S): at 12:10

## 2025-03-18 RX ADMIN — Medication 2 MILLIGRAM(S): at 11:40

## 2025-03-18 RX ADMIN — Medication 4 MILLIGRAM(S): at 20:41

## 2025-03-18 RX ADMIN — Medication 400 MILLIGRAM(S): at 00:20

## 2025-03-18 RX ADMIN — CELECOXIB 200 MILLIGRAM(S): 50 CAPSULE ORAL at 09:37

## 2025-03-18 RX ADMIN — SULFASALAZINE 1000 MILLIGRAM(S): 500 TABLET ORAL at 06:16

## 2025-03-18 RX ADMIN — Medication 0.5 MILLIGRAM(S): at 14:30

## 2025-03-18 RX ADMIN — Medication 1000 MILLIGRAM(S): at 22:33

## 2025-03-18 RX ADMIN — OXYCODONE HYDROCHLORIDE 5 MILLIGRAM(S): 30 TABLET ORAL at 10:52

## 2025-03-18 NOTE — OCCUPATIONAL THERAPY INITIAL EVALUATION ADULT - DIAGNOSIS, OT EVAL
Post-Care Instructions: I reviewed with the patient in detail post-care instructions. Patient is to wear sunprotection, and avoid picking at any of the treated lesions. Pt may apply Vaseline to crusted or scabbing areas. Price (Use Numbers Only, No Special Characters Or $): 200 Consent: The patient's consent was obtained including but not limited to risks of crusting, scabbing, blistering, scarring, darker or lighter pigmentary change, recurrence, incomplete removal and infection. Detail Level: Zone Patient with decreased ADL status and impairments with functional mobility.

## 2025-03-18 NOTE — CAREGIVER ENGAGEMENT NOTE - CAREGIVER OUTREACH NOTES - FREE TEXT
Transition of Care Plan:  Patient A&Ox 4; is self directing own transition of care planning; DC to home; with [facility] per patient choice; family will transport to home; NO DME will arrange own transport to home; patient instructed to obtain clearance prior to driving again; will f/u with SCAR TORRES MD post MD;

## 2025-03-18 NOTE — CARE COORDINATION ASSESSMENT. - NSCAREPROVIDERS_GEN_ALL_CORE_FT
CARE PROVIDERS:  Administration: Lolly Kearns  Administration: Thelma Barth  Admitting: Franklin Ibarra  Attending: Franklin Ibarra  Case Management: Santaromana, Anna  Consultant: Ha Lafleur  Nurse: Adam, Merline  Occupational Therapy: Tricia Byrd  Ordered: Physician, Ordering  PCA/Nursing Assistant: Linda Green  Physical Therapy: Pratima Bourne  Physical Therapy: Scotty Chapman  Primary Team: Christofer Ortega  Primary Team: Mendoza Mukherjee  Primary Team: Pawan Ramirez  Primary Team: Luis Davis  Primary Team: Joaquin Quinn  Primary Team: Madi Licona  Primary Team: Abby Quiros  Primary Team: Herrera Raymond  Primary Team: Jesús Valdez  Primary Team: Valentin Macedo  Team: JANINE MARSHALL Hospitalists, Team  UR// Supp. Assoc.: Edie Banuelos   CARE PROVIDERS:  Administration: Lolly Kearns  Administration: Thelma Barth  Admitting: Franklin Ibarra  Attending: Franklin Ibarra  Case Management: Santaromana, Anna  Consultant: Ha Lafleur  Nurse: Adam, Merline  Occupational Therapy: Tricia Byrd  Ordered: Physician, Ordering  PCA/Nursing Assistant: Linda Green  Physical Therapy: Pratima Bourne  Physical Therapy: Scotty Chapman  Primary Team: Christofer Ortega  Primary Team: Mendoza Mukherjee  Primary Team: Pawan Ramirez  Primary Team: Luis Davis  Primary Team: Madi Licona  Primary Team: Abby Quiros  Primary Team: Herrera Raymond  Primary Team: Jesús Valdez  Primary Team: Valentin Macedo  Team: JANINE MARSHALL Hospitalists, Team  UR// Supp. Assoc.: Edie Banuelos

## 2025-03-18 NOTE — PATIENT CHOICE NOTE. - NSPTCHOICENOTES_GEN_ALL_CORE
RN LUCIAN met with pt. for assessment; transition of care planning at bedside, patient lives with family in private home;  1STE to enter; 5STE  ; A&O x4; spouseJack e to assume care for patient and commit to caring for her post hosp. DC; CM role and DC planning process explained; verbalized understanding.      Transition of Care Plan:  Patient A&Ox 4; is self directing own transition of care planning; DC to home; with [facility] Care per patient choice; family will transport to home; NO DME will arrange own transport to home; patient instructed to obtain clearance prior to driving again; will f/u with SCAR TORRES MD post MD;

## 2025-03-18 NOTE — CARE COORDINATION ASSESSMENT. - NSPASTMEDSURGHISTORY_GEN_ALL_CORE_FT
PAST MEDICAL & SURGICAL HISTORY:  Hypertension      Arthritis      Knee pain      Status post total left knee replacement      S/P hip replacement, bilateral      Osteoarthritis      GERD (gastroesophageal reflux disease)      HLD (hyperlipidemia)      Chronic low back pain      S/P tonsillectomy      S/p bilateral carpal tunnel release      S/P  section      S/P total knee replacement, right      Mild depression      H/O hypokalemia      Palpitation      Panic attack      H/O rheumatoid arthritis      Left knee pain      Obesity      Anxiety      H/O colonoscopy      Uterine polyp      H/O dilation and curettage      S/p revision of right total hip

## 2025-03-18 NOTE — DISCHARGE NOTE NURSING/CASE MANAGEMENT/SOCIAL WORK - FINANCIAL ASSISTANCE
[facility] provides services at a reduced cost to those who are determined to be eligible through [facility]’s financial assistance program. Information regarding [facility]’s financial assistance program can be found by going to [email or web address] or by calling [phone].

## 2025-03-18 NOTE — DISCHARGE NOTE NURSING/CASE MANAGEMENT/SOCIAL WORK - PATIENT PORTAL LINK FT
You can access the FollowMyHealth Patient Portal offered by [facility] by registering at the following website: [email or web address]. By joining [employer]’s FollowMyHealth portal, you will also be able to view your health information using other applications (apps) compatible with our system.

## 2025-03-18 NOTE — OCCUPATIONAL THERAPY INITIAL EVALUATION ADULT - ADDITIONAL COMMENTS
Pt lives with spouse in a private home 2 [address] +railing pt reports she will stay on main floor w/ 1/2 bath and sleep in a recliner. Pt will sponge bath upon d/c. Pt owns a RW, commode, SAC and sock aid

## 2025-03-18 NOTE — CARE COORDINATION ASSESSMENT. - NSADDITIONAL INFORMATION_FT
RN LUCIAN met with pt. for assessment; transition of care planning at bedside, patient lives with family in private home;  1STE to enter; 5STE  ; A&O x4; spouseJack e to assume care for patient and commit to caring for her post hosp. DC; CM role and DC planning process explained; verbalized understanding.  Pt. reports; m oralia    Transition of Care Plan:  Patient A&Ox 4; is self directing own transition of care planning; DC to home; with [facility] per patient choice; family will transport to home; NO DME will arrange own transport to home; patient instructed to obtain clearance prior to driving again; will f/u with SCAR TORRES MD post MD;

## 2025-03-18 NOTE — PHYSICAL THERAPY INITIAL EVALUATION ADULT - PERTINENT HX OF CURRENT PROBLEM, REHAB EVAL
this is a 68 y/o female who had left knee replacement about 20 yrs ago, has been having pain on left knee. She  states that she feels some stiffness with difficulty ambulating. She complains 8/10 pain with ADL, ROM. She is scheduled for Revision left total knee replacement on 3/17/2025

## 2025-03-19 VITALS
SYSTOLIC BLOOD PRESSURE: 103 MMHG | OXYGEN SATURATION: 97 % | RESPIRATION RATE: 20 BRPM | TEMPERATURE: 98 F | DIASTOLIC BLOOD PRESSURE: 57 MMHG | HEART RATE: 68 BPM

## 2025-03-19 LAB
ANION GAP SERPL CALC-SCNC: 7 MMOL/L — SIGNIFICANT CHANGE UP (ref 5–17)
BUN SERPL-MCNC: 21 MG/DL — SIGNIFICANT CHANGE UP (ref 7–23)
CALCIUM SERPL-MCNC: 9 MG/DL — SIGNIFICANT CHANGE UP (ref 8.4–10.5)
CHLORIDE SERPL-SCNC: 104 MMOL/L — SIGNIFICANT CHANGE UP (ref 96–108)
CO2 SERPL-SCNC: 29 MMOL/L — SIGNIFICANT CHANGE UP (ref 22–31)
CREAT SERPL-MCNC: 0.76 MG/DL — SIGNIFICANT CHANGE UP (ref 0.5–1.3)
EGFR: 84 ML/MIN/1.73M2 — SIGNIFICANT CHANGE UP
EGFR: 84 ML/MIN/1.73M2 — SIGNIFICANT CHANGE UP
GLUCOSE SERPL-MCNC: 106 MG/DL — HIGH (ref 70–99)
HCT VFR BLD CALC: 33.5 % — LOW (ref 34.5–45)
HGB BLD-MCNC: 10.7 G/DL — LOW (ref 11.5–15.5)
MCHC RBC-ENTMCNC: 29.9 PG — SIGNIFICANT CHANGE UP (ref 27–34)
MCHC RBC-ENTMCNC: 31.9 G/DL — LOW (ref 32–36)
NRBC BLD AUTO-RTO: 0 /100 WBCS — SIGNIFICANT CHANGE UP (ref 0–0)
PLATELET # BLD AUTO: 153 K/UL — SIGNIFICANT CHANGE UP (ref 150–400)
POTASSIUM SERPL-MCNC: 3.8 MMOL/L — SIGNIFICANT CHANGE UP (ref 3.5–5.3)
POTASSIUM SERPL-SCNC: 3.8 MMOL/L — SIGNIFICANT CHANGE UP (ref 3.5–5.3)
RBC # BLD: 3.58 M/UL — LOW (ref 3.8–5.2)
RBC # FLD: 13.2 % — SIGNIFICANT CHANGE UP (ref 10.3–14.5)
SODIUM SERPL-SCNC: 140 MMOL/L — SIGNIFICANT CHANGE UP (ref 135–145)
WBC # BLD: 8.5 K/UL — SIGNIFICANT CHANGE UP (ref 3.8–10.5)
WBC # FLD AUTO: 8.5 K/UL — SIGNIFICANT CHANGE UP (ref 3.8–10.5)

## 2025-03-19 PROCEDURE — 80048 BASIC METABOLIC PNL TOTAL CA: CPT

## 2025-03-19 PROCEDURE — 99232 SBSQ HOSP IP/OBS MODERATE 35: CPT

## 2025-03-19 PROCEDURE — 97535 SELF CARE MNGMENT TRAINING: CPT

## 2025-03-19 PROCEDURE — 97110 THERAPEUTIC EXERCISES: CPT

## 2025-03-19 PROCEDURE — C9399: CPT

## 2025-03-19 PROCEDURE — C1889: CPT

## 2025-03-19 PROCEDURE — 87070 CULTURE OTHR SPECIMN AEROBIC: CPT

## 2025-03-19 PROCEDURE — 86922 COMPATIBILITY TEST ANTIGLOB: CPT

## 2025-03-19 PROCEDURE — 97161 PT EVAL LOW COMPLEX 20 MIN: CPT

## 2025-03-19 PROCEDURE — 86901 BLOOD TYPING SEROLOGIC RH(D): CPT

## 2025-03-19 PROCEDURE — 97530 THERAPEUTIC ACTIVITIES: CPT

## 2025-03-19 PROCEDURE — 86900 BLOOD TYPING SEROLOGIC ABO: CPT

## 2025-03-19 PROCEDURE — 87205 SMEAR GRAM STAIN: CPT

## 2025-03-19 PROCEDURE — 36415 COLL VENOUS BLD VENIPUNCTURE: CPT

## 2025-03-19 PROCEDURE — 88305 TISSUE EXAM BY PATHOLOGIST: CPT

## 2025-03-19 PROCEDURE — 87015 SPECIMEN INFECT AGNT CONCNTJ: CPT

## 2025-03-19 PROCEDURE — 88311 DECALCIFY TISSUE: CPT

## 2025-03-19 PROCEDURE — 97116 GAIT TRAINING THERAPY: CPT

## 2025-03-19 PROCEDURE — 88300 SURGICAL PATH GROSS: CPT

## 2025-03-19 PROCEDURE — C1713: CPT

## 2025-03-19 PROCEDURE — 86850 RBC ANTIBODY SCREEN: CPT

## 2025-03-19 PROCEDURE — 87075 CULTR BACTERIA EXCEPT BLOOD: CPT

## 2025-03-19 PROCEDURE — 85027 COMPLETE CBC AUTOMATED: CPT

## 2025-03-19 PROCEDURE — C1776: CPT

## 2025-03-19 PROCEDURE — 97165 OT EVAL LOW COMPLEX 30 MIN: CPT

## 2025-03-19 PROCEDURE — 73560 X-RAY EXAM OF KNEE 1 OR 2: CPT

## 2025-03-19 RX ADMIN — CELECOXIB 200 MILLIGRAM(S): 50 CAPSULE ORAL at 08:58

## 2025-03-19 RX ADMIN — Medication 4 MILLIGRAM(S): at 09:42

## 2025-03-19 RX ADMIN — CEFADROXIL 500 MILLIGRAM(S): 500 CAPSULE ORAL at 06:04

## 2025-03-19 RX ADMIN — APIXABAN 2.5 MILLIGRAM(S): 2.5 TABLET, FILM COATED ORAL at 08:58

## 2025-03-19 RX ADMIN — METOPROLOL SUCCINATE 50 MILLIGRAM(S): 50 TABLET, EXTENDED RELEASE ORAL at 06:05

## 2025-03-19 RX ADMIN — Medication 1000 MILLIGRAM(S): at 06:04

## 2025-03-19 RX ADMIN — Medication 1000 MILLIGRAM(S): at 13:28

## 2025-03-19 RX ADMIN — Medication 4 MILLIGRAM(S): at 13:26

## 2025-03-19 RX ADMIN — Medication 1000 MILLIGRAM(S): at 06:20

## 2025-03-19 RX ADMIN — Medication 4 MILLIGRAM(S): at 14:13

## 2025-03-19 RX ADMIN — SULFASALAZINE 1000 MILLIGRAM(S): 500 TABLET ORAL at 06:04

## 2025-03-19 RX ADMIN — CELECOXIB 200 MILLIGRAM(S): 50 CAPSULE ORAL at 09:01

## 2025-03-19 RX ADMIN — Medication 1000 MILLIGRAM(S): at 13:26

## 2025-03-19 RX ADMIN — Medication 4 MILLIGRAM(S): at 08:58

## 2025-03-19 RX ADMIN — Medication 40 MILLIGRAM(S): at 06:04

## 2025-03-19 NOTE — PROGRESS NOTE ADULT - ASSESSMENT
Aftercare s/p L knee arthroplasty  - pain control as per ortho team  - IV fluid and postop antibiotics as per ortho team  - PT/OT consult  - VTE prophylaxis as per ortho    Other chronic condition:  HTN: C/w valsartan 40 mg qd   depression: C/w duloxetine 30 mg qd   HLD: C/w lipitor 40 mg qd   CAD w/o critical stenosis: C/w met succ 50 mg qd, lipitor 40 mg qd   rheumatoid arthritis: C/w sulfasalazine 1000 mg bid       Full code     
Aftercare s/p L knee arthroplasty  - pain control as per ortho team  - IV fluid and postop antibiotics as per ortho team  - PT/OT consult  - VTE prophylaxis as per ortho  - medically stable for discharge    Other chronic condition:  HTN: C/w valsartan 40 mg qd   depression: C/w duloxetine 30 mg qd   HLD: C/w lipitor 40 mg qd   CAD w/o critical stenosis: C/w met succ 50 mg qd, lipitor 40 mg qd   rheumatoid arthritis: C/w sulfasalazine 1000 mg bid       Full code

## 2025-03-19 NOTE — PROGRESS NOTE ADULT - SUBJECTIVE AND OBJECTIVE BOX
Discharge medication calendar:  Eliquis 2.5mg q12h x 2 weeks then ASA EC 81mg q12h x 2 weeks  Omeprazole 20mg QAM x 4 weeks  Celecoxib 200mg q12h x 2-3 weeks  APAP 1000mg q8h x 2-3 weeks  Narcotic PRN  Docusate 100mg TID while taking narcotic  Miralax, Senna, or Bisacodyl PRN for treatment of constipation  Cefadroxil 500 mg q12h x 7 days  
Orthopedics  POD#:  1  S/P: Revision Left Total Knee Arthroplasty                       SUBJECTIVE: Patient seen and examined at bedside. Denies nausea, vomiting, diarrhea, chest pain, shortness of breath, headache, dizziness.  Reported Pain Score = 3/10    OBJECTIVE:     Vital Signs Last 24 Hrs  T(C): 36.5 (18 Mar 2025 07:56), Max: 37.3 (17 Mar 2025 19:10)  T(F): 97.7 (18 Mar 2025 07:56), Max: 99.2 (17 Mar 2025 19:10)  HR: 65 (18 Mar 2025 07:56) (55 - 78)  BP: 122/61 (18 Mar 2025 07:56) (110/61 - 133/61)  BP(mean): --  RR: 18 (18 Mar 2025 07:56) (16 - 23)  SpO2: 97% (18 Mar 2025 07:56) (94% - 99%)    Parameters below as of 18 Mar 2025 07:56  Patient On (Oxygen Delivery Method): room air        Left Knee:          Dressing: clean/dry/intact    Bilateral LEs:         Sensation:  intact to light touch          Motor exam:  5/5 dorsiflexion/plantarflexion/EHL          2+ DP and PT pulses          calf supple, NT         SCDs in place    LABS:                        12.4   13.15 )-----------( 175      ( 18 Mar 2025 06:00 )             39.1     03-18    141  |  102  |  13  ----------------------------<  143[H]  4.0   |  29  |  0.88    Ca    8.9      18 Mar 2025 06:00        Urinalysis Basic - ( 18 Mar 2025 06:00 )    Color: x / Appearance: x / SG: x / pH: x  Gluc: 143 mg/dL / Ketone: x  / Bili: x / Urobili: x   Blood: x / Protein: x / Nitrite: x   Leuk Esterase: x / RBC: x / WBC x   Sq Epi: x / Non Sq Epi: x / Bacteria: x        MEDICATIONS:  Anticoagulation:  apixaban 2.5 milliGRAM(s) Oral every 12 hours      Pain medications:   acetaminophen     Tablet .. 1000 milliGRAM(s) Oral every 8 hours  celecoxib 200 milliGRAM(s) Oral every 12 hours  DULoxetine 30 milliGRAM(s) Oral daily  HYDROmorphone   Tablet 2 milliGRAM(s) Oral every 3 hours PRN  HYDROmorphone   Tablet 4 milliGRAM(s) Oral every 3 hours PRN  HYDROmorphone  Injectable 0.5 milliGRAM(s) IV Push every 3 hours PRN  ondansetron Injectable 4 milliGRAM(s) IV Push every 6 hours PRN      A/P: s/p Left Total Knee Arthroplasty POD # 1  -    Pain control  -    DVT ppx: Eliquis 2.5mg BID  -    Weight bearing status: WBAT LLE  -    Physical Therapy  -    Occupational Therapy  -    Discharge plan: home today pending PT/OT/medical clearance
Patient is a 69y old  Female who presents with a chief complaint of S/p revision of Lt TKA (17 Mar 2025 19:55)    INTERVAL HPI/OVERNIGHT EVENTS:  No major acute events overnight.  This AM, patient seen in her room sitting in the chair.  Pain is tolerable at the moment. No new numbness or weakness noted.  No nausea.  No other complaints.      MEDICATIONS  (STANDING):  acetaminophen     Tablet .. 1000 milliGRAM(s) Oral every 8 hours  apixaban 2.5 milliGRAM(s) Oral every 12 hours  atorvastatin 40 milliGRAM(s) Oral at bedtime  cefadroxil 500 milliGRAM(s) Oral two times a day  celecoxib 200 milliGRAM(s) Oral every 12 hours  DULoxetine 30 milliGRAM(s) Oral daily  lactated ringers. 1000 milliLiter(s) (100 mL/Hr) IV Continuous <Continuous>  metoprolol succinate ER 50 milliGRAM(s) Oral daily  pantoprazole    Tablet 40 milliGRAM(s) Oral before breakfast  polyethylene glycol 3350 17 Gram(s) Oral at bedtime  senna 2 Tablet(s) Oral at bedtime  sulfaSALAzine 1000 milliGRAM(s) Oral two times a day  valsartan 40 milliGRAM(s) Oral daily    MEDICATIONS  (PRN):  HYDROmorphone   Tablet 2 milliGRAM(s) Oral every 3 hours PRN Moderate Pain (4 - 6)  HYDROmorphone   Tablet 4 milliGRAM(s) Oral every 3 hours PRN Severe Pain (7 - 10)  HYDROmorphone  Injectable 0.5 milliGRAM(s) IV Push every 3 hours PRN Breakthrough pain  magnesium hydroxide Suspension 30 milliLiter(s) Oral daily PRN Constipation  ondansetron Injectable 4 milliGRAM(s) IV Push every 6 hours PRN Nausea and/or Vomiting  zolpidem 5 milliGRAM(s) Oral at bedtime PRN Insomnia      Allergies  No Known Allergies    ROS as above and reviewed and is otherwise negative    PHYSICAL EXAM:  Vital Signs Last 24 Hrs  T(C): 36.4 (19 Mar 2025 07:52), Max: 36.7 (18 Mar 2025 15:42)  T(F): 97.6 (19 Mar 2025 07:52), Max: 98.1 (18 Mar 2025 15:42)  HR: 68 (19 Mar 2025 07:52) (58 - 73)  BP: 103/57 (19 Mar 2025 07:52) (103/57 - 116/74)  BP(mean): --  RR: 20 (19 Mar 2025 07:52) (18 - 20)  SpO2: 97% (19 Mar 2025 07:52) (94% - 98%)    Parameters below as of 19 Mar 2025 07:52  Patient On (Oxygen Delivery Method): room air    GENERAL:     age appropriate, in NAD  HEAD:     atraumatic, normocephalic  EYES:     EOMI, conjunctiva and sclera clear  RESPIRATORY:     clear to auscultation bilaterally, no rales or rhonchi or wheezing or rubs  CARDIOVASCULAR:     regular rate and rhythm, no murmurs or rubs or gallops  GASTROINTESTINAL:     soft, nontender, nondistended, bowel sounds present  EXTREMITIES:     no clubbing or cyanosis or edema  MUSCULOSKELETAL:     left knee pain  NERVOUS SYSTEM:     moves all toes and both feet, no sensory deficits noted  PSYCH:     appropriate, alert and orientated x3, good concentration      LABS:                        10.7   8.50  )-----------( 153      ( 19 Mar 2025 06:30 )             33.5     19 Mar 2025 06:30    140    |  104    |  21     ----------------------------<  106    3.8     |  29     |  0.76     Ca    9.0        19 Mar 2025 06:30        Urinalysis Basic - ( 19 Mar 2025 06:30 )    Color: x / Appearance: x / SG: x / pH: x  Gluc: 106 mg/dL / Ketone: x  / Bili: x / Urobili: x   Blood: x / Protein: x / Nitrite: x   Leuk Esterase: x / RBC: x / WBC x   Sq Epi: x / Non Sq Epi: x / Bacteria: x      CAPILLARY BLOOD GLUCOSE          
Orthopedics  POD#:  2  S/P: Revision Left Total Knee Arthroplasty                       SUBJECTIVE: Patient seen and examined at bedside. Denies nausea, vomiting, diarrhea, chest pain, shortness of breath, headache, dizziness.  Reported Pain Score = 3/10    OBJECTIVE:     Vital Signs Last 24 Hrs  T(C): 36.4 (19 Mar 2025 07:52), Max: 36.7 (18 Mar 2025 15:42)  T(F): 97.6 (19 Mar 2025 07:52), Max: 98.1 (18 Mar 2025 15:42)  HR: 68 (19 Mar 2025 07:52) (58 - 73)  BP: 103/57 (19 Mar 2025 07:52) (103/57 - 116/74)  BP(mean): --  RR: 20 (19 Mar 2025 07:52) (18 - 20)  SpO2: 97% (19 Mar 2025 07:52) (94% - 98%)    Parameters below as of 19 Mar 2025 07:52  Patient On (Oxygen Delivery Method): room air        Left Knee:          Dressing: clean/dry/intact    Bilateral LEs:         Sensation:  intact to light touch          Motor exam:  5/5 dorsiflexion/plantarflexion/EHL          2+ DP and PT pulses          calf supple, NT         SCDs in place    LABS:                        10.7   8.50  )-----------( 153      ( 19 Mar 2025 06:30 )             33.5     03-19    140  |  104  |  21  ----------------------------<  106[H]  3.8   |  29  |  0.76    Ca    9.0      19 Mar 2025 06:30        Urinalysis Basic - ( 19 Mar 2025 06:30 )    Color: x / Appearance: x / SG: x / pH: x  Gluc: 106 mg/dL / Ketone: x  / Bili: x / Urobili: x   Blood: x / Protein: x / Nitrite: x   Leuk Esterase: x / RBC: x / WBC x   Sq Epi: x / Non Sq Epi: x / Bacteria: x        MEDICATIONS:  Anticoagulation:  apixaban 2.5 milliGRAM(s) Oral every 12 hours      Pain medications:   acetaminophen     Tablet .. 1000 milliGRAM(s) Oral every 8 hours  celecoxib 200 milliGRAM(s) Oral every 12 hours  DULoxetine 30 milliGRAM(s) Oral daily  HYDROmorphone   Tablet 2 milliGRAM(s) Oral every 3 hours PRN  HYDROmorphone   Tablet 4 milliGRAM(s) Oral every 3 hours PRN  HYDROmorphone  Injectable 0.5 milliGRAM(s) IV Push every 3 hours PRN  ondansetron Injectable 4 milliGRAM(s) IV Push every 6 hours PRN  zolpidem 5 milliGRAM(s) Oral at bedtime PRN      A/P: s/p Left Total Knee Arthroplasty POD # 2  -    Pain control  -    DVT ppx: Eliquis 2.5mg BID  -    Weight bearing status: WBAT LLE  -    Physical Therapy  -    Occupational Therapy  -    Discharge plan: home today pending PT/OT/medical clearance
Patient is a 69y old  Female who presents with a chief complaint of S/p revision of Lt TKA (17 Mar 2025 19:55)      INTERVAL HPI/OVERNIGHT EVENTS:  Patient seen awake in bed. She reports some knee discomfort but controlled with medication. Reports sleeping okay overnight.       REVIEW OF SYSTEMS:  CONSTITUTIONAL: No fever, weight loss, or fatigue  EYES: No eye pain, visual disturbances, or discharge  ENMT:  No difficulty hearing, tinnitus, vertigo; No sinus or throat pain  NECK: No pain or stiffness  RESPIRATORY: No cough, wheezing, chills or hemoptysis; No shortness of breath  CARDIOVASCULAR: No chest pain, palpitations, lightheadedness, or leg swelling  GASTROINTESTINAL: No abdominal or epigastric pain. No nausea, vomiting, or diarrhea  NEUROLOGICAL: No headaches, memory loss, vertigo, loss of strength, numbness, or tremors  SKIN: No itching, burning, rashes, or lesions   MUSCULOSKELETAL: L knee pain. No muscle or back pain      PHYSICAL EXAM:  GENERAL: Adult Female, NAD, well-groomed, well-developed  HEAD:  Atraumatic, Normocephalic  EYES: EOMI, PERRLA, conjunctiva and sclera clear  ENMT: Moist mucous membranes, Good dentition, No lesions  NECK: No JVD appreciated  NERVOUS SYSTEM:  Alert & Oriented X3, Good concentration; All 4 extremities mobile, no gross sensory deficits.   CHEST/LUNG: No increased work of breathing, no wheezing appreciated  HEART: No limb edema appreciated  ABDOMEN: Nondistended  EXTREMITIES:  No clubbing, cyanosis, or edema appreciated  LYMPH: No lymphadenopathy noted  SKIN: No rashes or lesions appreciated on visible skin      MEDICATIONS  (STANDING):  acetaminophen     Tablet .. 1000 milliGRAM(s) Oral every 8 hours  apixaban 2.5 milliGRAM(s) Oral every 12 hours  atorvastatin 40 milliGRAM(s) Oral at bedtime  celecoxib 200 milliGRAM(s) Oral every 12 hours  DULoxetine 30 milliGRAM(s) Oral daily  lactated ringers. 1000 milliLiter(s) (100 mL/Hr) IV Continuous <Continuous>  metoprolol succinate ER 50 milliGRAM(s) Oral daily  pantoprazole    Tablet 40 milliGRAM(s) Oral before breakfast  polyethylene glycol 3350 17 Gram(s) Oral at bedtime  senna 2 Tablet(s) Oral at bedtime  sulfaSALAzine 1000 milliGRAM(s) Oral two times a day  valsartan 40 milliGRAM(s) Oral daily    MEDICATIONS  (PRN):  HYDROmorphone   Tablet 2 milliGRAM(s) Oral every 3 hours PRN Moderate Pain (4 - 6)  HYDROmorphone   Tablet 4 milliGRAM(s) Oral every 3 hours PRN Severe Pain (7 - 10)  HYDROmorphone  Injectable 0.5 milliGRAM(s) IV Push every 3 hours PRN Breakthrough pain  magnesium hydroxide Suspension 30 milliLiter(s) Oral daily PRN Constipation  ondansetron Injectable 4 milliGRAM(s) IV Push every 6 hours PRN Nausea and/or Vomiting      Allergies    No Known Allergies    Intolerances        Vital Signs Last 24 Hrs  T(C): 36.5 (18 Mar 2025 07:56), Max: 37.3 (17 Mar 2025 19:10)  T(F): 97.7 (18 Mar 2025 07:56), Max: 99.2 (17 Mar 2025 19:10)  HR: 65 (18 Mar 2025 07:56) (55 - 78)  BP: 122/61 (18 Mar 2025 07:56) (110/61 - 133/61)  BP(mean): --  RR: 18 (18 Mar 2025 07:56) (16 - 23)  SpO2: 97% (18 Mar 2025 07:56) (94% - 99%)    Parameters below as of 18 Mar 2025 07:56  Patient On (Oxygen Delivery Method): room air        LABS:                        12.4   13.15 )-----------( 175      ( 18 Mar 2025 06:00 )             39.1     18 Mar 2025 06:00    141    |  102    |  13     ----------------------------<  143    4.0     |  29     |  0.88     Ca    8.9        18 Mar 2025 06:00        Urinalysis Basic - ( 18 Mar 2025 06:00 )    Color: x / Appearance: x / SG: x / pH: x  Gluc: 143 mg/dL / Ketone: x  / Bili: x / Urobili: x   Blood: x / Protein: x / Nitrite: x   Leuk Esterase: x / RBC: x / WBC x   Sq Epi: x / Non Sq Epi: x / Bacteria: x      CAPILLARY BLOOD GLUCOSE

## 2025-03-27 ENCOUNTER — NON-APPOINTMENT (OUTPATIENT)
Age: 70
End: 2025-03-27

## 2025-03-29 ENCOUNTER — NON-APPOINTMENT (OUTPATIENT)
Age: 70
End: 2025-03-29

## 2025-03-31 ENCOUNTER — APPOINTMENT (OUTPATIENT)
Dept: ORTHOPEDIC SURGERY | Facility: CLINIC | Age: 70
End: 2025-03-31
Payer: MEDICARE

## 2025-03-31 DIAGNOSIS — Z96.652 PRESENCE OF LEFT ARTIFICIAL KNEE JOINT: ICD-10-CM

## 2025-03-31 LAB
CULTURE RESULTS: SIGNIFICANT CHANGE UP
SPECIMEN SOURCE: SIGNIFICANT CHANGE UP

## 2025-03-31 PROCEDURE — 99024 POSTOP FOLLOW-UP VISIT: CPT

## 2025-03-31 PROCEDURE — 73562 X-RAY EXAM OF KNEE 3: CPT | Mod: LT

## 2025-03-31 RX ORDER — HYDROMORPHONE HYDROCHLORIDE 2 MG/1
2 TABLET ORAL
Qty: 40 | Refills: 0 | Status: ACTIVE | COMMUNITY
Start: 2025-03-31 | End: 1900-01-01

## 2025-04-03 ENCOUNTER — APPOINTMENT (OUTPATIENT)
Dept: INTERNAL MEDICINE | Facility: CLINIC | Age: 70
End: 2025-04-03
Payer: MEDICARE

## 2025-04-03 VITALS
HEART RATE: 92 BPM | RESPIRATION RATE: 16 BRPM | WEIGHT: 213 LBS | TEMPERATURE: 98.2 F | BODY MASS INDEX: 41.82 KG/M2 | OXYGEN SATURATION: 95 % | SYSTOLIC BLOOD PRESSURE: 120 MMHG | HEIGHT: 60 IN | DIASTOLIC BLOOD PRESSURE: 64 MMHG

## 2025-04-03 DIAGNOSIS — E78.5 HYPERLIPIDEMIA, UNSPECIFIED: ICD-10-CM

## 2025-04-03 DIAGNOSIS — F41.9 ANXIETY DISORDER, UNSPECIFIED: ICD-10-CM

## 2025-04-03 DIAGNOSIS — Z96.652 PRESENCE OF LEFT ARTIFICIAL KNEE JOINT: ICD-10-CM

## 2025-04-03 DIAGNOSIS — I10 ESSENTIAL (PRIMARY) HYPERTENSION: ICD-10-CM

## 2025-04-03 PROCEDURE — 99214 OFFICE O/P EST MOD 30 MIN: CPT

## 2025-04-03 PROCEDURE — G2211 COMPLEX E/M VISIT ADD ON: CPT

## 2025-04-11 ENCOUNTER — APPOINTMENT (OUTPATIENT)
Dept: SURGERY | Facility: CLINIC | Age: 70
End: 2025-04-11

## 2025-04-11 ENCOUNTER — NON-APPOINTMENT (OUTPATIENT)
Age: 70
End: 2025-04-11

## 2025-04-11 VITALS — BODY MASS INDEX: 41.03 KG/M2 | HEIGHT: 60 IN | WEIGHT: 209 LBS

## 2025-04-11 DIAGNOSIS — E66.01 MORBID (SEVERE) OBESITY DUE TO EXCESS CALORIES: ICD-10-CM

## 2025-04-11 PROCEDURE — 99213 OFFICE O/P EST LOW 20 MIN: CPT | Mod: 2W

## 2025-04-21 ENCOUNTER — NON-APPOINTMENT (OUTPATIENT)
Age: 70
End: 2025-04-21

## 2025-04-28 ENCOUNTER — NON-APPOINTMENT (OUTPATIENT)
Age: 70
End: 2025-04-28

## 2025-04-29 ENCOUNTER — APPOINTMENT (OUTPATIENT)
Dept: ORTHOPEDIC SURGERY | Facility: CLINIC | Age: 70
End: 2025-04-29
Payer: MEDICARE

## 2025-04-29 DIAGNOSIS — Z96.652 PRESENCE OF LEFT ARTIFICIAL KNEE JOINT: ICD-10-CM

## 2025-04-29 PROCEDURE — 99024 POSTOP FOLLOW-UP VISIT: CPT

## 2025-04-29 PROCEDURE — 73562 X-RAY EXAM OF KNEE 3: CPT | Mod: LT

## 2025-05-05 VITALS — WEIGHT: 206 LBS | HEIGHT: 60 IN | BODY MASS INDEX: 40.44 KG/M2

## 2025-06-10 VITALS — HEIGHT: 60 IN | BODY MASS INDEX: 39.27 KG/M2 | WEIGHT: 200 LBS

## 2025-07-01 ENCOUNTER — APPOINTMENT (OUTPATIENT)
Dept: ORTHOPEDIC SURGERY | Facility: CLINIC | Age: 70
End: 2025-07-01
Payer: MEDICARE

## 2025-07-01 PROCEDURE — 99214 OFFICE O/P EST MOD 30 MIN: CPT

## 2025-07-01 PROCEDURE — 73562 X-RAY EXAM OF KNEE 3: CPT | Mod: LT

## 2025-07-15 ENCOUNTER — APPOINTMENT (OUTPATIENT)
Dept: INTERNAL MEDICINE | Facility: CLINIC | Age: 70
End: 2025-07-15
Payer: MEDICARE

## 2025-07-15 VITALS
SYSTOLIC BLOOD PRESSURE: 112 MMHG | TEMPERATURE: 98.3 F | OXYGEN SATURATION: 98 % | HEART RATE: 86 BPM | DIASTOLIC BLOOD PRESSURE: 68 MMHG | WEIGHT: 200 LBS | BODY MASS INDEX: 39.27 KG/M2 | HEIGHT: 60 IN | RESPIRATION RATE: 16 BRPM

## 2025-07-15 PROCEDURE — G0439: CPT

## 2025-07-16 ENCOUNTER — APPOINTMENT (OUTPATIENT)
Dept: SURGERY | Facility: CLINIC | Age: 70
End: 2025-07-16
Payer: MEDICARE

## 2025-07-16 VITALS — BODY MASS INDEX: 38.48 KG/M2 | HEIGHT: 60 IN | WEIGHT: 196 LBS

## 2025-07-16 PROCEDURE — 99213 OFFICE O/P EST LOW 20 MIN: CPT | Mod: 2W

## 2025-07-22 LAB
25(OH)D3 SERPL-MCNC: 35.2 NG/ML
ALBUMIN SERPL ELPH-MCNC: 4.8 G/DL
ALP BLD-CCNC: 83 U/L
ALT SERPL-CCNC: 10 U/L
ANION GAP SERPL CALC-SCNC: 13 MMOL/L
APPEARANCE: CLEAR
AST SERPL-CCNC: 18 U/L
BACTERIA: NEGATIVE /HPF
BASOPHILS # BLD AUTO: 0.03 K/UL
BASOPHILS NFR BLD AUTO: 0.4 %
BILIRUB SERPL-MCNC: 1.4 MG/DL
BILIRUBIN URINE: NEGATIVE
BLOOD URINE: NEGATIVE
BUN SERPL-MCNC: 19 MG/DL
CALCIUM OXALATE CRYSTALS: PRESENT
CALCIUM SERPL-MCNC: 10.5 MG/DL
CAST: 0 /LPF
CHLORIDE SERPL-SCNC: 105 MMOL/L
CHOLEST SERPL-MCNC: 108 MG/DL
CO2 SERPL-SCNC: 22 MMOL/L
COLOR: NORMAL
CREAT SERPL-MCNC: 0.58 MG/DL
EGFRCR SERPLBLD CKD-EPI 2021: 96 ML/MIN/1.73M2
EOSINOPHIL # BLD AUTO: 0.09 K/UL
EOSINOPHIL NFR BLD AUTO: 1.3 %
EPITHELIAL CELLS: 2 /HPF
ESTIMATED AVERAGE GLUCOSE: 105 MG/DL
FOLATE SERPL-MCNC: 3.1 NG/ML
GLUCOSE QUALITATIVE U: NEGATIVE MG/DL
GLUCOSE SERPL-MCNC: 101 MG/DL
HBA1C MFR BLD HPLC: 5.3 %
HCT VFR BLD CALC: 40.1 %
HDLC SERPL-MCNC: 34 MG/DL
HGB BLD-MCNC: 12.8 G/DL
IMM GRANULOCYTES NFR BLD AUTO: 0.1 %
KETONES URINE: NEGATIVE MG/DL
LDLC SERPL-MCNC: 54 MG/DL
LEUKOCYTE ESTERASE URINE: NEGATIVE
LYMPHOCYTES # BLD AUTO: 1.34 K/UL
LYMPHOCYTES NFR BLD AUTO: 19.8 %
MAN DIFF?: NORMAL
MCHC RBC-ENTMCNC: 28.6 PG
MCHC RBC-ENTMCNC: 31.9 G/DL
MCV RBC AUTO: 89.7 FL
MICROSCOPIC-UA: NORMAL
MONOCYTES # BLD AUTO: 0.53 K/UL
MONOCYTES NFR BLD AUTO: 7.8 %
NEUTROPHILS # BLD AUTO: 4.77 K/UL
NEUTROPHILS NFR BLD AUTO: 70.6 %
NITRITE URINE: NEGATIVE
NONHDLC SERPL-MCNC: 74 MG/DL
PH URINE: 5.5
PLATELET # BLD AUTO: 231 K/UL
POTASSIUM SERPL-SCNC: 4.5 MMOL/L
PROT SERPL-MCNC: 7.1 G/DL
PROTEIN URINE: NEGATIVE MG/DL
RBC # BLD: 4.47 M/UL
RBC # FLD: 14.3 %
RED BLOOD CELLS URINE: 3 /HPF
REVIEW: NORMAL
SODIUM SERPL-SCNC: 140 MMOL/L
SPECIFIC GRAVITY URINE: 1.02
TRIGL SERPL-MCNC: 110 MG/DL
TSH SERPL-ACNC: 1.73 UIU/ML
UROBILINOGEN URINE: 0.2 MG/DL
VIT B12 SERPL-MCNC: 349 PG/ML
WBC # FLD AUTO: 6.77 K/UL
WHITE BLOOD CELLS URINE: 1 /HPF

## (undated) DEVICE — SYM-STRYKER TPS CONSOLE: Type: DURABLE MEDICAL EQUIPMENT

## (undated) DEVICE — SOL IRR BAG NS 0.9% 1000ML

## (undated) DEVICE — SYR LUER LOK 50CC

## (undated) DEVICE — POSITIONER STIRRUP STRAP W SLIP RING 19X3.5"

## (undated) DEVICE — PREP CHLORAPREP HI-LITE ORANGE 26ML

## (undated) DEVICE — DRSG PICO NPWT 4X12"

## (undated) DEVICE — POSITIONER STRAP ARMBOARD VELCRO TS-30

## (undated) DEVICE — Device

## (undated) DEVICE — VENODYNE/SCD SLEEVE CALF MEDIUM

## (undated) DEVICE — SPECIMEN CONTAINER 4OZ

## (undated) DEVICE — TOURNIQUET CUFF 34" DUAL PORT W PLC

## (undated) DEVICE — CRYO/CUFF GRAVITY COOLER KNEE LARGE

## (undated) DEVICE — WOUND IRR IRRISEPT W 0.5 CHG

## (undated) DEVICE — SOL IRR BAG NS 0.9% 3000ML

## (undated) DEVICE — SOLIDIFIER CANN EXPRESS 3K

## (undated) DEVICE — PACK TOTAL KNEE

## (undated) DEVICE — SOL IRR POUR NS 0.9% 500ML

## (undated) DEVICE — NDL SPINAL 18G X 3.5" (PINK)

## (undated) DEVICE — ELCTR AQUAMANTYS BIPOLAR SEALER 6.0

## (undated) DEVICE — SPECIMEN CONTAINER PET

## (undated) DEVICE — GLV 8 PROTEXIS (BLUE)

## (undated) DEVICE — DRILL BIT MICROAIRE TWIST 2.4MM X 127MM

## (undated) DEVICE — WARMING BLANKET UPPER ADULT

## (undated) DEVICE — WOUND IRR SURGIPHOR

## (undated) DEVICE — SYR LUER LOK 10CC

## (undated) DEVICE — HOOD FLYTE STRYKER HELMET SHIELD

## (undated) DEVICE — PACK TOTAL HIP

## (undated) DEVICE — BAG SPONGE COUNTER EZ

## (undated) DEVICE — POSITIONER FOAM HEAD CRADLE (PINK)

## (undated) DEVICE — POSITIONER FOAM ABDUCTION PILLOW MED (PINK)

## (undated) DEVICE — SOL IRR POUR H2O 500ML

## (undated) DEVICE — STRYKER PULSE LAVAGE WITH COAXIAL MULTI-ORIFICE TIP

## (undated) DEVICE — CANISTER SUCTION LID GUARD 3000CC

## (undated) DEVICE — ELCTR ROCKER SWITCH PENCIL BLUE 10FT

## (undated) DEVICE — DRSG DERMABOND PRINEO 60CM

## (undated) DEVICE — SUT POLYSORB 2-0 30" GS-11 UNDYED

## (undated) DEVICE — DRILL BIT BIOMET RINGLOCK QUICK CONNECT 3.2MM X 30MM

## (undated) DEVICE — SUT VICRYL PLUS 1 27" CP UNDYED

## (undated) DEVICE — SOL IRR POUR H2O 250ML

## (undated) DEVICE — SUT ETHIBOND 1 30" OS6

## (undated) DEVICE — DRSG COMBINE 5X9"

## (undated) DEVICE — SUT STRATAFIX SPIRAL MONOCRYL PLUS 3-0 30CM PS-2 UNDYED

## (undated) DEVICE — BETADINE SCRUB BRUSH

## (undated) DEVICE — SUT MONOSOF 3-0 30" C-16

## (undated) DEVICE — HANDPIECE INTERPULSE W MULTI TIP

## (undated) DEVICE — SAW BLADE STRYKER SAGITTAL AGGRESSIVE 25X86.5X1.32MM

## (undated) DEVICE — SOL IRR POUR H2O 1500ML